# Patient Record
Sex: FEMALE | Race: WHITE | NOT HISPANIC OR LATINO | Employment: FULL TIME | URBAN - METROPOLITAN AREA
[De-identification: names, ages, dates, MRNs, and addresses within clinical notes are randomized per-mention and may not be internally consistent; named-entity substitution may affect disease eponyms.]

---

## 2017-01-10 ENCOUNTER — THERAPY VISIT (OUTPATIENT)
Dept: PHYSICAL THERAPY | Facility: CLINIC | Age: 37
End: 2017-01-10
Payer: COMMERCIAL

## 2017-01-10 DIAGNOSIS — M54.50 LUMBAGO: Primary | ICD-10-CM

## 2017-01-10 PROCEDURE — 97110 THERAPEUTIC EXERCISES: CPT | Mod: GP | Performed by: PHYSICAL THERAPIST

## 2017-01-10 PROCEDURE — 97530 THERAPEUTIC ACTIVITIES: CPT | Mod: GP | Performed by: PHYSICAL THERAPIST

## 2017-01-11 NOTE — PROGRESS NOTES
Subjective:    HPI                    Objective:    System    Physical Exam    General     ROS    Assessment/Plan:      DISCHARGE REPORT    Progress reporting period is from IE to 1/11/2017.     SUBJECTIVE  Subjective: Prolonged standing inc leg pain temporarily; tennis just LBP   Current Pain level: 0/10            ;   ,     The objective findings are from DOS 1/11/2017.    OBJECTIVE  Objective: Perf supervised pilates w focus on neutral alignement w spine during positions for skating.  Gradually increasing resilance to standing and activities that previously exacerbated leg pain.  Non- mechanical presentation, with goal going forward to remodal tissue and improve mechanics.  Pt may benefit from direct manual tech/dry needling per chiro discretion      ASSESSMENT/PLAN  Updated problem list and treatment plan: Diagnosis 1:  LBP    STG/LTGs have been met or progress has been made towards goals:  Yes (See Goal flow sheet completed today.)  Assessment of Progress: The patient has met all of their long term goals.  Self Management Plans:  Patient is independent in a home treatment program.  Patient is independent in self management of symptoms.  I have re-evaluated this patient and find that the nature, scope, duration and intensity of the therapy is appropriate for the medical condition of the patient.  Valencia continues to require the following intervention to meet STG and LTG's: PT  We will discharge this patient from PT.    Recommendations:  This patient is ready to be discharged from therapy and continue their home treatment program.  Patient would benefit from the following:  F/u w chiro            Please refer to the daily flowsheet for treatment today, total treatment time and time spent performing 1:1 timed codes.

## 2017-02-01 ENCOUNTER — THERAPY VISIT (OUTPATIENT)
Dept: CHIROPRACTIC MEDICINE | Facility: CLINIC | Age: 37
End: 2017-02-01
Payer: COMMERCIAL

## 2017-02-01 DIAGNOSIS — M99.05 SOMATIC DYSFUNCTION OF PELVIS REGION: Primary | ICD-10-CM

## 2017-02-01 DIAGNOSIS — M25.572 CHRONIC PAIN OF LEFT ANKLE: ICD-10-CM

## 2017-02-01 DIAGNOSIS — M25.552 HIP PAIN, LEFT: ICD-10-CM

## 2017-02-01 DIAGNOSIS — G89.29 CHRONIC PAIN OF LEFT ANKLE: ICD-10-CM

## 2017-02-01 DIAGNOSIS — M99.06 SOMATIC DYSFUNCTION OF LOWER EXTREMITIES: ICD-10-CM

## 2017-02-01 DIAGNOSIS — M79.10 MYALGIA: ICD-10-CM

## 2017-02-01 PROCEDURE — 97110 THERAPEUTIC EXERCISES: CPT | Performed by: CHIROPRACTOR

## 2017-02-01 PROCEDURE — 98940 CHIROPRACT MANJ 1-2 REGIONS: CPT | Mod: AT | Performed by: CHIROPRACTOR

## 2017-02-05 PROBLEM — M99.06 SOMATIC DYSFUNCTION OF LOWER EXTREMITIES: Status: ACTIVE | Noted: 2017-02-05

## 2017-02-05 PROBLEM — M99.05 SOMATIC DYSFUNCTION OF PELVIS REGION: Status: ACTIVE | Noted: 2017-02-05

## 2017-02-06 NOTE — PROGRESS NOTES
[facility]  Apr 8, 2016    Subjective:  Valencia Roy   35 year old   female  Denies any changes in medications   CC: left medial ankle, chronic posterior tibialis dysfunction, referral from Dr. Valdovinos  Visit: 6 (even though has not been treated in a while, this is similar symptoms and I have been in communication with her this whole time helping her progress in her running)  Goal: Be able to run beyond 2 miles, stand for 60 minutes without ache in foot  Location:  Left medial ankle   Comes in today doing great. She is back running and it is going well. She notes she made such great gains that she wants to focus on a couple more treatments to help her continue to increase in her speed and time of running. She notes she has been working with PT for lower back and notes her PT recommended that she gets treatment for tightness in lower back as well. Since working with PT we will focus on pelvic leveling and manual therapy. Notes back is 4/10 on average and ankle is 3/10. Denies any swelling. Notes some days she has no pain and feels great.       Objective:  Inspection:  No SDD  No Scars  Normal Gait    Palpation:  No specific pain  Palpable soreness over L medial ankle, left posterior hip, general lower back  Myofascitis 2/4 noted over left posterior tibialis, L hip ER, L QL, LPS    ROM:  Lumbar flexion   90/90, tightness lower back  Lumbar extension  30/30, mild lower back discomfort   Right Hip IR  45/45  Left Hip IR  34/45  Right Hip ER  53/60  Left  hip ER  58/60  Ankle DF limited on L in talar neutral     MMT:  Left glute med 4/5 with no pain  Right glute med 5/5 with no pain  Left TFL 5/5 with lateral hip discomfort   Right TFL 5/5 with no pain    MET:  Left short leg    Squat:  Shift to left  Foot flare to right  Arm drop on left  B foot collapse     Lunge:  Left knee genu valgum  Leftt knee cross over     NAL:  Restricted SI on left side  Restricted talus L      Assessment:  NAL with  associated myofascitis and weakness  Posterior tibialis dysfunction     Plan:  Patient tolerated treatment well today  Treatment Time: 45 minutes   79603 manipulation 1-2 segments: MET, Hip LAD  63710 manipulation: Manual extremity  68275 Manual therapy: (ART, Graston, Strain Counter Strain, Fascial Manipulation, Cupping) performed over area of L hip ER, L posterior tibialis, L calf  57273 therapeutic exercise (20 minutes):  Self ankle mulligan, short foot lateral band walks, toe yoga, single leg balance on bosu ball, single leg hops on bosu ball, single leg hops and land off bosu ball, walking lunges with weight, single leg ball toss on trampoline, short foot activation exercises, alter G running: to be scheduled in [place]  Today: review of current PT exercises she is doing for back and ankle, made corrections as needed  Strapping: ankle mulligan, hip IR  Taping:  Next visit: 3 week, today focused on fascial manipulation of medial lower leg  Other: shock wave 10/6 over medial ankle (did not do today)  Dry Needling: tibial nerve e-stim, 5 points along posterior tib (tolerated well)  Plan: 4 visits over next 2 months to help her progress with her training      Atif Holliday DC, MEd, ATC

## 2017-02-14 ENCOUNTER — THERAPY VISIT (OUTPATIENT)
Dept: CHIROPRACTIC MEDICINE | Facility: CLINIC | Age: 37
End: 2017-02-14
Payer: COMMERCIAL

## 2017-02-14 DIAGNOSIS — M99.06 SOMATIC DYSFUNCTION OF LOWER EXTREMITIES: ICD-10-CM

## 2017-02-14 DIAGNOSIS — M99.05 SOMATIC DYSFUNCTION OF PELVIS REGION: Primary | ICD-10-CM

## 2017-02-14 DIAGNOSIS — G89.29 CHRONIC PAIN OF LEFT ANKLE: ICD-10-CM

## 2017-02-14 DIAGNOSIS — M79.10 MYALGIA: ICD-10-CM

## 2017-02-14 DIAGNOSIS — M25.552 HIP PAIN, LEFT: ICD-10-CM

## 2017-02-14 DIAGNOSIS — M25.572 CHRONIC PAIN OF LEFT ANKLE: ICD-10-CM

## 2017-02-14 PROCEDURE — 98940 CHIROPRACT MANJ 1-2 REGIONS: CPT | Mod: AT | Performed by: CHIROPRACTOR

## 2017-02-14 PROCEDURE — 97110 THERAPEUTIC EXERCISES: CPT | Performed by: CHIROPRACTOR

## 2017-02-17 NOTE — PROGRESS NOTES
[facility] [facility]  Apr 8, 2016    Subjective:  Valencia Roy   35 year old   female  Denies any changes in medications   CC: left medial ankle, chronic posterior tibialis dysfunction, general lower back  Visit: 7  Goal: Be able to run beyond 2 miles, stand for 60 minutes without ache in foot  Location:  Left medial ankle, lower back pain  Comes in today doing well. She tolerated last sessio well. Was a little sore for one day, but notes that she responded well and thinks it helps. Notes tightness along left lower back. She notes she is still working on active care and denies any new issues. Was pleased with progress. She already ran today.       Objective:  Inspection:  No SDD  No Scars  Normal Gait    Palpation:  No specific pain  Palpable soreness over L medial ankle, left posterior hip, general lower back  Myofascitis 2/4 noted over left posterior tibialis, L hip ER, L QL, LPS    ROM:  Lumbar flexion   90/90, tightness lower back  Lumbar extension  30/30, mild lower back discomfort   Right Hip IR  45/45  Left Hip IR  34/45  Right Hip ER  53/60  Left  hip ER  58/60  Ankle DF limited on L in talar neutral     MMT:  Left glute med 4/5 with no pain  Right glute med 5/5 with no pain  Left TFL 5/5 with lateral hip discomfort   Right TFL 5/5 with no pain    MET:  Left short leg    Squat:  Shift to left  Foot flare to right  Arm drop on left  B foot collapse     Lunge:  Left knee genu valgum  Leftt knee cross over     NAL:  Restricted SI on left side  Restricted talus L      Assessment:  NAL with associated myofascitis and weakness  Posterior tibialis dysfunction     Plan:  Patient tolerated treatment well today  Treatment Time: 45 minutes   86578 manipulation 1-2 segments: MET, Hip LAD  31188 manipulation: Manual extremity  85496 Manual therapy: (ART, Graston, Strain Counter Strain, Fascial Manipulation, Cupping) performed over area of L hip ER, L posterior tibialis, L calf  66092 therapeutic exercise  (20 minutes):  Self ankle mulligan, short foot lateral band walks, toe yoga, single leg balance on bosu ball, single leg hops on bosu ball, single leg hops and land off bosu ball, walking lunges with weight, single leg ball toss on trampoline, short foot activation exercises, alter G running: to be scheduled in [place]  Today: review of current PT exercises she is doing for back and ankle, made corrections as needed  Strapping: ankle mulligan, hip IR  Taping:  Next visit: 3 week, today focused on fascial manipulation of medial lower leg  Dry Needling: tibial nerve e-stim, 5 points along posterior tib (tolerated well)  Plan: 4 visits over next 2 months to help her progress with her training      Atif Holliday DC, MEd, ATC

## 2017-02-28 ENCOUNTER — THERAPY VISIT (OUTPATIENT)
Dept: CHIROPRACTIC MEDICINE | Facility: CLINIC | Age: 37
End: 2017-02-28
Payer: COMMERCIAL

## 2017-02-28 DIAGNOSIS — M79.10 MYALGIA: ICD-10-CM

## 2017-02-28 DIAGNOSIS — G89.29 CHRONIC PAIN OF LEFT ANKLE: ICD-10-CM

## 2017-02-28 DIAGNOSIS — M99.06 SOMATIC DYSFUNCTION OF LOWER EXTREMITIES: ICD-10-CM

## 2017-02-28 DIAGNOSIS — M25.572 CHRONIC PAIN OF LEFT ANKLE: ICD-10-CM

## 2017-02-28 DIAGNOSIS — M25.552 HIP PAIN, LEFT: ICD-10-CM

## 2017-02-28 DIAGNOSIS — M99.05 SOMATIC DYSFUNCTION OF PELVIS REGION: ICD-10-CM

## 2017-02-28 PROCEDURE — 97110 THERAPEUTIC EXERCISES: CPT | Performed by: CHIROPRACTOR

## 2017-02-28 PROCEDURE — 98940 CHIROPRACT MANJ 1-2 REGIONS: CPT | Mod: AT | Performed by: CHIROPRACTOR

## 2017-02-28 NOTE — MR AVS SNAPSHOT
"              After Visit Summary   2/28/2017    Valencia Roy    MRN: [id number]           Patient Information     Date Of Birth          1980        Visit Information        Provider Department      2/28/2017 6:45 PM Atif oHlliday DC [facility] Chiropractor        Today's Diagnoses     Somatic dysfunction of pelvis region        Somatic dysfunction of lower extremities        Chronic pain of left ankle        Hip pain, left        Myalgia           Follow-ups after your visit        Who to contact     If you have questions or need follow up information about today's clinic visit or your schedule please contact [facility] ATHLETIC [facility] CHIROPRACTOR directly at [phone].  Normal or non-critical lab and imaging results will be communicated to you by flaregameshart, letter or phone within 4 business days after the clinic has received the results. If you do not hear from us within 7 days, please contact the clinic through flaregameshart or phone. If you have a critical or abnormal lab result, we will notify you by phone as soon as possible.  Submit refill requests through Morningstar or call your pharmacy and they will forward the refill request to us. Please allow 3 business days for your refill to be completed.          Additional Information About Your Visit        MyChart Information     Morningstar lets you send messages to your doctor, view your test results, renew your prescriptions, schedule appointments and more. To sign up, go to [email or web address] . Click on \"Log in\" on the left side of the screen, which will take you to the Welcome page. Then click on \"Sign up Now\" on the right side of the page.     You will be asked to enter the access code listed below, as well as some personal information. Please follow the directions to create your username and password.     Your access code is: [email or web address]  Expires: 5/18/2017  1:52 PM     Your access code will "  in 90 days. If you need help or a new code, please call your [facility] clinic or [phone].        Care EveryWhere ID     This is your Care EveryWhere ID. This could be used by other organizations to access your [facility] medical records  [id number]         Blood Pressure from Last 3 Encounters:   12/15/15 129/72   09/22/15 124/74    Weight from Last 3 Encounters:   12/15/15 62.6 kg (138 lb)   09/22/15 63 kg (139 lb)              We Performed the Following     CHIROPRAC MANIP,SPINAL,1-2 REGIONS     THERAPEUTIC EXERCISES        Primary Care Provider    Pcp Unknown Verified       No address on file        Thank you!     Thank you for choosing [place] [facility]  [facility]  for your care. Our goal is always to provide you with excellent care. Hearing back from our patients is one way we can continue to improve our services. Please take a few minutes to complete the written survey that you may receive in the mail after your visit with us. Thank you!             Your Updated Medication List - Protect others around you: Learn how to safely use, store and throw away your medicines at www.disposemymeds.org.          This list is accurate as of: 17  9:50 PM.  Always use your most recent med list.                   Brand Name Dispense Instructions for use    CALCIUM/MAGNESIUM/ZINC PO      Vitamin D Metabolic Maintenance       DAILY HERBS RELAX/EASE TENSION PO          DIGESTIVE ENZYME PO          EPA PLUS PO          MELATONIN PO          NUTRITIONAL SUPPLEMENT PO      Liver GI Detox       UNABLE TO FIND      EstroDim

## 2017-03-01 NOTE — PROGRESS NOTES
[facility] [facility]  Apr 8, 2016  I ran 6.5 today and felt my foot a little 4-5 and then a little more 5-6 but overall not bad.    The AlterG on Wed - went 80 minutes without pain, but then it started hurting and I took off more weight...didn't hurt much but didn't totally go away either. When I got off that area felt warm compared to the other side. But the previous two times on the AlterG, my foot started to hurt a little at about 60 and then eventually if I took enough weight off, the pain went away. So seems a little better.    Hope you're having a good weekend and see you Tuesday!  Subjective:  Valencia Roy   35 year old   female  Denies any changes in medications   CC: left medial ankle, chronic posterior tibialis dysfunction, general lower back L>R  Visit: 9  Goal: Be able to run beyond 2 miles, stand for 60 minutes without ache in foot  Location:  Left medial ankle, lower back pain L>R  Comes in today doing well. She is continuing to progress in her running and she is very pleased with that. She notes continued progress but still has some bad days. Notes that she is still running on alter G (see email above). She notes she is working on lower back active care but says has had increase in left lower back tightness. She denies any new issue.       Objective:  Inspection:  No SDD  No Scars  Normal Gait    Palpation:  No specific pain  Palpable soreness over L medial ankle, left posterior hip, general lower back, L QL  Myofascitis 2/4 noted over left posterior tibialis, L hip ER, L QL, LPS    ROM:  Lumbar flexion   90/90, tightness lower back  Lumbar extension  30/30, mild lower back discomfort   Right Hip IR  45/45  Left Hip IR  34/45  Right Hip ER  53/60  Left  hip ER  58/60  Ankle DF limited on L in talar neutral     MMT:  Left glute med 4/5 with no pain  Right glute med 5/5 with no pain  Left TFL 5/5 with lateral hip discomfort   Right TFL 5/5 with no pain    MET:  Left short  leg    Squat:  Shift to left  Foot flare to right  Arm drop on left  B foot collapse     Lunge:  Left knee genu valgum  Leftt knee cross over     NAL:  Restricted SI on left side  Restricted talus L      Assessment:  NAL with associated myofascitis and weakness  Posterior tibialis dysfunction     Plan:  Patient tolerated treatment well today  Treatment Time: 45 minutes   52694 manipulation 1-2 segments: MET, Hip LAD  97253 manipulation: Manual extremity  36400 Manual therapy: (ART, Graston, Strain Counter Strain, Fascial Manipulation, Cupping) performed over area of L hip ER, L posterior tibialis, L calf  91323 therapeutic exercise (20 minutes):  Self ankle mulligan, short foot lateral band walks, toe yoga, single leg balance on bosu ball, single leg hops on bosu ball, single leg hops and land off bosu ball, walking lunges with weight, single leg ball toss on trampoline, short foot activation exercises, alter G running: to be scheduled in [place], review of current PT exercises she is doing for back and ankle, made corrections as needed  Today: standing hip hikes, bird dogs on stability ball, Mgill sit ups  Strapping: ankle mulligan, hip IR  Taping:  Next visit: 3 week, today focused on fascial manipulation of medial lower leg  Dry Needling: tibial nerve e-stim, 5 points along posterior tib (tolerated well)  Shock wave: 10/5, medial ankle, tolerated well.       Atif Holliday DC, MEd, ATC

## 2017-03-14 ENCOUNTER — THERAPY VISIT (OUTPATIENT)
Dept: CHIROPRACTIC MEDICINE | Facility: CLINIC | Age: 37
End: 2017-03-14
Payer: COMMERCIAL

## 2017-03-14 DIAGNOSIS — G89.29 CHRONIC PAIN OF LEFT ANKLE: ICD-10-CM

## 2017-03-14 DIAGNOSIS — M99.06 SOMATIC DYSFUNCTION OF LOWER EXTREMITIES: ICD-10-CM

## 2017-03-14 DIAGNOSIS — M99.05 SOMATIC DYSFUNCTION OF PELVIS REGION: ICD-10-CM

## 2017-03-14 DIAGNOSIS — M25.552 HIP PAIN, LEFT: ICD-10-CM

## 2017-03-14 DIAGNOSIS — M79.10 MYALGIA: ICD-10-CM

## 2017-03-14 DIAGNOSIS — M25.572 CHRONIC PAIN OF LEFT ANKLE: ICD-10-CM

## 2017-03-14 PROCEDURE — 98940 CHIROPRACT MANJ 1-2 REGIONS: CPT | Mod: AT | Performed by: CHIROPRACTOR

## 2017-03-14 PROCEDURE — 97110 THERAPEUTIC EXERCISES: CPT | Performed by: CHIROPRACTOR

## 2017-03-14 NOTE — MR AVS SNAPSHOT
"              After Visit Summary   3/14/2017    Valencia Roy    MRN: [id number]           Patient Information     Date Of Birth          1980        Visit Information        Provider Department      3/14/2017 5:45 PM Atif Holliday DC [facility] Chiropractor        Today's Diagnoses     Somatic dysfunction of pelvis region        Somatic dysfunction of lower extremities        Chronic pain of left ankle        Hip pain, left        Myalgia           Follow-ups after your visit        Who to contact     If you have questions or need follow up information about today's clinic visit or your schedule please contact [facility] ATHLETIC [facility] CHIROPRACTOR directly at [phone].  Normal or non-critical lab and imaging results will be communicated to you by Wickrhart, letter or phone within 4 business days after the clinic has received the results. If you do not hear from us within 7 days, please contact the clinic through Wickrhart or phone. If you have a critical or abnormal lab result, we will notify you by phone as soon as possible.  Submit refill requests through MemBlaze or call your pharmacy and they will forward the refill request to us. Please allow 3 business days for your refill to be completed.          Additional Information About Your Visit        MyChart Information     MemBlaze lets you send messages to your doctor, view your test results, renew your prescriptions, schedule appointments and more. To sign up, go to [email or web address] . Click on \"Log in\" on the left side of the screen, which will take you to the Welcome page. Then click on \"Sign up Now\" on the right side of the page.     You will be asked to enter the access code listed below, as well as some personal information. Please follow the directions to create your username and password.     Your access code is: [email or web address]  Expires: 5/18/2017  2:52 PM     Your access code will "  in 90 days. If you need help or a new code, please call your [facility] clinic or [phone].        Care EveryWhere ID     This is your Care EveryWhere ID. This could be used by other organizations to access your [facility] medical records  [id number]         Blood Pressure from Last 3 Encounters:   12/15/15 129/72   09/22/15 124/74    Weight from Last 3 Encounters:   12/15/15 62.6 kg (138 lb)   09/22/15 63 kg (139 lb)              We Performed the Following     CHIROPRAC MANIP,SPINAL,1-2 REGIONS     THERAPEUTIC EXERCISES        Primary Care Provider    Pcp Unknown Verified       No address on file        Thank you!     Thank you for choosing [facility]  [facility]  for your care. Our goal is always to provide you with excellent care. Hearing back from our patients is one way we can continue to improve our services. Please take a few minutes to complete the written survey that you may receive in the mail after your visit with us. Thank you!             Your Updated Medication List - Protect others around you: Learn how to safely use, store and throw away your medicines at www.disposemymeds.org.          This list is accurate as of: 3/14/17  7:18 PM.  Always use your most recent med list.                   Brand Name Dispense Instructions for use    CALCIUM/MAGNESIUM/ZINC PO      Vitamin D Metabolic Maintenance       DAILY HERBS RELAX/EASE TENSION PO          DIGESTIVE ENZYME PO          EPA PLUS PO          MELATONIN PO          NUTRITIONAL SUPPLEMENT PO      Liver GI Detox       UNABLE TO FIND      EstroDim

## 2017-03-15 NOTE — PROGRESS NOTES
[place] [facility]  Apr 8, 2016    Subjective:  Valencia Roy   35 year old   female  Denies any changes in medications   CC: left medial ankle, chronic posterior tibialis dysfunction, general lower back L>R  Visit: 10  Goal: Be able to run beyond 2 miles, stand for 60 minutes without ache in foot  Location:  Left medial ankle, lower back pain L>R  Comes in today doing well. She is continuing to progress in her running and she is very pleased with that. She was able to do 110 minutes on Alter G pain free at 80% and was very pleased. Still running outside and progressing. Notes ankle felt very good after last session. Notes back was very tight after Alter G. Saw her PT ([facility]) and they did some stretching and it helped. Notes left lower back pain. Denies any new issues and very pleased with how she has felt since last visit.        Objective:  Inspection:  No SDD  No Scars  Normal Gait    Palpation:  No specific pain  Palpable soreness over L medial ankle, left posterior hip, general lower back, L QL  Myofascitis 2/4 noted over left posterior tibialis, L hip ER, L QL, LPS    ROM:  Lumbar flexion   90/90, tightness lower back  Lumbar extension  30/30, mild lower back discomfort   Right Hip IR  45/45  Left Hip IR  34/45  Right Hip ER  53/60  Left  hip ER  58/60  Ankle DF limited on L in talar neutral     MMT:  Left glute med 4/5 with no pain  Right glute med 5/5 with no pain  Left TFL 5/5 with lateral hip discomfort   Right TFL 5/5 with no pain    MET:  Left short leg    Squat:  Shift to left  Foot flare to right  Arm drop on left  B foot collapse     Lunge:  Left knee genu valgum  Leftt knee cross over     NAL:  Restricted SI on left side  Restricted talus L      Assessment:  NAL with associated myofascitis and weakness  Posterior tibialis dysfunction     Plan:  Patient tolerated treatment well today  Treatment Time: 45 minutes   90557 manipulation 1-2 segments: MET, Hip LAD  00315 manipulation:  Manual extremity  70083 Manual therapy: (ART, Graston, Strain Counter Strain, Fascial Manipulation, Cupping) performed over area of L hip ER, L posterior tibialis, L calf  17658 therapeutic exercise (20 minutes):  Self ankle mulligan, short foot lateral band walks, toe yoga, single leg balance on bosu ball, single leg hops on bosu ball, single leg hops and land off bosu ball, walking lunges with weight, single leg ball toss on trampoline, short foot activation exercises, alter G running: to be scheduled in [place], review of current PT exercises she is doing for back and ankle, made corrections as needed  Today: standing hip hikes, bird dogs on stability ball, Mgill sit ups  Strapping: ankle mulligan, hip IR  Taping:  Next visit: 3 week, today focused on fascial manipulation of medial lower leg, left posterior hip, L QL  Dry Needlin points along posterior tib (tolerated well)  Shock wave: 15/5, medial ankle, tolerated well.       Atif Holliday DC, MEd, ATC

## 2017-03-31 ENCOUNTER — THERAPY VISIT (OUTPATIENT)
Dept: CHIROPRACTIC MEDICINE | Facility: CLINIC | Age: 37
End: 2017-03-31
Payer: COMMERCIAL

## 2017-03-31 DIAGNOSIS — G89.29 CHRONIC PAIN OF LEFT ANKLE: ICD-10-CM

## 2017-03-31 DIAGNOSIS — M25.572 CHRONIC PAIN OF LEFT ANKLE: ICD-10-CM

## 2017-03-31 DIAGNOSIS — M99.06 SOMATIC DYSFUNCTION OF LOWER EXTREMITIES: ICD-10-CM

## 2017-03-31 DIAGNOSIS — M99.05 SOMATIC DYSFUNCTION OF PELVIS REGION: Primary | ICD-10-CM

## 2017-03-31 DIAGNOSIS — M25.552 HIP PAIN, LEFT: ICD-10-CM

## 2017-03-31 DIAGNOSIS — M79.10 MYALGIA: ICD-10-CM

## 2017-03-31 PROCEDURE — 97110 THERAPEUTIC EXERCISES: CPT | Performed by: CHIROPRACTOR

## 2017-03-31 PROCEDURE — 98940 CHIROPRACT MANJ 1-2 REGIONS: CPT | Mod: AT | Performed by: CHIROPRACTOR

## 2017-03-31 NOTE — MR AVS SNAPSHOT
"              After Visit Summary   3/31/2017    Valencia Roy    MRN: [id number]           Patient Information     Date Of Birth          1980        Visit Information        Provider Department      3/31/2017 5:45 PM Atif Holliday DC [facility] Chiropractor        Today's Diagnoses     Somatic dysfunction of pelvis region    -  1    Somatic dysfunction of lower extremities        Chronic pain of left ankle        Hip pain, left        Myalgia           Follow-ups after your visit        Who to contact     If you have questions or need follow up information about today's clinic visit or your schedule please contact [facility] ATHLETIC [facility] CHIROPRACTOR directly at [phone].  Normal or non-critical lab and imaging results will be communicated to you by Thoundshart, letter or phone within 4 business days after the clinic has received the results. If you do not hear from us within 7 days, please contact the clinic through Thoundshart or phone. If you have a critical or abnormal lab result, we will notify you by phone as soon as possible.  Submit refill requests through Made[employer] or call your pharmacy and they will forward the refill request to us. Please allow 3 business days for your refill to be completed.          Additional Information About Your Visit        MyChart Information     [employer] lets you send messages to your doctor, view your test results, renew your prescriptions, schedule appointments and more. To sign up, go to [email or web address] . Click on \"Log in\" on the left side of the screen, which will take you to the Welcome page. Then click on \"Sign up Now\" on the right side of the page.     You will be asked to enter the access code listed below, as well as some personal information. Please follow the directions to create your username and password.     Your access code is: [email or web address]  Expires: 5/18/2017  2:52 PM     Your access code " will  in 90 days. If you need help or a new code, please call your [place] clinic or [phone].        Care EveryWhere ID     This is your Care EveryWhere ID. This could be used by other organizations to access your [place] medical records  [id number]         Blood Pressure from Last 3 Encounters:   12/15/15 129/72   09/22/15 124/74    Weight from Last 3 Encounters:   12/15/15 62.6 kg (138 lb)   09/22/15 63 kg (139 lb)              We Performed the Following     CHIROPRAC MANIP,SPINAL,1-2 REGIONS     THERAPEUTIC EXERCISES        Primary Care Provider    Pcp Unknown Verified       No address on file        Thank you!     Thank you for choosing [place] [facility]  [facility]  [facility] your care. Our goal is always to provide you with excellent care. Hearing back from our patients is one way we can continue to improve our services. Please take a few minutes to complete the written survey that you may receive in the mail after your visit with us. Thank you!             Your Updated Medication List - Protect others around you: Learn how to safely use, store and throw away your medicines at www.disposemymeds.org.          This list is accurate as of: 3/31/17  8:26 PM.  Always use your most recent med list.                   Brand Name Dispense Instructions for use    CALCIUM/MAGNESIUM/ZINC PO      Vitamin D Metabolic Maintenance       DAILY HERBS RELAX/EASE TENSION PO          DIGESTIVE ENZYME PO          EPA PLUS PO          MELATONIN PO          NUTRITIONAL SUPPLEMENT PO      Liver GI Detox       UNABLE TO FIND      EstroDim

## 2017-04-01 NOTE — PROGRESS NOTES
[facility] [facility]  Apr 8, 2016    Subjective:  Valencia Roy   35 year old   female  Denies any changes in medications   CC: left medial ankle, chronic posterior tibialis dysfunction, general lower back L>R  Visit: 11  Goal: Be able to run beyond 2 miles, stand for 60 minutes without ache in foot  Location:  Left medial ankle, lower back pain L>R  Comes in today doing great. Progressing well with long run on [employer] and running outside. Notes both ankle and back is better. She is seeing DO for her neck and upper back. Notes it is going well. States that pain in ankle is 3/10 during running and not specific of when it comes on. Notes lower back is doing well and notes most tightness over lateral hip and lower back instead of mid lower back.       Objective:  Inspection:  No SDD  No Scars  Normal Gait    Palpation:  No specific pain  Palpable soreness over L medial ankle, left posterior hip, general lower back, L QL  Myofascitis 2/4 noted over left posterior tibialis, L hip ER, L QL, LPS    ROM:  Lumbar flexion   90/90, tightness lower back  Lumbar extension  30/30, mild lower back discomfort   Right Hip IR  45/45  Left Hip IR  34/45  Right Hip ER  53/60  Left  hip ER  58/60  Ankle DF limited on L in talar neutral     MMT:  Left glute med 4/5 with no pain  Right glute med 5/5 with no pain  Left TFL 5/5 with lateral hip discomfort   Right TFL 5/5 with no pain    MET:  Left short leg    Squat:  Shift to left  Foot flare to right  Arm drop on left  B foot collapse     Lunge:  Left knee genu valgum  Leftt knee cross over     NAL:  Restricted SI on left side  Restricted talus L      Assessment:  NAL with associated myofascitis and weakness  Posterior tibialis dysfunction     Plan:  Patient tolerated treatment well today  Treatment Time: 45 minutes   41298 manipulation 1-2 segments: MET, Hip LAD  42234 manipulation: Manual extremity  67925 Manual therapy: (ART, Graston, Strain Counter Strain, Fascial  Manipulation, Cupping) performed over area of L hip ER, L posterior tibialis, L calf  07865 therapeutic exercise (20 minutes):  Self ankle mulligan, short foot lateral band walks, toe yoga, single leg balance on bosu ball, single leg hops on bosu ball, single leg hops and land off bosu ball, walking lunges with weight, single leg ball toss on trampoline, short foot activation exercises, alter G running: to be scheduled in [place], review of current PT exercises she is doing for back and ankle, made corrections as needed  Today: standing hip hikes, bird dogs on stability ball, Mgill sit ups  Strapping: ankle mulligan, hip IR  Taping:  Next visit: 3 week, today focused on fascial manipulation of medial lower leg, left posterior hip, L QL  Dry Needlin points along posterior tib (tolerated well)  Shock wave: 15/6, medial ankle, tolerated well.       Atif Holliday DC, MEd, ATC

## 2017-04-20 ENCOUNTER — THERAPY VISIT (OUTPATIENT)
Dept: CHIROPRACTIC MEDICINE | Facility: CLINIC | Age: 37
End: 2017-04-20
Payer: COMMERCIAL

## 2017-04-20 DIAGNOSIS — G89.29 CHRONIC PAIN OF LEFT ANKLE: ICD-10-CM

## 2017-04-20 DIAGNOSIS — M99.06 SOMATIC DYSFUNCTION OF LOWER EXTREMITIES: ICD-10-CM

## 2017-04-20 DIAGNOSIS — M79.10 MYALGIA: ICD-10-CM

## 2017-04-20 DIAGNOSIS — M25.552 HIP PAIN, LEFT: ICD-10-CM

## 2017-04-20 DIAGNOSIS — M25.572 CHRONIC PAIN OF LEFT ANKLE: ICD-10-CM

## 2017-04-20 DIAGNOSIS — M99.05 SOMATIC DYSFUNCTION OF PELVIS REGION: ICD-10-CM

## 2017-04-20 PROCEDURE — 98940 CHIROPRACT MANJ 1-2 REGIONS: CPT | Mod: AT | Performed by: CHIROPRACTOR

## 2017-04-20 PROCEDURE — 97110 THERAPEUTIC EXERCISES: CPT | Performed by: CHIROPRACTOR

## 2017-04-20 NOTE — MR AVS SNAPSHOT
"              After Visit Summary   4/20/2017    Valencia Roy    MRN: [id number]           Patient Information     Date Of Birth          1980        Visit Information        Provider Department      4/20/2017 4:15 PM Atif Holliday DC [facility]        Today's Diagnoses     Somatic dysfunction of pelvis region        Somatic dysfunction of lower extremities        Chronic pain of left ankle        Hip pain, left        Myalgia           Follow-ups after your visit        Who to contact     If you have questions or need follow up information about today's clinic visit or your schedule please contact [facility] ATHLETIC [facility] CHIROPRACTOR directly at [phone].  Normal or non-critical lab and imaging results will be communicated to you by FP Completehart, letter or phone within 4 business days after the clinic has received the results. If you do not hear from us within 7 days, please contact the clinic through FP Completehart or phone. If you have a critical or abnormal lab result, we will notify you by phone as soon as possible.  Submit refill requests through [employer] or call your pharmacy and they will forward the refill request to us. Please allow 3 business days for your refill to be completed.          Additional Information About Your Visit        MyChart Information     [employer] lets you send messages to your doctor, view your test results, renew your prescriptions, schedule appointments and more. To sign up, go to [email or web address] . Click on \"Log in\" on the left side of the screen, which will take you to the Welcome page. Then click on \"Sign up Now\" on the right side of the page.     You will be asked to enter the access code listed below, as well as some personal information. Please follow the directions to create your username and password.     Your access code is: [email or web address]  Expires: 5/18/2017  2:52 PM     Your access code will "  in 90 days. If you need help or a new code, please call your [place] clinic or [phone].        Care EveryWhere ID     This is your Care EveryWhere ID. This could be used by other organizations to access your [place] medical records  [id number]         Blood Pressure from Last 3 Encounters:   12/15/15 129/72   09/22/15 124/74    Weight from Last 3 Encounters:   12/15/15 62.6 kg (138 lb)   09/22/15 63 kg (139 lb)              We Performed the Following     CHIROPRAC MANIP,SPINAL,1-2 REGIONS     THERAPEUTIC EXERCISES        Primary Care Provider    Pcp Unknown Verified       No address on file        Thank you!     Thank you for choosing [facility]  [facility]  for your care. Our goal is always to provide you with excellent care. Hearing back from our patients is one way we can continue to improve our services. Please take a few minutes to complete the written survey that you may receive in the mail after your visit with us. Thank you!             Your Updated Medication List - Protect others around you: Learn how to safely use, store and throw away your medicines at www.disposemymeds.org.          This list is accurate as of: 17  8:02 PM.  Always use your most recent med list.                   Brand Name Dispense Instructions for use    CALCIUM/MAGNESIUM/ZINC PO      Vitamin D Metabolic Maintenance       DAILY HERBS RELAX/EASE TENSION PO          DIGESTIVE ENZYME PO          EPA PLUS PO          MELATONIN PO          NUTRITIONAL SUPPLEMENT PO      Liver GI Detox       UNABLE TO FIND      EstroDim

## 2017-04-21 NOTE — PROGRESS NOTES
[place] [facility]  Apr 8, 2016    Subjective:  Valencia Roy   35 year old   female  Denies any changes in medications   CC: left medial ankle, chronic posterior tibialis dysfunction, general lower back L>R  Visit: 12  Goal: Be able to run beyond 2 miles, stand for 60 minutes without ache in foot  Location:  Left medial ankle, lower back pain L>R  Comes in today doing well. She continues to note improvement in ankle. Notes that occasionally has cramping sensation in arch of left foot. Notes that doesn't happen all time. Notes she rolls it and it usually feels good. Notes she started running with [employer] which is helping and she is working on speed. She denies any new issues with ankle. Back is still good but still tight. Notes feels very good after treatment.       Objective:  Inspection:  No SDD  No Scars  Normal Gait    Palpation:  No specific pain  Palpable soreness over L medial ankle, left posterior hip, general lower back, L QL  Myofascitis 2/4 noted over left posterior tibialis, L hip ER, L QL, LPS    ROM:  Lumbar flexion   90/90, tightness lower back  Lumbar extension  30/30, mild lower back discomfort   Right Hip IR  45/45  Left Hip IR  34/45  Right Hip ER  53/60  Left  hip ER  58/60  Ankle DF limited on L in talar neutral     MMT:  Left glute med 4/5 with no pain  Right glute med 5/5 with no pain  Left TFL 5/5 with lateral hip discomfort   Right TFL 5/5 with no pain    MET:  Left short leg    Squat:  Shift to left  Foot flare to right  Arm drop on left  B foot collapse     Lunge:  Left knee genu valgum  Leftt knee cross over     NAL:  Restricted SI on left side  Restricted talus L      Assessment:  NAL with associated myofascitis and weakness  Posterior tibialis dysfunction     Plan:  Patient tolerated treatment well today  Treatment Time: 45 minutes   26466 manipulation 1-2 segments: MET, Hip LAD  42480 manipulation: Manual extremity  29880 Manual therapy: (ART, Graston, Strain Counter  Strain, Fascial Manipulation, Cupping) performed over area of L hip ER, L posterior tibialis, L calf  71129 therapeutic exercise (20 minutes):  Self ankle mulligan, short foot lateral band walks, toe yoga, single leg balance on bosu ball, single leg hops on bosu ball, single leg hops and land off bosu ball, walking lunges with weight, single leg ball toss on trampoline, short foot activation exercises, alter G running: to be scheduled in [place], review of current PT exercises she is doing for back and ankle, made corrections as needed  Today: standing hip hikes, bird dogs on stability ball, Mgill sit ups  Strapping: ankle mulligan, hip IR  Taping:  Next visit: 4 week, today focused on fascial manipulation of medial lower leg, left posterior hip, L QL  Dry Needlin points along posterior tib (did not do today)  Shock wave: 15/6, medial ankle, tolerated well.       Atif Holliday DC, MEd, ATC

## 2017-05-19 ENCOUNTER — THERAPY VISIT (OUTPATIENT)
Dept: CHIROPRACTIC MEDICINE | Facility: CLINIC | Age: 37
End: 2017-05-19
Payer: COMMERCIAL

## 2017-05-19 DIAGNOSIS — M25.552 HIP PAIN, LEFT: ICD-10-CM

## 2017-05-19 DIAGNOSIS — M99.06 SOMATIC DYSFUNCTION OF LOWER EXTREMITIES: ICD-10-CM

## 2017-05-19 DIAGNOSIS — M99.05 SOMATIC DYSFUNCTION OF PELVIS REGION: Primary | ICD-10-CM

## 2017-05-19 DIAGNOSIS — M79.10 MYALGIA: ICD-10-CM

## 2017-05-19 DIAGNOSIS — G89.29 CHRONIC PAIN OF LEFT ANKLE: ICD-10-CM

## 2017-05-19 DIAGNOSIS — M25.572 CHRONIC PAIN OF LEFT ANKLE: ICD-10-CM

## 2017-05-19 PROCEDURE — 97110 THERAPEUTIC EXERCISES: CPT | Performed by: CHIROPRACTOR

## 2017-05-19 PROCEDURE — 98940 CHIROPRACT MANJ 1-2 REGIONS: CPT | Mod: AT | Performed by: CHIROPRACTOR

## 2017-05-19 NOTE — MR AVS SNAPSHOT
"              After Visit Summary   5/19/2017    Valencia Roy    MRN: [id number]           Patient Information     Date Of Birth          1980        Visit Information        Provider Department      5/19/2017 4:45 PM Atif Holliday DC [facility] Chiropractor        Today's Diagnoses     Somatic dysfunction of pelvis region    -  1    Somatic dysfunction of lower extremities        Chronic pain of left ankle        Hip pain, left        Myalgia           Follow-ups after your visit        Who to contact     If you have questions or need follow up information about today's clinic visit or your schedule please contact [facility] ATHLETIC [facility] CHIROPRACTOR directly at [phone].  Normal or non-critical lab and imaging results will be communicated to you by Cyber-Rainhart, letter or phone within 4 business days after the clinic has received the results. If you do not hear from us within 7 days, please contact the clinic through Cyber-Rainhart or phone. If you have a critical or abnormal lab result, we will notify you by phone as soon as possible.  Submit refill requests through EventMama or call your pharmacy and they will forward the refill request to us. Please allow 3 business days for your refill to be completed.          Additional Information About Your Visit        MyChart Information     EventM[employer] lets you send messages to your doctor, view your test results, renew your prescriptions, schedule appointments and more. To sign up, go to [email or web address] . Click on \"Log in\" on the left side of the screen, which will take you to the Welcome page. Then click on \"Sign up Now\" on the right side of the page.     You will be asked to enter the access code listed below, as well as some personal information. Please follow the directions to create your username and password.     Your access code is: [email or web address]  Expires: 8/18/2017  9:00 PM     Your access code " will  in 90 days. If you need help or a new code, please call your [facility] clinic or [phone].        Care EveryWhere ID     This is your Care EveryWhere ID. This could be used by other organizations to access your [facility] medical records  [id number]         Blood Pressure from Last 3 Encounters:   12/15/15 129/72   09/22/15 124/74    Weight from Last 3 Encounters:   12/15/15 62.6 kg (138 lb)   09/22/15 63 kg (139 lb)              We Performed the Following     CHIROPRAC MANIP,SPINAL,1-2 REGIONS     THERAPEUTIC EXERCISES        Primary Care Provider    Pcp Unknown Verified       No address on file        Thank you!     Thank you for choosing [facility]  [facility]  for your care. Our goal is always to provide you with excellent care. Hearing back from our patients is one way we can continue to improve our services. Please take a few minutes to complete the written survey that you may receive in the mail after your visit with us. Thank you!             Your Updated Medication List - Protect others around you: Learn how to safely use, store and throw away your medicines at www.disposemymeds.org.          This list is accurate as of: 17 11:59 PM.  Always use your most recent med list.                   Brand Name Dispense Instructions for use    CALCIUM/MAGNESIUM/ZINC PO      Vitamin D Metabolic Maintenance       DAILY HERBS RELAX/EASE TENSION PO          DIGESTIVE ENZYME PO          EPA PLUS PO          MELATONIN PO          NUTRITIONAL SUPPLEMENT PO      Liver GI Detox       UNABLE TO FIND      EstroDim

## 2017-05-21 NOTE — PROGRESS NOTES
[facility] [facility]  Apr 8, 2016    Subjective:  Valencia Roy   35 year old   female  Denies any changes in medications   CC: left medial ankle, chronic posterior tibialis dysfunction, general lower back L>R  Visit: 13  Goal: Be able to run beyond 2 miles, stand for 60 minutes without ache in foot  Location:  Left medial ankle, lower back pain L>R  Comes in today doing well. Had re-check with Dr. Valdovinos and it went well. Notes that he recommended that she goes to more neutral shoes and continue to wear super feet. Notes that her running is going well. Some days she only has mild tightness. Has some tightness over lateral ankle. Notes she continues to improve and is very pleased with progress. She denies any new issues. Notes her back is doing better as well. Notes tightness over left lower back. Denies any radiating pain.       Objective:  Inspection:  No SDD  No Scars  Normal Gait    Palpation:  No specific pain  Palpable soreness over L medial ankle, left posterior hip, general lower back, L QL  Myofascitis 2/4 noted over left posterior tibialis, L hip ER, L QL, LPS    ROM:  Lumbar flexion   90/90, tightness lower back  Lumbar extension  30/30, mild lower back discomfort   Right Hip IR  45/45  Left Hip IR  34/45  Right Hip ER  53/60  Left  hip ER  58/60  Ankle DF limited on L in talar neutral     MMT:  Left glute med 4/5 with no pain  Right glute med 5/5 with no pain  Left TFL 5/5 with lateral hip discomfort   Right TFL 5/5 with no pain    MET:  Left short leg    Squat:  Shift to left  Foot flare to right  Arm drop on left  B foot collapse     Lunge:  Left knee genu valgum  Leftt knee cross over     NAL:  Restricted SI on left side  Restricted talus L      Assessment:  NAL with associated myofascitis and weakness  Posterior tibialis dysfunction     Plan:  Patient tolerated treatment well today  Treatment Time: 45 minutes   41026 manipulation 1-2 segments: MET, Hip LAD  19641 manipulation: Manual  extremity  17102 Manual therapy: (ART, Graston, Strain Counter Strain, Fascial Manipulation, Cupping) performed over area of L hip ER, L posterior tibialis, L calf  37746 therapeutic exercise (20 minutes):  Self ankle mulligan, short foot lateral band walks, toe yoga, single leg balance on bosu ball, single leg hops on bosu ball, single leg hops and land off bosu ball, walking lunges with weight, single leg ball toss on trampoline, short foot activation exercises, alter G running: to be scheduled in [place], review of current PT exercises she is doing for back and ankle, made corrections as needed  Today: standing hip hikes, bird dogs on stability ball, Mgill sit ups  Strapping: ankle mulligan, hip IR  Taping:  Next visit: 4 week, today focused on fascial manipulation of medial lower leg, left posterior hip, L QL  Dry Needlin points along posterior tib (did not do today)  Shock wave: 15/7, medial ankle, tolerated well.       Atif Holliday DC, MEd, ATC

## 2017-06-08 ENCOUNTER — THERAPY VISIT (OUTPATIENT)
Dept: CHIROPRACTIC MEDICINE | Facility: CLINIC | Age: 37
End: 2017-06-08
Payer: COMMERCIAL

## 2017-06-08 DIAGNOSIS — M79.10 MYALGIA: ICD-10-CM

## 2017-06-08 DIAGNOSIS — M99.06 SOMATIC DYSFUNCTION OF LOWER EXTREMITIES: ICD-10-CM

## 2017-06-08 DIAGNOSIS — M99.05 SOMATIC DYSFUNCTION OF PELVIS REGION: ICD-10-CM

## 2017-06-08 DIAGNOSIS — M25.552 HIP PAIN, LEFT: ICD-10-CM

## 2017-06-08 DIAGNOSIS — G89.29 CHRONIC PAIN OF LEFT ANKLE: ICD-10-CM

## 2017-06-08 DIAGNOSIS — M25.572 CHRONIC PAIN OF LEFT ANKLE: ICD-10-CM

## 2017-06-08 PROCEDURE — 97110 THERAPEUTIC EXERCISES: CPT | Performed by: CHIROPRACTOR

## 2017-06-08 PROCEDURE — 98940 CHIROPRACT MANJ 1-2 REGIONS: CPT | Mod: AT | Performed by: CHIROPRACTOR

## 2017-06-08 NOTE — MR AVS SNAPSHOT
"              After Visit Summary   6/8/2017    Valencia Roy    MRN: [id number]           Patient Information     Date Of Birth          1980        Visit Information        Provider Department      6/8/2017 5:30 PM Atif Holliday DC [facility] Chiropractor        Today's Diagnoses     Somatic dysfunction of pelvis region        Somatic dysfunction of lower extremities        Chronic pain of left ankle        Hip pain, left        Myalgia           Follow-ups after your visit        Who to contact     If you have questions or need follow up information about today's clinic visit or your schedule please contact [facility] ATHLETIC [facility] CHIROPRACTOR directly at [phone].  Normal or non-critical lab and imaging results will be communicated to you by iAcademichart, letter or phone within 4 business days after the clinic has received the results. If you do not hear from us within 7 days, please contact the clinic through iAcademichart or phone. If you have a critical or abnormal lab result, we will notify you by phone as soon as possible.  Submit refill requests through [employer] or call your pharmacy and they will forward the refill request to us. Please allow 3 business days for your refill to be completed.          Additional Information About Your Visit        MyChart Information     [employer] lets you send messages to your doctor, view your test results, renew your prescriptions, schedule appointments and more. To sign up, go to [email or web address] . Click on \"Log in\" on the left side of the screen, which will take you to the Welcome page. Then click on \"Sign up Now\" on the right side of the page.     You will be asked to enter the access code listed below, as well as some personal information. Please follow the directions to create your username and password.     Your access code is: [email or web address]  Expires: 8/18/2017  9:00 PM     Your access code will "  in 90 days. If you need help or a new code, please call your [facility] clinic or [phone].        Care EveryWhere ID     This is your Care EveryWhere ID. This could be used by other organizations to access your [facility] medical records  [id number]         Blood Pressure from Last 3 Encounters:   12/15/15 129/72   09/22/15 124/74    Weight from Last 3 Encounters:   12/15/15 62.6 kg (138 lb)   09/22/15 63 kg (139 lb)              We Performed the Following     CHIROPRAC MANIP,SPINAL,1-2 REGIONS     THERAPEUTIC EXERCISES        Primary Care Provider    Pcp Unknown Verified       No address on file        Thank you!     Thank you for choosing [place] [facility]INE  [facility]  for your care. Our goal is always to provide you with excellent care. Hearing back from our patients is one way we can continue to improve our services. Please take a few minutes to complete the written survey that you may receive in the mail after your visit with us. Thank you!             Your Updated Medication List - Protect others around you: Learn how to safely use, store and throw away your medicines at www.disposemymeds.org.          This list is accurate as of: 17 11:59 PM.  Always use your most recent med list.                   Brand Name Dispense Instructions for use    CALCIUM/MAGNESIUM/ZINC PO      Vitamin D Metabolic Maintenance       DAILY HERBS RELAX/EASE TENSION PO          DIGESTIVE ENZYME PO          EPA PLUS PO          MELATONIN PO          NUTRITIONAL SUPPLEMENT PO      Liver GI Detox       UNABLE TO FIND      EstroDim

## 2017-06-10 NOTE — PROGRESS NOTES
[facility]  Apr 8, 2016    We did a 1 mile time trial tonight at [facility] and I got 6:50 :) I just was going to be mad if I didn't break 7, but now I want to get it down to 6:30...that will be closer to what I did in college anyway :)     Foot was great! (did 2 mile warm up, race warm up, 1 mile time trial, then 2 mile cool down) I am having more tingling in that nerve again today...not sure why...actually, maybe it's a little closer to my shin..I don't know, but it's ok it doesn't hurt anyway.    My right knee is also starting to get sore again...I think it's b/c I've been skating more, but was a little sore at running tonight too. Would you mind checking that again? I know I had you do that probably a year ago and you said everything was fine, but just want to make sure again. (and last year the pain just went away on its own) It's just under my knee cap...usual spot that seems to come and go, but been kind of persistent this past week or two.  Subjective:  Valencia Roy   35 year old   female  Denies any changes in medications   CC: left medial ankle, chronic posterior tibialis dysfunction, general lower back L>R  Visit: 14  Goal: Be able to run beyond 2 miles, stand for 60 minutes without ache in foot  Location:  Left medial ankle, lower back pain L>R  Comes in today doing well. She she sent email above prior to treatment. She is racing 5K this weekend. She notes some cramping sensation in left foot. Notes mild anterior knee pain on right.       Objective:  Inspection:  No SDD  No Scars  Normal Gait    Palpation:  No specific pain  Palpable soreness over L medial ankle, left posterior hip, general lower back, L QL, R knee  Myofascitis 2/4 noted over left posterior tibialis, L hip ER, L QL, LPS, R distal quad    ROM:  Lumbar flexion   90/90, tightness lower back  Lumbar extension  30/30, mild lower back discomfort   Right Hip IR  45/45  Left Hip IR  34/45  Right Hip ER  53/60  Left  hip  ER  58/60  Ankle DF limited on L in talar neutral     MMT:  Left glute med 4/5 with no pain  Right glute med 5/5 with no pain  Left TFL 5/5 with lateral hip discomfort   Right TFL 5/5 with no pain    MET:  Left short leg    Squat:  Shift to left  Foot flare to right  Arm drop on left  B foot collapse     Lunge:  Left knee genu valgum  Leftt knee cross over     NAL:  Restricted SI on left side  Restricted talus L      Assessment:  NAL with associated myofascitis and weakness  Posterior tibialis dysfunction     Plan:  Patient tolerated treatment well today  Treatment Time: 45 minutes   05729 manipulation 1-2 segments: MET, Hip LAD  80891 manipulation: Manual extremity  08831 Manual therapy: (ART, Graston, Strain Counter Strain, Fascial Manipulation, Cupping) performed over area of L hip ER, L posterior tibialis, L calf  04647 therapeutic exercise (20 minutes):  Self ankle mulligan, short foot lateral band walks, toe yoga, single leg balance on bosu ball, single leg hops on bosu ball, single leg hops and land off bosu ball, walking lunges with weight, single leg ball toss on trampoline, short foot activation exercises, alter G running: to be scheduled in [place], review of current PT exercises she is doing for back and ankle, made corrections as needed  Today: standing hip hikes, bird dogs on stability ball, Mgill sit ups, clams shells, side lying hip rotation   Strapping: ankle mulligan, hip IR  Taping:  Next visit: 4 week, today focused on fascial manipulation of medial lower leg, left posterior hip, L QL  Dry Needlin with stim over left medial arch  Shock wave: 15/7, medial ankle, tolerated well.       Atif Holliday DC, MEd, ATC

## 2017-06-29 ENCOUNTER — THERAPY VISIT (OUTPATIENT)
Dept: CHIROPRACTIC MEDICINE | Facility: CLINIC | Age: 37
End: 2017-06-29
Payer: COMMERCIAL

## 2017-06-29 DIAGNOSIS — M25.552 HIP PAIN, LEFT: ICD-10-CM

## 2017-06-29 DIAGNOSIS — M25.572 CHRONIC PAIN OF LEFT ANKLE: ICD-10-CM

## 2017-06-29 DIAGNOSIS — M79.10 MYALGIA: ICD-10-CM

## 2017-06-29 DIAGNOSIS — M99.05 SOMATIC DYSFUNCTION OF PELVIS REGION: Primary | ICD-10-CM

## 2017-06-29 DIAGNOSIS — G89.29 CHRONIC PAIN OF LEFT ANKLE: ICD-10-CM

## 2017-06-29 DIAGNOSIS — M99.06 SOMATIC DYSFUNCTION OF LOWER EXTREMITIES: ICD-10-CM

## 2017-06-29 PROCEDURE — 98940 CHIROPRACT MANJ 1-2 REGIONS: CPT | Mod: AT | Performed by: CHIROPRACTOR

## 2017-06-29 PROCEDURE — 97110 THERAPEUTIC EXERCISES: CPT | Performed by: CHIROPRACTOR

## 2017-06-29 NOTE — PROGRESS NOTES
[facility]  Apr 8, 2016      Subjective:  Valencia Roy   35 year old   female  Denies any changes in medications   CC: left medial ankle, chronic posterior tibialis dysfunction, general lower back L>R  Visit: 15  Goal: Be able to run beyond 2 miles, stand for 60 minutes without ache in foot  Location:  Left medial ankle, lower back pain L>R  Comes in today doing well. Notes training for 5K. Running long over 10 causes most foot discomfort. Notes that knee is doing well. Notes that knee is sore with skating. Notes that back is doing very well with Pilates. Notes isolated more toward left glute med. Would like to continue with shockwave over left medial heel.       Objective:  Inspection:  No SDD  No Scars  Normal Gait    Palpation:  No specific pain  Palpable soreness over L medial ankle, left posterior hip, general lower back, L QL, R knee  Myofascitis 2/4 noted over left posterior tibialis, L hip ER, L QL, LPS, R distal quad    ROM:  Lumbar flexion   90/90, tightness lower back  Lumbar extension  30/30, mild lower back discomfort   Right Hip IR  45/45  Left Hip IR  34/45  Right Hip ER  53/60  Left  hip ER  58/60  Ankle DF limited on L in talar neutral     MMT:  Left glute med 4/5 with no pain  Right glute med 5/5 with no pain  Left TFL 5/5 with lateral hip discomfort   Right TFL 5/5 with no pain    MET:  Left short leg    Squat:  Shift to left  Foot flare to right  Arm drop on left  B foot collapse     Lunge:  Left knee genu valgum  Leftt knee cross over     NAL:  Restricted SI on left side  Restricted talus L      Assessment:  NAL with associated myofascitis and weakness  Posterior tibialis dysfunction     Plan:  Patient tolerated treatment well today  Treatment Time: 45 minutes   52230 manipulation 1-2 segments: MET, Hip LAD  12194 manipulation: Manual extremity  02649 Manual therapy: (ART, Graston, Strain Counter Strain, Fascial Manipulation, Cupping) performed over area of L hip ER, L  posterior tibialis, L calf  13621 therapeutic exercise (20 minutes):  Self ankle mulligan, short foot lateral band walks, toe yoga, single leg balance on bosu ball, single leg hops on bosu ball, single leg hops and land off bosu ball, walking lunges with weight, single leg ball toss on trampoline, short foot activation exercises, alter G running: to be scheduled in [place], review of current PT exercises she is doing for back and ankle, made corrections as needed  Today: standing hip hikes, bird dogs on stability ball, Mgill sit ups, clams shells, side lying hip rotation   Strapping: ankle mulligan, hip IR  Taping:  Next visit: 4 week, today focused on fascial manipulation of medial lower leg, left posterior hip, L QL  Dry Needlin with stim over left medial arch  Shock wave: 15/7, medial ankle, tolerated well.       Atif Holliday DC, MEd, ATC

## 2017-06-29 NOTE — MR AVS SNAPSHOT
"              After Visit Summary   6/29/2017    Valencia Roy    MRN: [id number]           Patient Information     Date Of Birth          1980        Visit Information        Provider Department      6/29/2017 6:45 PM Atif Holliday DC [facility] Chiropractor        Today's Diagnoses     Somatic dysfunction of pelvis region    -  1    Somatic dysfunction of lower extremities        Chronic pain of left ankle        Hip pain, left        Myalgia           Follow-ups after your visit        Who to contact     If you have questions or need follow up information about today's clinic visit or your schedule please contact [facility] directly at [phone].  Normal or non-critical lab and imaging results will be communicated to you by ByAllAccountshart, letter or phone within 4 business days after the clinic has received the results. If you do not hear from us within 7 days, please contact the clinic through ByAllAccountshart or phone. If you have a critical or abnormal lab result, we will notify you by phone as soon as possible.  Submit refill requests through FamilyLink or call your pharmacy and they will forward the refill request to us. Please allow 3 business days for your refill to be completed.          Additional Information About Your Visit        MyChart Information     FamilyLink lets you send messages to your doctor, view your test results, renew your prescriptions, schedule appointments and more. To sign up, go to [email or web address] . Click on \"Log in\" on the left side of the screen, which will take you to the Welcome page. Then click on \"Sign up Now\" on the right side of the page.     You will be asked to enter the access code listed below, as well as some personal information. Please follow the directions to create your username and password.     Your access code is: [email or web address]  Expires: 8/18/2017  9:00 PM     Your access code " will  in 90 days. If you need help or a new code, please call your [place] clinic or [phone].        Care EveryWhere ID     This is your Care EveryWhere ID. This could be used by other organizations to access your [place] medical records  [id number]         Blood Pressure from Last 3 Encounters:   12/15/15 129/72   09/22/15 124/74    Weight from Last 3 Encounters:   12/15/15 62.6 kg (138 lb)   09/22/15 63 kg (139 lb)              We Performed the Following     CHIROPRAC MANIP,SPINAL,1-2 REGIONS     THERAPEUTIC EXERCISES        Primary Care Provider    Pcp Unknown Verified       No address on file        Equal Access to Services     BHAKTI [facility] Center[facility] : Hadii renee Krishnan, sade nicole, radha peterson, kym dixon . So [facility] [phone].    ATENCIÓN: Si habla español, tiene a martínez disposición servicios gratuitos de asistencia lingüística. Llame al [phone].    We comply with applicable federal civil rights laws and [place] laws. We do not discriminate on the basis of race, color, national origin, age, disability sex, sexual orientation or gender identity.            Thank you!     Thank you for choosing [facility]  for your care. Our goal is always to provide you with excellent care. Hearing back from our patients is one way we can continue to improve our services. Please take a few minutes to complete the written survey that you may receive in the mail after your visit with us. Thank you!             Your Updated Medication List - Protect others around you: Learn how to safely use, store and throw away your medicines at www.disposemymeds.org.          This list is accurate as of: 17 11:59 PM.  Always use your most recent med list.                   Brand Name Dispense Instructions for use Diagnosis    CALCIUM/MAGNESIUM/ZINC PO      Vitamin D Metabolic Maintenance        DAILY HERBS RELAX/EASE  TENSION PO           DIGESTIVE ENZYME PO           EPA PLUS PO           MELATONIN PO           NUTRITIONAL SUPPLEMENT PO      Liver GI Detox        UNABLE TO FIND      EstroDim

## 2017-07-20 ENCOUNTER — THERAPY VISIT (OUTPATIENT)
Dept: CHIROPRACTIC MEDICINE | Facility: CLINIC | Age: 37
End: 2017-07-20
Payer: COMMERCIAL

## 2017-07-20 DIAGNOSIS — M25.552 HIP PAIN, LEFT: ICD-10-CM

## 2017-07-20 DIAGNOSIS — M99.06 SOMATIC DYSFUNCTION OF LOWER EXTREMITIES: ICD-10-CM

## 2017-07-20 DIAGNOSIS — M25.572 CHRONIC PAIN OF LEFT ANKLE: ICD-10-CM

## 2017-07-20 DIAGNOSIS — M99.05 SOMATIC DYSFUNCTION OF PELVIS REGION: ICD-10-CM

## 2017-07-20 DIAGNOSIS — G89.29 CHRONIC PAIN OF LEFT ANKLE: ICD-10-CM

## 2017-07-20 DIAGNOSIS — M79.10 MYALGIA: ICD-10-CM

## 2017-07-20 PROCEDURE — 98940 CHIROPRACT MANJ 1-2 REGIONS: CPT | Mod: AT | Performed by: CHIROPRACTOR

## 2017-07-20 PROCEDURE — 97110 THERAPEUTIC EXERCISES: CPT | Performed by: CHIROPRACTOR

## 2017-07-20 NOTE — MR AVS SNAPSHOT
"              After Visit Summary   7/20/2017    Valencia Roy    MRN: [id number]           Patient Information     Date Of Birth          1980        Visit Information        Provider Department      7/20/2017 5:30 PM Atif Holliday DC [facility]        Today's Diagnoses     Somatic dysfunction of pelvis region        Somatic dysfunction of lower extremities        Chronic pain of left ankle        Hip pain, left        Myalgia           Follow-ups after your visit        Your next 10 appointments already scheduled     Aug 15, 2017 10:00 AM CDT   New Patient Visit with Kailyn Ballesteros MD   [facility] ([facility])    [place] [facility] [address]  [address]  [facility] [address] [phone]              Who to contact     If you have questions or need follow up information about today's clinic visit or your schedule please contact [facility] [facility] directly at [phone].  Normal or non-critical lab and imaging results will be communicated to you by MyChart, letter or phone within 4 business days after the clinic has received the results. If you do not hear from us within 7 days, please contact the clinic through ProteoSensehart or phone. If you have a critical or abnormal lab result, we will notify you by phone as soon as possible.  Submit refill requests through Clip Interactive or call your pharmacy and they will forward the refill request to us. Please allow 3 business days for your refill to be completed.          Additional Information About Your Visit        MyChart Information     Clip Interactive lets you send messages to your doctor, view your test results, renew your prescriptions, schedule appointments and more. To sign up, go to [email or web address] . Click on \"Log in\" on the left side of the screen, which will take you to the Welcome page. Then " "click on \"Sign up Now\" on the right side of the page.     You will be asked to enter the access code listed below, as well as some personal information. Please follow the directions to create your username and password.     Your access code is: [email or web address]  Expires: 2017  9:00 PM     Your access code will  in 90 days. If you need help or a new code, please call your [place] clinic or [phone].        Care EveryWhere ID     This is your Care EveryWhere ID. This could be used by other organizations to access your [place] medical records  [id number]         Blood Pressure from Last 3 Encounters:   12/15/15 129/72   09/22/15 124/74    Weight from Last 3 Encounters:   12/15/15 62.6 kg (138 lb)   09/22/15 63 kg (139 lb)              We Performed the Following     CHIROPRAC MANIP,SPINAL,1-2 REGIONS     THERAPEUTIC EXERCISES        Primary Care Provider    Pcp Unknown Verified       No address on file        Equal Access to Services     RENY LINDSEY : Hadii aad ku hadasho Soomaali, waaxda luqadaha, qaybta kaalmada adeegyada, waxay yefri dixon . So [facility] [phone].    ATENCIÓN: Si habla español, tiene a martínez disposición servicios gratuitos de asistencia lingüística. Llame al [phone].    We comply with applicable federal civil rights laws and [place] laws. We do not discriminate on the basis of race, color, national origin, age, disability sex, sexual orientation or gender identity.            Thank you!     Thank you for choosing [facility]  for your care. Our goal is always to provide you with excellent care. Hearing back from our patients is one way we can continue to improve our services. Please take a few minutes to complete the written survey that you may receive in the mail after your visit with us. Thank you!             Your Updated Medication List - Protect others around you: Learn how to safely use, store and throw " away your medicines at www.disposemymeds.org.          This list is accurate as of: 7/20/17  8:44 PM.  Always use your most recent med list.                   Brand Name Dispense Instructions for use Diagnosis    CALCIUM/MAGNESIUM/ZINC PO      Vitamin D Metabolic Maintenance        DAILY HERBS RELAX/EASE TENSION PO           DIGESTIVE ENZYME PO           EPA PLUS PO           MELATONIN PO           NUTRITIONAL SUPPLEMENT PO      Liver GI Detox        UNABLE TO FIND      EstroDim

## 2017-07-21 NOTE — PROGRESS NOTES
[facility]  Apr 8, 2016      Subjective:  Valencia Roy   35 year old   female  Denies any changes in medications   CC: left medial ankle, chronic posterior tibialis dysfunction, general lower back L>R  Visit: 16  Goal: Be able to run beyond 2 miles, stand for 60 minutes without ache in foot  Location:  Left medial ankle, lower back pain L>R  Comes in today doing well. Notes training for 5K. 4 days ago rolled her left ankle and had mild sprain. She has had multiple sprains in past so knows that this is mild. Notes mild swelling. Took 3 days off of running but doing better. Notes left lower back over area of hip is sore. Notes some left hamstring and thigh discomfort with activity. Denies any new issues. Notes medial ankle is doing very well and she is very pleased with progress. She continues to work on active care program.     Objective:  Inspection:  No SDD  No Scars  Normal Gait    Palpation:  No specific pain  Palpable soreness over L medial ankle, left posterior hip, general lower back, L QL, L lateral ankle  Myofascitis 2/4 noted over left posterior tibialis, L hip ER, L QL, LPS    ROM:  Lumbar flexion   90/90, tightness lower back  Lumbar extension  30/30, mild lower back discomfort   Right Hip IR  45/45  Left Hip IR  34/45  Right Hip ER  53/60  Left  hip ER  58/60  Ankle DF limited on L in talar neutral     MMT:  Left glute med 4/5 with no pain  Right glute med 5/5 with no pain  Left TFL 5/5 with lateral hip discomfort   Right TFL 5/5 with no pain    MET:  Left short leg    Squat:  Shift to left  Foot flare to right  Arm drop on left  B foot collapse     Lunge:  Left knee genu valgum  Leftt knee cross over     NAL:  Restricted SI on left side  Restricted talus L      Assessment:  NAL with associated myofascitis and weakness  Posterior tibialis dysfunction   Mild ankle sprain    Plan:  Patient tolerated treatment well today  Treatment Time: 45 minutes   96359 manipulation 1-2  segments: MET, Hip LAD  88091 manipulation: Manual extremity  14672 Manual therapy: (ART, Graston, Strain Counter Strain, Fascial Manipulation, Cupping) performed over area of L hip ER, L posterior tibialis, L calf  53321 therapeutic exercise (20 minutes):  Self ankle mulligan, short foot lateral band walks, toe yoga, single leg balance on bosu ball, single leg hops on bosu ball, single leg hops and land off bosu ball, walking lunges with weight, single leg ball toss on trampoline, short foot activation exercises, alter G running: to be scheduled in [place], review of current PT exercises she is doing for back and ankle, made corrections as needed  Today: standing hip hikes, bird dogs on stability ball, Mgill sit ups, clams shells, side lying hip rotation, standing QL stretch, single leg balance    Strapping: ankle mulligan, hip IR  Taping:  Next visit: 4 week, today focused on fascial manipulation of medial lower leg, left posterior hip, L QL  Dry Needling:    Shock wave: 20/9 over iliac crest on left      Atif Holliday DC, MEd, ATC

## 2017-08-17 ENCOUNTER — THERAPY VISIT (OUTPATIENT)
Dept: CHIROPRACTIC MEDICINE | Facility: CLINIC | Age: 37
End: 2017-08-17

## 2017-08-17 DIAGNOSIS — M79.10 MYALGIA: ICD-10-CM

## 2017-08-17 DIAGNOSIS — M99.05 SOMATIC DYSFUNCTION OF PELVIS REGION: ICD-10-CM

## 2017-08-17 DIAGNOSIS — M99.06 SOMATIC DYSFUNCTION OF LOWER EXTREMITIES: ICD-10-CM

## 2017-08-17 DIAGNOSIS — G89.29 CHRONIC PAIN OF LEFT ANKLE: ICD-10-CM

## 2017-08-17 DIAGNOSIS — M25.552 HIP PAIN, LEFT: ICD-10-CM

## 2017-08-17 DIAGNOSIS — M25.572 CHRONIC PAIN OF LEFT ANKLE: ICD-10-CM

## 2017-08-17 NOTE — MR AVS SNAPSHOT
"              After Visit Summary   8/17/2017    Valencia Roy    MRN: [id number]           Patient Information     Date Of Birth          1980        Visit Information        Provider Department      8/17/2017 6:00 PM Atif Holliday DC [facility] [place] Chiropractor        Today's Diagnoses     Somatic dysfunction of pelvis region        Somatic dysfunction of lower extremities        Chronic pain of left ankle        Hip pain, left        Myalgia           Follow-ups after your visit        Your next 10 appointments already scheduled     Sep 05, 2017  9:00 AM CDT   New Patient Visit with Kailyn Ballesteros MD   [facility] ([facility])    [place] [facility] [address]  [address]  [facility] [address] [phone]              Who to contact     If you have questions or need follow up information about today's clinic visit or your schedule please contact [place] [facility] directly at [phone].  Normal or non-critical lab and imaging results will be communicated to you by MyChart, letter or phone within 4 business days after the clinic has received the results. If you do not hear from us within 7 days, please contact the clinic through "[employer]"hart or phone. If you have a critical or abnormal lab result, we will notify you by phone as soon as possible.  Submit refill requests through Acunu or call your pharmacy and they will forward the refill request to us. Please allow 3 business days for your refill to be completed.          Additional Information About Your Visit        MyChart Information     Acunu lets you send messages to your doctor, view your test results, renew your prescriptions, schedule appointments and more. To sign up, go to [email or web address] . Click on \"Log in\" on the left side of the screen, which will take you to the Welcome page. Then " "click on \"Sign up Now\" on the right side of the page.     You will be asked to enter the access code listed below, as well as some personal information. Please follow the directions to create your username and password.     Your access code is: [email or web address]  Expires: 2017  9:00 PM     Your access code will  in 90 days. If you need help or a new code, please call your [place] clinic or [phone].        Care EveryWhere ID     This is your Care EveryWhere ID. This could be used by other organizations to access your [place] medical records  [id number]         Blood Pressure from Last 3 Encounters:   12/15/15 129/72   09/22/15 124/74    Weight from Last 3 Encounters:   12/15/15 62.6 kg (138 lb)   09/22/15 63 kg (139 lb)              We Performed the Following     NO CHARGE LOS        Primary Care Provider    Pcp Unknown Verified       No address on file        Equal Access to Services     RENY LINDSEY : Hadii renee Krishnan, washawn nicole, qaybta kaalmada kristen, kym dixon . So [facility] [phone].    ATENCIÓN: Si habla español, tiene a martínez disposición servicios gratuitos de asistencia lingüística. Llame al [phone].    We comply with applicable federal civil rights laws and [place] laws. We do not discriminate on the basis of race, color, national origin, age, disability sex, sexual orientation or gender identity.            Thank you!     Thank you for choosing [facility]  for your care. Our goal is always to provide you with excellent care. Hearing back from our patients is one way we can continue to improve our services. Please take a few minutes to complete the written survey that you may receive in the mail after your visit with us. Thank you!             Your Updated Medication List - Protect others around you: Learn how to safely use, store and throw away your medicines at www.disposemymeds.org.    "       This list is accurate as of: 8/17/17 11:59 PM.  Always use your most recent med list.                   Brand Name Dispense Instructions for use Diagnosis    CALCIUM/MAGNESIUM/ZINC PO      Vitamin D Metabolic Maintenance        DAILY HERBS RELAX/EASE TENSION PO           DIGESTIVE ENZYME PO           EPA PLUS PO           MELATONIN PO           NUTRITIONAL SUPPLEMENT PO      Liver GI Detox        UNABLE TO FIND      EstroDim
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
## 2017-08-26 ASSESSMENT — ENCOUNTER SYMPTOMS
TROUBLE SWALLOWING: 0
STIFFNESS: 0
JOINT SWELLING: 0
ARTHRALGIAS: 0
SKIN CHANGES: 0
SINUS CONGESTION: 0
TASTE DISTURBANCE: 0
NECK MASS: 0
SMELL DISTURBANCE: 0
SORE THROAT: 0
SINUS PAIN: 0
MUSCLE CRAMPS: 0
MYALGIAS: 0
BACK PAIN: 1
NECK PAIN: 1
HOARSE VOICE: 0
NAIL CHANGES: 0
POOR WOUND HEALING: 0
MUSCLE WEAKNESS: 0

## 2017-08-26 ASSESSMENT — ANXIETY QUESTIONNAIRES
5. BEING SO RESTLESS THAT IT IS HARD TO SIT STILL: NOT AT ALL
2. NOT BEING ABLE TO STOP OR CONTROL WORRYING: NOT AT ALL
7. FEELING AFRAID AS IF SOMETHING AWFUL MIGHT HAPPEN: NOT AT ALL
7. FEELING AFRAID AS IF SOMETHING AWFUL MIGHT HAPPEN: NOT AT ALL
GAD7 TOTAL SCORE: 0
6. BECOMING EASILY ANNOYED OR IRRITABLE: NOT AT ALL
1. FEELING NERVOUS, ANXIOUS, OR ON EDGE: NOT AT ALL
3. WORRYING TOO MUCH ABOUT DIFFERENT THINGS: NOT AT ALL
4. TROUBLE RELAXING: NOT AT ALL

## 2017-08-27 ASSESSMENT — ANXIETY QUESTIONNAIRES: GAD7 TOTAL SCORE: 0

## 2017-09-01 ENCOUNTER — THERAPY VISIT (OUTPATIENT)
Dept: CHIROPRACTIC MEDICINE | Facility: CLINIC | Age: 37
End: 2017-09-01
Payer: COMMERCIAL

## 2017-09-01 DIAGNOSIS — M79.10 MYALGIA: ICD-10-CM

## 2017-09-01 DIAGNOSIS — M54.50 CHRONIC LEFT-SIDED LOW BACK PAIN WITHOUT SCIATICA: ICD-10-CM

## 2017-09-01 DIAGNOSIS — G89.29 CHRONIC LEFT-SIDED LOW BACK PAIN WITHOUT SCIATICA: ICD-10-CM

## 2017-09-01 DIAGNOSIS — M99.05 SOMATIC DYSFUNCTION OF PELVIS REGION: ICD-10-CM

## 2017-09-01 DIAGNOSIS — M25.552 HIP PAIN, LEFT: Primary | ICD-10-CM

## 2017-09-01 PROCEDURE — 98940 CHIROPRACT MANJ 1-2 REGIONS: CPT | Mod: AT | Performed by: CHIROPRACTOR

## 2017-09-01 PROCEDURE — 97110 THERAPEUTIC EXERCISES: CPT | Performed by: CHIROPRACTOR

## 2017-09-01 NOTE — MR AVS SNAPSHOT
After Visit Summary   9/1/2017    Valencia Roy    MRN: [id number]           Patient Information     Date Of Birth          1980        Visit Information        Provider Department      9/1/2017 5:45 PM Atif Holliday DC [facility]        Today's Diagnoses     Hip pain, left    -  1    Somatic dysfunction of pelvis region        Myalgia        Chronic left-sided low back pain without sciatica           Follow-ups after your visit        Your next 10 appointments already scheduled     Sep 05, 2017  9:00 AM CDT   New Patient Visit with Kailyn Ballesteros MD   [facility] ([facility])    [facility] [address]  [address]  [facility] [address] [phone]              Who to contact     If you have questions or need follow up information about today's clinic visit or your schedule please contact [facility] directly at [phone].  Normal or non-critical lab and imaging results will be communicated to you by SurveySn[employer]art, letter or phone within 4 business days after the clinic has received the results. If you do not hear from us within 7 days, please contact the clinic through SurveySnaphart or phone. If you have a critical or abnormal lab result, we will notify you by phone as soon as possible.  Submit refill requests through [employer] or call your pharmacy and they will forward the refill request to us. Please allow 3 business days for your refill to be completed.          Additional Information About Your Visit        MyChart Information     [employer] gives you secure access to your electronic health record. If you see a primary care provider, you can also send messages to your care team and make appointments. If you have questions, please call your primary care clinic.  If you do not have a primary care provider, please call  [phone] and they will assist you.        Care EveryWhere ID     This is your Care EveryWhere ID. This could be used by other organizations to access your [place] medical records  [id number]         Blood Pressure from Last 3 Encounters:   12/15/15 129/72   09/22/15 124/74    Weight from Last 3 Encounters:   12/15/15 62.6 kg (138 lb)   09/22/15 63 kg (139 lb)              We Performed the Following     CHIROPRAC MANIP,SPINAL,1-2 REGIONS     THERAPEUTIC EXERCISES        Primary Care Provider    Pcp Unknown Verified       No address on file        Equal Access to Services     [facility]: Hadii renee perez Sokapil, waaxda luqadaha, qaybemanuel kaalmaalice peterson, kym dixon . So [facility] [phone].    ATENCIÓN: Si habla español, tiene a martínez disposición servicios gratuitos de asistencia lingüística. Llame al [phone].    We comply with applicable federal civil rights laws and [place] laws. We do not discriminate on the basis of race, color, national origin, age, disability sex, sexual orientation or gender identity.            Thank you!     Thank you for choosing [facility]  [facility] your care. Our goal is always to provide you with excellent care. Hearing back from our patients is one way we can continue to improve our services. Please take a few minutes to complete the written survey that you may receive in the mail after your visit with us. Thank you!             Your Updated Medication List - Protect others around you: Learn how to safely use, store and throw away your medicines at www.disposemymeds.org.          This list is accurate as of: 9/1/17 11:59 PM.  Always use your most recent med list.                   Brand Name Dispense Instructions for use Diagnosis    CALCIUM/MAGNESIUM/ZINC PO      Vitamin D Metabolic Maintenance        DAILY HERBS RELAX/EASE TENSION PO           DIGESTIVE ENZYME PO           EPA PLUS PO            MELATONIN PO           NUTRITIONAL SUPPLEMENT PO      Liver GI Detox        UNABLE TO FIND      EstroDim

## 2017-09-02 PROBLEM — M99.05 SOMATIC DYSFUNCTION OF PELVIS REGION: Status: RESOLVED | Noted: 2017-02-05 | Resolved: 2017-09-02

## 2017-09-02 PROBLEM — M99.06 SOMATIC DYSFUNCTION OF LOWER EXTREMITIES: Status: RESOLVED | Noted: 2017-02-05 | Resolved: 2017-09-02

## 2017-09-02 PROBLEM — M54.50 CHRONIC LEFT-SIDED LOW BACK PAIN WITHOUT SCIATICA: Status: ACTIVE | Noted: 2017-09-02

## 2017-09-02 PROBLEM — G89.29 CHRONIC LEFT-SIDED LOW BACK PAIN WITHOUT SCIATICA: Status: ACTIVE | Noted: 2017-09-02

## 2017-09-02 NOTE — PROGRESS NOTES
[facility]      Subjective:  Valencia Roy   37 year old   female  Denies any changes in medications   CC:  general lower back L>R, L posterior hip   Visit: 17  Goal: run over 60 minutes without hip and back pain  Location:   lower back pain L>R  Comes in today doing well. Her foot is doing very well and she is very pleased with that. She notes that her left hip and lower back now is worst issues. She has seen multiple providers for this and currently works with [employer] [facility] once per week. She currently denies any radiating pain, but has had left leg pain in past. She notes pain is 5/10 and worse with sitting for long periods or after long runs. She asked if I could focus on that since her foot is doing so well.     Objective:  Inspection:  No SDD  No Scars  Normal Gait    Palpation:  No specific pain  Palpable soreness over left posterior hip, general lower back, L QL,   Myofascitis 2/4 noted over  L hip ER, L QL, LPS    ROM:  Lumbar flexion   90/90, tightness lower back  Lumbar extension  30/30, mild lower back discomfort   Right Hip IR  45/45  Left Hip IR  34/45  Right Hip ER  53/60  Left  hip ER  58/60  Ankle DF limited on L in talar neutral     MMT:  Left glute med 4/5 with no pain  Right glute med 5/5 with no pain  Left TFL 5/5 with lateral hip discomfort   Right TFL 5/5 with no pain    MET:  Left short leg    Squat:  Shift to left  Foot flare to right  Arm drop on left  B foot collapse     Lunge:  Left knee genu valgum  Leftt knee cross over     NAL:  Restricted SI on left side  Restricted talus L    Kemps (left lower back discomfort), SLR (-)  Able to toe walk and heel walk      Assessment:  NAL with associated myofascitis and weakness  Posterior tibialis dysfunction   Mild ankle sprain    Plan:  Patient tolerated treatment well today  Treatment Time: 45 minutes   55427 manipulation 1-2 segments: MET, Hip LAD  54993 manipulation: Manual extremity  18607 Manual therapy: (ART, Sangeetha,  Strain Counter Strain, Fascial Manipulation, Cupping) performed over area of L hip ER, L QL  36178 therapeutic exercise (20 minutes):  Self ankle mulligan, short foot lateral band walks, toe yoga, single leg balance on bosu ball, single leg hops on bosu ball, single leg hops and land off bosu ball, walking lunges with weight, single leg ball toss on trampoline, short foot activation exercises, alter G running: to be scheduled in [place], review of current PT exercises she is doing for back and ankle, made corrections as needed, standing hip hikes, bird dogs on stability ball, Mgill sit ups, clams shells, side lying hip rotation, standing QL stretch, single leg balance    Today: 4 way hip IR (will do 2 of them every other day)  Strapping: 3 way hip mobility   Next visit: 3 weeks  Dry Needling:  L posterior hip, L sciatic nerve, L TFL      Atif Holliday DC, MEd, ATC

## 2017-09-05 ENCOUNTER — APPOINTMENT (OUTPATIENT)
Dept: LAB | Facility: CLINIC | Age: 37
End: 2017-09-05
Attending: OBSTETRICS & GYNECOLOGY
Payer: COMMERCIAL

## 2017-09-05 ENCOUNTER — OFFICE VISIT (OUTPATIENT)
Dept: OBGYN | Facility: CLINIC | Age: 37
End: 2017-09-05
Attending: OBSTETRICS & GYNECOLOGY
Payer: COMMERCIAL

## 2017-09-05 VITALS
BODY MASS INDEX: 24.34 KG/M2 | HEIGHT: 63 IN | WEIGHT: 137.4 LBS | SYSTOLIC BLOOD PRESSURE: 123 MMHG | DIASTOLIC BLOOD PRESSURE: 80 MMHG | HEART RATE: 58 BPM

## 2017-09-05 DIAGNOSIS — Z00.00 VISIT FOR PREVENTIVE HEALTH EXAMINATION: ICD-10-CM

## 2017-09-05 DIAGNOSIS — Z01.419 ENCOUNTER FOR GYNECOLOGICAL EXAMINATION WITHOUT ABNORMAL FINDING: ICD-10-CM

## 2017-09-05 DIAGNOSIS — Z00.00 ROUTINE GENERAL MEDICAL EXAMINATION AT A HEALTH CARE FACILITY: Primary | ICD-10-CM

## 2017-09-05 DIAGNOSIS — Z12.4 SCREENING FOR MALIGNANT NEOPLASM OF CERVIX: ICD-10-CM

## 2017-09-05 LAB
BASOPHILS # BLD AUTO: 0 10E9/L (ref 0–0.2)
BASOPHILS NFR BLD AUTO: 0.2 %
DEPRECATED CALCIDIOL+CALCIFEROL SERPL-MC: 21 UG/L (ref 20–75)
DIFFERENTIAL METHOD BLD: NORMAL
EOSINOPHIL # BLD AUTO: 0.2 10E9/L (ref 0–0.7)
EOSINOPHIL NFR BLD AUTO: 2.1 %
ERYTHROCYTE [DISTWIDTH] IN BLOOD BY AUTOMATED COUNT: 12.6 % (ref 10–15)
HCT VFR BLD AUTO: 42 % (ref 35–47)
HGB BLD-MCNC: 13.8 G/DL (ref 11.7–15.7)
HIV 1+2 AB+HIV1 P24 AG SERPL QL IA: NONREACTIVE
IMM GRANULOCYTES # BLD: 0 10E9/L (ref 0–0.4)
IMM GRANULOCYTES NFR BLD: 0.2 %
LYMPHOCYTES # BLD AUTO: 1.4 10E9/L (ref 0.8–5.3)
LYMPHOCYTES NFR BLD AUTO: 17.8 %
MCH RBC QN AUTO: 29.2 PG (ref 26.5–33)
MCHC RBC AUTO-ENTMCNC: 32.9 G/DL (ref 31.5–36.5)
MCV RBC AUTO: 89 FL (ref 78–100)
MONOCYTES # BLD AUTO: 0.5 10E9/L (ref 0–1.3)
MONOCYTES NFR BLD AUTO: 6.5 %
NEUTROPHILS # BLD AUTO: 5.9 10E9/L (ref 1.6–8.3)
NEUTROPHILS NFR BLD AUTO: 73.2 %
NRBC # BLD AUTO: 0 10*3/UL
NRBC BLD AUTO-RTO: 0 /100
PLATELET # BLD AUTO: 310 10E9/L (ref 150–450)
RBC # BLD AUTO: 4.72 10E12/L (ref 3.8–5.2)
TSH SERPL DL<=0.005 MIU/L-ACNC: 1.36 MU/L (ref 0.4–4)
WBC # BLD AUTO: 8 10E9/L (ref 4–11)

## 2017-09-05 PROCEDURE — 87389 HIV-1 AG W/HIV-1&-2 AB AG IA: CPT | Performed by: OBSTETRICS & GYNECOLOGY

## 2017-09-05 PROCEDURE — 85025 COMPLETE CBC W/AUTO DIFF WBC: CPT | Performed by: OBSTETRICS & GYNECOLOGY

## 2017-09-05 PROCEDURE — 36415 COLL VENOUS BLD VENIPUNCTURE: CPT | Performed by: OBSTETRICS & GYNECOLOGY

## 2017-09-05 PROCEDURE — 82306 VITAMIN D 25 HYDROXY: CPT | Performed by: OBSTETRICS & GYNECOLOGY

## 2017-09-05 PROCEDURE — 84443 ASSAY THYROID STIM HORMONE: CPT | Performed by: OBSTETRICS & GYNECOLOGY

## 2017-09-05 PROCEDURE — 86780 TREPONEMA PALLIDUM: CPT | Performed by: OBSTETRICS & GYNECOLOGY

## 2017-09-05 PROCEDURE — 87624 HPV HI-RISK TYP POOLED RSLT: CPT | Performed by: OBSTETRICS & GYNECOLOGY

## 2017-09-05 PROCEDURE — G0145 SCR C/V CYTO,THINLAYER,RESCR: HCPCS | Performed by: OBSTETRICS & GYNECOLOGY

## 2017-09-05 NOTE — PATIENT INSTRUCTIONS

## 2017-09-05 NOTE — MR AVS SNAPSHOT
After Visit Summary   9/5/2017    Valencia Roy    MRN: [id number]           Patient Information     Date Of Birth          1980        Visit Information        Provider Department      9/5/2017 9:00 AM Kailyn Herrera MD [facility]        Today's Diagnoses     Routine general medical examination at a health care facility    -  1    Visit for preventive health examination        Screening for malignant neoplasm of cervix        Encounter for gynecological examination without abnormal finding          Care Instructions      PREVENTIVE HEALTH RECOMMENDATIONS:   Most women need a yearly breast and pelvic exam.    A PAP screen, a test done DURING a pelvic exam, is NO longer recommended yearly.    March 2013, screening guidelines recommended by ACOG for PAP screen are:    1) First pap at age 21.    2) Pap every 3 years until age 30.    3) After age 30, pap every 3 years or Pap with HR HPV screen every 5 years until age 65.  4) Women do NOT need a vaginal Pap screen after a hysterectomy (surgical removal of the uterus) when they have not had cancer.    Exceptions:  1) Yearly pap if HIV+ or immunosuppressed secondary to organ transplant  2) CLINTON II-III continue routine screening for 20 years.    I encourage you continue looking for opportunities to choose a healthy lifestyle:       * Choose to eat a heart healthy diet. Check out the FOOD PLATE guidelines at: http://www.choosemyplate.gov/ for helpful hints on weight and cholesterol management.  Balance your caloric intake with exercise to maintain a BMI in the 22 to 26 range. For bone health: Eat calcium-rich foods like yogurt, broccoli or take chewable calcium pills (500 to 600 mg) twice a day with food.       * Exercise for at least an average of 30 minutes a day, 5 days of the week. This will help you control your weight, release stress, and help prevent disease.      * Take a Vitamin D3 supplement daily fall  through spring and during summer unless you [id number]' full body sun exposure to skin without sunscreen.      * DO wear sunscreen to prevent skin cancer after the first 15-30 minutes.      * Identify stressors in your life, find ways to release the stress, and, make time for yourself. PLEASE ask for help if mood changes last longer than two weeks.     * Limit alcohol to one drink per day.  No smoking.  Avoid second hand smoke. If you smoke, ask for help to stop.       *  If you are in a sexual relationship, talk with your partner about possible infection risks and take action to protect yourself from exposure to a sexual infection.    Please request an infection screen for STIs (sexually transmitted infections) if you are less than age 26 OR believe that you may be at risk.     Get a flu shot each year. Get a tetanus shot every 10 years. EVERYONE needs a pertussis (Whooping cough) booster.    See your dentist twice a year for an exam and preventive care cleaning.     Consider the following screen tests:    1) cholesterol test every 5 years.     2) yearly mammogram after age 40 unless you have identified risks.    3) colonoscopy every 10 years after age 50 unless you have identified risks.    4) diabetes blood test screening if you are at risk for diabetes.      Additional information that you may also find helpful:  The Internet now gives us access to LOTS of information -- some of it helpful, research documented and also plenty of harmful, anecdotal information that may not pertain to your situtaion. Consider visiting the following websites for accurate health information:    www.vitamindcouncil.org/ : Info and ongoing research re Vitamin D    www.fairview.org : Up to date and easily searchable information on multiple topics.    www.medlineplus.gov : medication info, interactive tutorials, watch real surgeries online    www.cdc.gov : public health info, travel advisories, epidemics (H1N1)    www.ang/std.org:  "current research re diagnosis, treatment and prevention of sexually contacted infections.    www.health.state.[place] : [place] dept of heat, public health issues in [place], N1N1    www.familydoctor.org : good info from the Academy of Family Physicians                Follow-ups after your visit        Follow-up notes from your care team     Return in 1 year (on 9/5/2018) for Preventative Health Visit.      Who to contact     Please call your clinic at [phone] to:    Ask questions about your health    Make or cancel appointments    Discuss your medicines    Learn about your test results    Speak to your doctor   If you have compliments or concerns about an experience at your clinic, or if you wish to file a complaint, please contact [facility] Patient Relations at [phone] or email us at [email or web address]         Additional Information About Your Visit        GoMileshart Information     [employer] gives you secure access to your electronic health record. If you see a primary care provider, you can also send messages to your care team and make appointments. If you have questions, please call your primary care clinic.  If you do not have a primary care provider, please call [phone] and they will assist you.      [employer] is an electronic gateway that provides easy, online access to your medical records. With [employer], you can request a clinic appointment, read your test results, renew a prescription or communicate with your care team.     To access your existing account, please contact your [facility] or call [phone] for assistance.        Care EveryWhere ID     This is your Care EveryWhere ID. This could be used by other organizations to access your [facility] medical records  [id number]        Your Vitals Were     Pulse Height Last Period BMI (Body Mass Index)          58 1.594 m (5' 2.75\") 08/14/2017 (Exact Date) 24.53 kg/m2         Blood " Pressure from Last 3 Encounters:   09/05/17 123/80   12/15/15 129/72   09/22/15 124/74    Weight from Last 3 Encounters:   09/05/17 62.3 kg (137 lb 6.4 oz)   12/15/15 62.6 kg (138 lb)   09/22/15 63 kg (139 lb)              We Performed the Following     Anti Treponema [[id number]]     CBC with Platelets Differential     HIV Antigen Antibody Combo [[id number]]     HPV High Risk Types DNA Cervical     Pap imaged thin layer screen with HPV - recommended age 30 - 65 years (select HPV order below)     Pap Smear Exam [] Do Not Remove     Pelvic and Breast Exam Procedure []     TSH with free T4 reflex     Vitamin D deficiency screening        Primary Care Provider    Pcp Unknown Verified       No address on file        Equal Access to Services     [facility]EN[facility] LINDSEY : Niles Krishnan, washawn luskylar, qaybta kaalmada kristen, kym mc. So [facility] [phone].    ATENCIÓN: Si habla español, tiene a martínez disposición servicios gratuitos de asistencia lingüística. Llame al [phone].    We comply with applicable federal civil rights laws and [place] laws. We do not discriminate on the basis of race, color, national origin, age, disability sex, sexual orientation or gender identity.            Thank you!     Thank you for choosing [facility]  for your care. Our goal is always to provide you with excellent care. Hearing back from our patients is one way we can continue to improve our services. Please take a few minutes to complete the written survey that you may receive in the mail after your visit with us. Thank you!             Your Updated Medication List - Protect others around you: Learn how to safely use, store and throw away your medicines at www.disposemymeds.org.          This list is accurate as of: 9/5/17 11:59 PM.  Always use your most recent med list.                   Brand Name Dispense Instructions for use Diagnosis    CALCIUM/MAGNESIUM/ZINC PO       Vitamin D Metabolic Maintenance        DAILY HERBS RELAX/EASE TENSION PO           DIGESTIVE ENZYME PO           EPA PLUS PO           MELATONIN PO           NUTRITIONAL SUPPLEMENT PO      Liver GI Detox        UNABLE TO FIND      EstroDim

## 2017-09-05 NOTE — PROGRESS NOTES
SUBJECTIVE   Valencia Roy is a 37 year old G0, Patient's last menstrual period was 08/14/2017 (exact date)., here for an annual preventive exam.    Specific concerns today: questions about screening for STIs, 1 episode of urinary incontinence    Ms. Roy is currently in a relationship with a male partner. She has not had sex before but feels that the relationship is heading that way. She had Gardasil vaccine 10 years ago and would like to know if there is anything else she can do to decrease her risk of HPV. She is also wondering about the timeline for getting tested for STIs after initiating sexual activity with her partner.    Ms. Roy is also concerned about an episode of urinary incontinence while running. She has also had several days within the past few months where she feels like she needs to urinate, only to go to the bathroom and not produce much urine.    Gynecologic History  Patient's last menstrual period was 08/14/2017 (exact date).   Menstrual History:  Menstrual History 9/22/2015 9/22/2015 9/5/2017   LAST MENSTRUAL PERIOD 8/30/2015 - 8/14/2017   Menarche age - 10 -   Period Cycle (Days) - 28-32 -   Period Duration (Days) - 3-5 -   Method of Contraception - (No Data) -   Period Pattern - Regular -   Menstrual Flow - Moderate -   Menstrual Control - Tampon;Thin pad;Maxi pad -   Dysmenorrhea - None -   Reviewed Today - Yes -     Current contraception: none, plans to use condoms when she becomes sexually active with her partner  Desires pregnancy within 12 months: N but open to it if it occurs  Number of partners in last year: No previous sexual partners    No results found for: PAP   History of abnormal Pap smear: No  HPV vaccine: Yes (3/3)    Health Maintenance  Immunization History   Administered Date(s) Administered     Tdap (Adacel,Boostrix) 06/15/2008     Health Maintenance   Topic Date Due     PAP SCREENING Q3 YR (SYSTEM ASSIGNED)  07/28/1999     PHQ-9 Q1YR  09/22/2016     INFLUENZA  VACCINE (SYSTEM ASSIGNED)  09/01/2017     TETANUS IMMUNIZATION (SYSTEM ASSIGNED)  06/15/2018     ASHLYN QUESTIONNAIRE 1 YEAR  09/05/2018     Colonoscopy: Not indicated  Mammogram: Not indicated    Past Medical History  Past Medical History:   Diagnosis Date     Concussion 2013     Multiple food allergies        Past Surgical History  Past Surgical History:   Procedure Laterality Date     HC TOOTH EXTRACTION W/FORCEP         Medications  Current Outpatient Prescriptions   Medication     Digestive Enzymes (DIGESTIVE ENZYME PO)     Nutritional Supplements (NUTRITIONAL SUPPLEMENT PO)     UNABLE TO FIND     Omega-3 Fatty Acids (EPA PLUS PO)     Multiple Minerals (CALCIUM/MAGNESIUM/ZINC PO)     Misc Natural Products (DAILY HERBS RELAX/EASE TENSION PO)     MELATONIN PO     No current facility-administered medications for this visit.      Allergies   No Known Allergies    Social History  Social History     Social History     Marital status: Single     Spouse name: N/A     Number of children: N/A     Years of education: N/A     Occupational History           Social History Main Topics     Smoking status: Never Smoker     Smokeless tobacco: Never Used     Alcohol use No     Drug use: No     Sexual activity: Not Currently     Birth control/ protection: Abstinence     Other Topics Concern     Not on file     Social History Narrative    How much exercise per week? Yoga 3-5 x's- [place] ice skating and     How much calcium per day? supplement       How much caffeine per day? 0    How much vitamin D per day? supplement    Do you/your family wear seatbelts?  Yes    Do you/your family use safety helmets? No    Do you/your family use sunscreen? Yes    Do you/your family keep firearms in the home? No    Do you/your family have a smoke detector(s)? Yes        Do you feel safe in your home? Yes    Has anyone ever touched you in an unwanted manner? 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              "   Works as an     Family History  Family History   Problem Relation Age of Onset     Kidney Cancer Maternal Grandmother      DIABETES Maternal Grandmother      Coronary Artery Disease Maternal Grandmother      CEREBROVASCULAR DISEASE Maternal Grandmother      Coronary Artery Disease Maternal Grandfather      DIABETES Maternal Grandfather      Obesity Maternal Grandfather      Breast Cancer Other      Prostate Cancer Other        Review of Systems  See below    OBJECTIVE   /80  Pulse 58  Ht 1.594 m (5' 2.75\")  Wt 62.3 kg (137 lb 6.4 oz)  LMP 08/14/2017 (Exact Date)  BMI 24.53 kg/m2  BMI: Body mass index is 24.53 kg/(m^2).  General:  Alert, no distress, pleasant   HEENT:  Normocephalic, without obvious abnormality  No thyromegaly   Breasts: Within normal limits bilaterally   Abdomen:  Soft, non-tender, non-distended   Pelvic: -nefg  -vagina normal without discharge  -cervix normal in appearance, no masses  -uterus AV, mobile, NT  -no adnexal masses, NT  -anus, perineum wnl   Extremities:  normal       ASSESSMENT   Valencia Roy is a 37 year old G0 here for annual preventive exam within normal limits.    PLAN   (Z00.00) Routine general medical examination at a health care facility  (primary encounter diagnosis)  Patient interested in getting baseline tests for STIs. Would also like screening for vitamin D deficiency as she has been deficient in the past. She also requests CBC and thyroid screening. No interventions necessary at this time for single episode of urinary incontinence or sporadic urinary urgency and frequency. Recommended annual flu vaccine - patient says she will consider this year.  UTD on immunizations.  Plan: Anti Treponema [[id number]], HIV Antigen Antibody         Combo [[id number]], Vitamin D deficiency         screening, CBC with Platelets Differential, TSH        with free T4 reflex         (Z00.00) Visit for preventive health examination    (Z12.4) Screening for malignant " neoplasm of cervix  Plan: Pap Smear Exam [] Do Not Remove, Pelvic         and Breast Exam Procedure [], Pap imaged         thin layer screen with HPV - recommended age 30        - 65 years (select HPV order below), HPV High         Risk Types DNA Cervical         (Z01.419) Encounter for gynecological examination without abnormal finding  Plan: Pap Smear Exam [] Do Not Remove, Pelvic         and Breast Exam Procedure []       RTC in one year for annual exam or with concerns.    Leonie Horne  MS3  P:[phone]    I appreciate the note above by medical student Leonie Horne, MS3. I evaluated and examined the patient with the medical student who acted as scribe for this visit. The documentation recorded by the scribe accurately reflects the services I personally performed and the decisions made by me.  I agree with all history, exam, findings, assessment and plan as documented. Any necessary changes have been made by me.  Kailyn Benavides MD MPH

## 2017-09-05 NOTE — LETTER
9/5/2017     RE: Valencia Roy  [address]   [facility] [address]     Dear Colleague,    Thank you for referring your patient, Valencia Roy, to [facility] Please see a copy of my visit note below.    SUBJECTIVE   Valencia Roy is a 37 year old G0, Patient's last menstrual period was 08/14/2017 (exact date)., here for an annual preventive exam.    Specific concerns today: questions about screening for STIs, 1 episode of urinary incontinence    Ms. Roy is currently in a relationship with a male partner. She has not had sex before but feels that the relationship is heading that way. She had Gardasil vaccine 10 years ago and would like to know if there is anything else she can do to decrease her risk of HPV. She is also wondering about the timeline for getting tested for STIs after initiating sexual activity with her partner.    Ms. Roy is also concerned about an episode of urinary incontinence while running. She has also had several days within the past few months where she feels like she needs to urinate, only to go to the bathroom and not produce much urine.    Gynecologic History  Patient's last menstrual period was 08/14/2017 (exact date).   Menstrual History:  Menstrual History 9/22/2015 9/22/2015 9/5/2017   LAST MENSTRUAL PERIOD 8/30/2015 - 8/14/2017   Menarche age - 10 -   Period Cycle (Days) - 28-32 -   Period Duration (Days) - 3-5 -   Method of Contraception - (No Data) -   Period Pattern - Regular -   Menstrual Flow - Moderate -   Menstrual Control - Tampon;Thin pad;Maxi pad -   Dysmenorrhea - None -   Reviewed Today - Yes -     Current contraception: none, plans to use condoms when she becomes sexually active with her partner  Desires pregnancy within 12 months: N but open to it if it occurs  Number of partners in last year: No previous sexual partners    No results found for: PAP   History of  abnormal Pap smear: No  HPV vaccine: Yes (3/3)    Health Maintenance  Immunization History   Administered Date(s) Administered     Tdap (Adacel,Boostrix) 06/15/2008     Health Maintenance   Topic Date Due     PAP SCREENING Q3 YR (SYSTEM ASSIGNED)  07/28/1999     PHQ-9 Q1YR  09/22/2016     INFLUENZA VACCINE (SYSTEM ASSIGNED)  09/01/2017     TETANUS IMMUNIZATION (SYSTEM ASSIGNED)  06/15/2018     ASHLYN QUESTIONNAIRE 1 YEAR  09/05/2018     Colonoscopy: Not indicated  Mammogram: Not indicated    Past Medical History  Past Medical History:   Diagnosis Date     Concussion 2013     Multiple food allergies        Past Surgical History  Past Surgical History:   Procedure Laterality Date     HC TOOTH EXTRACTION W/FORCEP         Medications  Current Outpatient Prescriptions   Medication     Digestive Enzymes (DIGESTIVE ENZYME PO)     Nutritional Supplements (NUTRITIONAL SUPPLEMENT PO)     UNABLE TO FIND     Omega-3 Fatty Acids (EPA PLUS PO)     Multiple Minerals (CALCIUM/MAGNESIUM/ZINC PO)     Misc Natural Products (DAILY HERBS RELAX/EASE TENSION PO)     MELATONIN PO     No current facility-administered medications for this visit.      Allergies   No Known Allergies    Social History  Social History     Social History     Marital status: Single     Spouse name: N/A     Number of children: N/A     Years of education: N/A     Occupational History           Social History Main Topics     Smoking status: Never Smoker     Smokeless tobacco: Never Used     Alcohol use No     Drug use: No     Sexual activity: Not Currently     Birth control/ protection: Abstinence     Other Topics Concern     Not on file     Social History Narrative    How much exercise per week? Yoga 3-5 x's- [employer] ice skating and     How much calcium per day? supplement       How much caffeine per day? 0    How much vitamin D per day? supplement    Do you/your family wear seatbelts?  Yes    Do you/your family use safety helmets? No    Do you/your  "family use sunscreen? Yes    Do you/your family keep firearms in the home? No    Do you/your family have a smoke detector(s)? Yes        Do you feel safe in your home? Yes    Has anyone ever touched you in an unwanted manner? 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                Works as an     Family History  Family History   Problem Relation Age of Onset     Kidney Cancer Maternal Grandmother      DIABETES Maternal Grandmother      Coronary Artery Disease Maternal Grandmother      CEREBROVASCULAR DISEASE Maternal Grandmother      Coronary Artery Disease Maternal Grandfather      DIABETES Maternal Grandfather      Obesity Maternal Grandfather      Breast Cancer Other      Prostate Cancer Other        Review of Systems  See below    OBJECTIVE   /80  Pulse 58  Ht 1.594 m (5' 2.75\")  Wt 62.3 kg (137 lb 6.4 oz)  LMP 08/14/2017 (Exact Date)  BMI 24.53 kg/m2  BMI: Body mass index is 24.53 kg/(m^2).  General:  Alert, no distress, pleasant   HEENT:  Normocephalic, without obvious abnormality  No thyromegaly   Breasts: Within normal limits bilaterally   Abdomen:  Soft, non-tender, non-distended   Pelvic: -nefg  -vagina normal without discharge  -cervix normal in appearance, no masses  -uterus AV, mobile, NT  -no adnexal masses, NT  -anus, perineum wnl   Extremities:  normal       ASSESSMENT   Valencia Roy is a 37 year old G0 here for annual preventive exam within normal limits.    PLAN   (Z00.00) Routine general medical examination at a health care facility  (primary encounter diagnosis)  Patient interested in getting baseline tests for STIs. Would also like screening for vitamin D deficiency as she has been deficient in the past. She also requests CBC and thyroid screening. No interventions necessary at this time for single episode of urinary incontinence or sporadic urinary urgency and frequency. Recommended annual flu vaccine - patient says she will consider this year.  UTD on " immunizations.  Plan: Anti Treponema [FFY7999], HIV Antigen Antibody         Combo [BYM6604], Vitamin D deficiency         screening, CBC with Platelets Differential, TSH        with free T4 reflex         (Z00.00) Visit for preventive health examination    (Z12.4) Screening for malignant neoplasm of cervix  Plan: Pap Smear Exam [] Do Not Remove, Pelvic         and Breast Exam Procedure [], Pap imaged         thin layer screen with HPV - recommended age 30        - 65 years (select HPV order below), HPV High         Risk Types DNA Cervical         (Z01.419) Encounter for gynecological examination without abnormal finding  Plan: Pap Smear Exam [] Do Not Remove, Pelvic         and Breast Exam Procedure []       RTC in one year for annual exam or with concerns.    Leonie Horne  MS3  P:[phone]    I appreciate the note above by medical student Leonie Horne, MS3. I evaluated and examined the patient with the medical student who acted as scribe for this visit. The documentation recorded by the scribe accurately reflects the services I personally performed and the decisions made by me.  I agree with all history, exam, findings, assessment and plan as documented. Any necessary changes have been made by me.  Kailyn Benavides MD MPH

## 2017-09-06 LAB — T PALLIDUM IGG+IGM SER QL: NEGATIVE

## 2017-09-07 LAB
COPATH REPORT: NORMAL
PAP: NORMAL

## 2017-09-11 LAB
FINAL DIAGNOSIS: NORMAL
HPV HR 12 DNA CVX QL NAA+PROBE: NEGATIVE
HPV16 DNA SPEC QL NAA+PROBE: NEGATIVE
HPV18 DNA SPEC QL NAA+PROBE: NEGATIVE
SPECIMEN DESCRIPTION: NORMAL

## 2017-09-26 ENCOUNTER — THERAPY VISIT (OUTPATIENT)
Dept: CHIROPRACTIC MEDICINE | Facility: CLINIC | Age: 37
End: 2017-09-26
Payer: COMMERCIAL

## 2017-09-26 DIAGNOSIS — M79.10 MYALGIA: ICD-10-CM

## 2017-09-26 DIAGNOSIS — G89.29 CHRONIC LEFT-SIDED LOW BACK PAIN WITHOUT SCIATICA: ICD-10-CM

## 2017-09-26 DIAGNOSIS — M54.50 CHRONIC LEFT-SIDED LOW BACK PAIN WITHOUT SCIATICA: ICD-10-CM

## 2017-09-26 DIAGNOSIS — M99.05 SOMATIC DYSFUNCTION OF PELVIS REGION: Primary | ICD-10-CM

## 2017-09-26 PROCEDURE — 97110 THERAPEUTIC EXERCISES: CPT | Performed by: CHIROPRACTOR

## 2017-09-26 PROCEDURE — 98940 CHIROPRACT MANJ 1-2 REGIONS: CPT | Mod: AT | Performed by: CHIROPRACTOR

## 2017-09-26 NOTE — MR AVS SNAPSHOT
After Visit Summary   9/26/2017    Valencia Roy    MRN: [id number]           Patient Information     Date Of Birth          1980        Visit Information        Provider Department      9/26/2017 6:45 PM Atif Holliday DC [facility] [place] [facility]        Today's Diagnoses     Somatic dysfunction of pelvis region    -  1    Chronic left-sided low back pain without sciatica        Myalgia           Follow-ups after your visit        Who to contact     If you have questions or need follow up information about today's clinic visit or your schedule please contact [place] [facility] directly at [phone].  Normal or non-critical lab and imaging results will be communicated to you by Media Armorhart, letter or phone within 4 business days after the clinic has received the results. If you do not hear from us within 7 days, please contact the clinic through Media Armorhart or phone. If you have a critical or abnormal lab result, we will notify you by phone as soon as possible.  Submit refill requests through [email or web address] or call your pharmacy and they will forward the refill request to us. Please allow 3 business days for your refill to be completed.          Additional Information About Your Visit        MyChart Information     b5media gives you secure access to your electronic health record. If you see a primary care provider, you can also send messages to your care team and make appointments. If you have questions, please call your primary care clinic.  If you do not have a primary care provider, please call [phone] and they will assist you.        Care EveryWhere ID     This is your Care EveryWhere ID. This could be used by other organizations to access your [facility] medical records  [id number]        Your Vitals Were     Last Period                   08/14/2017 (Exact Date)            Blood Pressure from Last 3 Encounters:    09/05/17 123/80   12/15/15 129/72   09/22/15 124/74    Weight from Last 3 Encounters:   09/05/17 62.3 kg (137 lb 6.4 oz)   12/15/15 62.6 kg (138 lb)   09/22/15 63 kg (139 lb)              We Performed the Following     CHIROPRAC MANIP,SPINAL,1-2 REGIONS     THERAPEUTIC EXERCISES        Primary Care Provider    None Specified       No primary provider on file.        Equal Access to Services     RENY LINDSEY : Hadii aad ku hadasho Soomaali, waaxda luqadaha, qaybta kaalmada adeegyada, kym dixon . So [facility] [phone].    ATENCIÓN: Si raven mitchell, tiene a martínez disposición servicios gratuitos de asistencia lingüística. Llame al [phone].    We comply with applicable federal civil rights laws and [place] laws. We do not discriminate on the basis of race, color, national origin, age, disability sex, sexual orientation or gender identity.            Thank you!     Thank you for choosing [place] [facility]  for your care. Our goal is always to provide you with excellent care. Hearing back from our patients is one way we can continue to improve our services. Please take a few minutes to complete the written survey that you may receive in the mail after your visit with us. Thank you!             Your Updated Medication List - Protect others around you: Learn how to safely use, store and throw away your medicines at www.disposemymeds.org.          This list is accurate as of: 9/26/17  6:51 PM.  Always use your most recent med list.                   Brand Name Dispense Instructions for use Diagnosis    CALCIUM/MAGNESIUM/ZINC PO      Vitamin D Metabolic Maintenance        DAILY HERBS RELAX/EASE TENSION PO           DIGESTIVE ENZYME PO           EPA PLUS PO           MELATONIN PO           NUTRITIONAL SUPPLEMENT PO      Liver GI Detox        UNABLE TO FIND      EstroDim

## 2017-09-26 NOTE — PROGRESS NOTES
[facility]      Subjective:  Valencia Roy   37 year old   female  Denies any changes in medications   CC:  general lower back L>R, L posterior hip   Visit: 18  Goal: run over 60 minutes without hip and back pain  Location:   lower back pain L>R  Comes in today doing well. Notes tolerated last session well. Notes mild improvement. States working on active care program. Still working with PT as well.   Email prior to visit:  Hi!  Just wanted to let you know I can't believe how much better my back/butt/hip feels. Still some tightness but there is such a huge improvement!!! Can't wait for tomorrow!!!    And my foot has been good. I ran trails out at [place] a week or so ago and my arch was a little crampy but otherwise foot was good. Yay!!      Objective:  Inspection:  No SDD  No Scars  Normal Gait    Palpation:  No specific pain  Palpable soreness over left posterior hip, general lower back, L QL,   Myofascitis 2/4 noted over  L hip ER, L QL, LPS    ROM:  Lumbar flexion   90/90, tightness lower back  Lumbar extension  30/30, mild lower back discomfort   Right Hip IR  45/45  Left Hip IR  34/45  Right Hip ER  53/60  Left  hip ER  58/60  Ankle DF limited on L in talar neutral     MMT:  Left glute med 4/5 with no pain  Right glute med 5/5 with no pain  Left TFL 5/5 with lateral hip discomfort   Right TFL 5/5 with no pain    MET:  Left short leg    Squat:  Shift to left  Foot flare to right  Arm drop on left  B foot collapse     Lunge:  Left knee genu valgum  Leftt knee cross over     NAL:  Restricted SI on left side  Restricted talus L    Kemps (left lower back discomfort), SLR (-)  Able to toe walk and heel walk      Assessment:  NAL with associated myofascitis and weakness  Posterior tibialis dysfunction   Mild ankle sprain    Plan:  Patient tolerated treatment well today  Treatment Time: 45 minutes   11866 manipulation 1-2 segments: MET, Hip LAD  71017 manipulation: Manual extremity  40799  Manual therapy: (ART, Graston, Strain Counter Strain, Fascial Manipulation, Cupping) performed over area of L hip ER, L QL  68883 therapeutic exercise (20 minutes):  Self ankle mulligan, short foot lateral band walks, toe yoga, single leg balance on bosu ball, single leg hops on bosu ball, single leg hops and land off bosu ball, walking lunges with weight, single leg ball toss on trampoline, short foot activation exercises, alter G running: to be scheduled in [place], review of current PT exercises she is doing for back and ankle, made corrections as needed, standing hip hikes, bird dogs on stability ball, Mgill sit ups, clams shells, side lying hip rotation, standing QL stretch, single leg balance    Today: 4 way hip IR (will do 2 of them every other day)  Strapping: 3 way hip mobility   Next visit: 3 weeks  Dry Needling:  Lower back L2-L5, left posterior hip      Atif Holliday DC, MEd, ATC

## 2017-10-19 ENCOUNTER — THERAPY VISIT (OUTPATIENT)
Dept: CHIROPRACTIC MEDICINE | Facility: CLINIC | Age: 37
End: 2017-10-19
Payer: COMMERCIAL

## 2017-10-19 DIAGNOSIS — G89.29 CHRONIC LEFT-SIDED LOW BACK PAIN WITHOUT SCIATICA: ICD-10-CM

## 2017-10-19 DIAGNOSIS — M79.10 MYALGIA: ICD-10-CM

## 2017-10-19 DIAGNOSIS — M54.50 CHRONIC LEFT-SIDED LOW BACK PAIN WITHOUT SCIATICA: ICD-10-CM

## 2017-10-19 DIAGNOSIS — M99.05 SOMATIC DYSFUNCTION OF PELVIS REGION: Primary | ICD-10-CM

## 2017-10-19 PROCEDURE — 97110 THERAPEUTIC EXERCISES: CPT | Performed by: CHIROPRACTOR

## 2017-10-19 PROCEDURE — 98940 CHIROPRACT MANJ 1-2 REGIONS: CPT | Mod: AT | Performed by: CHIROPRACTOR

## 2017-10-19 NOTE — PROGRESS NOTES
[facility]      Subjective:  Valencia Roy   37 year old   female  Denies any changes in medications   CC:  general lower back L>R, L posterior hip   Visit: 19  Goal: run over 60 minutes without hip and back pain  Location:   lower back pain L>R  Comes in today doing well. Notes tolerated last session well. Notes mild improvement. States working on active care program. Still working with PT as well.   Comes in today doing OK. Notes not as good last time. Notes 2 weeks ago went to [employer]. Leg pain is doing well. Notes working on left posterior hip capsule was most helpful and the needling. Denies any new issues. Notes running still and that is going well. Ankle is doing well. Denies any new issues.       Objective:  Inspection:  No SDD  No Scars  Normal Gait    Palpation:  No specific pain  Palpable soreness over left posterior hip, general lower back, L QL,   Myofascitis 2/4 noted over  L hip ER, L QL, LPS    ROM:  Lumbar flexion   90/90, tightness lower back  Lumbar extension  30/30, mild lower back discomfort   Right Hip IR  45/45  Left Hip IR  34/45  Right Hip ER  53/60  Left  hip ER  58/60  Ankle DF limited on L in talar neutral     MMT:  Left glute med 4/5 with no pain  Right glute med 5/5 with no pain  Left TFL 5/5 with lateral hip discomfort   Right TFL 5/5 with no pain    MET:  Left short leg    Squat:  Shift to left  Foot flare to right  Arm drop on left  B foot collapse     Lunge:  Left knee genu valgum  Leftt knee cross over     NAL:  Restricted SI on left side  Restricted talus L    Kemps (left lower back discomfort), SLR (-)  Able to toe walk and heel walk      Assessment:  NAL with associated myofascitis and weakness  Posterior tibialis dysfunction   Mild ankle sprain    Plan:  Patient tolerated treatment well today  Treatment Time: 45 minutes   36964 manipulation 1-2 segments: MET, Hip LAD  51231 manipulation: Manual extremity  58244 Manual therapy: (ART, Sangeetha, Strain  Counter Strain, Fascial Manipulation, Cupping) performed over area of L hip ER, L QL  29685 therapeutic exercise (20 minutes):  Self ankle mulligan, short foot lateral band walks, toe yoga, single leg balance on bosu ball, single leg hops on bosu ball, single leg hops and land off bosu ball, walking lunges with weight, single leg ball toss on trampoline, short foot activation exercises, alter G running: to be scheduled in [place], review of current PT exercises she is doing for back and ankle, made corrections as needed, standing hip hikes, bird dogs on stability ball, Mgill sit ups, clams shells, side lying hip rotation, standing QL stretch, single leg balance    Today: 4 way hip IR (will do 2 of them every other day)  Strapping: 3 way hip mobility   Next visit: 3 weeks  Dry Needling:  Lower back L2-L5, left posterior hip      Atif Holliday DC, MEd, ATC

## 2017-10-19 NOTE — MR AVS SNAPSHOT
After Visit Summary   10/19/2017    Valencia Roy    MRN: [id number]           Patient Information     Date Of Birth          1980        Visit Information        Provider Department      10/19/2017 5:45 PM Atif Holliday DC [facility] [place] Chiropractor        Today's Diagnoses     Somatic dysfunction of pelvis region    -  1    Myalgia        Chronic left-sided low back pain without sciatica           Follow-ups after your visit        Your next 10 appointments already scheduled     Oct 19, 2017  5:45 PM CDT   [facility] Chiropractor with Atif Holliday DC   [facility] [place] Chiropractor ([facility] [place])    [address] [place] [address]  [address]  [place] [address] [phone]              Who to contact     If you have questions or need follow up information about today's clinic visit or your schedule please contact [facility] directly at [phone].  Normal or non-critical lab and imaging results will be communicated to you by PublikDemandhart, letter or phone within 4 business days after the clinic has received the results. If you do not hear from us within 7 days, please contact the clinic through Cloud Floort or phone. If you have a critical or abnormal lab result, we will notify you by phone as soon as possible.  Submit refill requests through Servoyant or call your pharmacy and they will forward the refill request to us. Please allow 3 business days for your refill to be completed.          Additional Information About Your Visit        PublikDemandhart Information     Servoyant gives you secure access to your electronic health record. If you see a primary care provider, you can also send messages to your care team and make appointments. If you have questions, please call your primary care clinic.  If you do not have a primary care provider, please call [phone] and they will assist you.         Care EveryWhere ID     This is your Care EveryWhere ID. This could be used by other organizations to access your [facility] medical records  [id number]         Blood Pressure from Last 3 Encounters:   09/05/17 123/80   12/15/15 129/72   09/22/15 124/74    Weight from Last 3 Encounters:   09/05/17 62.3 kg (137 lb 6.4 oz)   12/15/15 62.6 kg (138 lb)   09/22/15 63 kg (139 lb)              We Performed the Following     CHIROPRAC MANIP,SPINAL,1-2 REGIONS     THERAPEUTIC EXERCISES        Primary Care Provider Office Phone # Fax #    [facility] [phone]       [address] [facility] [address]        Equal Access to Services     RENY LINDSEY : Hadii aad ku hadasho Soomaali, waaxda luqadaha, qaybta kaalmada adeegyada, kym mc. So [facility] [phone].    ATENCIÓN: Si habla español, tiene a martínez disposición servicios gratuitos de asistencia lingüística. Llame al [phone].    We comply with applicable federal civil rights laws and [place] laws. We do not discriminate on the basis of race, color, national origin, age, disability, sex, sexual orientation, or gender identity.            Thank you!     Thank you for choosing [facility]  for your care. Our goal is always to provide you with excellent care. Hearing back from our patients is one way we can continue to improve our services. Please take a few minutes to complete the written survey that you may receive in the mail after your visit with us. Thank you!             Your Updated Medication List - Protect others around you: Learn how to safely use, store and throw away your medicines at www.disposemymeds.org.          This list is accurate as of: 10/19/17  5:41 PM.  Always use your most recent med list.                   Brand Name Dispense Instructions for use Diagnosis    CALCIUM/MAGNESIUM/ZINC PO      Vitamin D Metabolic Maintenance         DAILY HERBS RELAX/EASE TENSION PO           DIGESTIVE ENZYME PO           EPA PLUS PO           MELATONIN PO           NUTRITIONAL SUPPLEMENT PO      Liver GI Detox        UNABLE TO FIND      EstroDim

## 2017-11-07 ENCOUNTER — THERAPY VISIT (OUTPATIENT)
Dept: CHIROPRACTIC MEDICINE | Facility: CLINIC | Age: 37
End: 2017-11-07
Payer: COMMERCIAL

## 2017-11-07 DIAGNOSIS — M79.10 MYALGIA: ICD-10-CM

## 2017-11-07 DIAGNOSIS — G89.29 CHRONIC LEFT-SIDED LOW BACK PAIN WITHOUT SCIATICA: ICD-10-CM

## 2017-11-07 DIAGNOSIS — M54.50 CHRONIC LEFT-SIDED LOW BACK PAIN WITHOUT SCIATICA: ICD-10-CM

## 2017-11-07 DIAGNOSIS — M99.05 SOMATIC DYSFUNCTION OF PELVIS REGION: Primary | ICD-10-CM

## 2017-11-07 PROCEDURE — 97110 THERAPEUTIC EXERCISES: CPT | Performed by: CHIROPRACTOR

## 2017-11-07 PROCEDURE — 98940 CHIROPRACT MANJ 1-2 REGIONS: CPT | Mod: AT | Performed by: CHIROPRACTOR

## 2017-11-07 NOTE — MR AVS SNAPSHOT
After Visit Summary   11/7/2017    Valencia Roy    MRN: [id number]           Patient Information     Date Of Birth          1980        Visit Information        Provider Department      11/7/2017 5:45 PM Atif Holliday DC [place] [facility] - [facility]rop[facility]        Today's Diagnoses     Somatic dysfunction of pelvis region    -  1    Myalgia        Chronic left-sided low back pain without sciatica           Follow-ups after your visit        Who to contact     If you have questions or need follow up information about today's clinic visit or your schedule please contact [place] [facility] directly at [phone].  Normal or non-critical lab and imaging results will be communicated to you by Brandcasthart, letter or phone within 4 business days after the clinic has received the results. If you do not hear from us within 7 days, please contact the clinic through Brandcasthart or phone. If you have a critical or abnormal lab result, we will notify you by phone as soon as possible.  Submit refill requests through Ziipa or call your pharmacy and they will forward the refill request to us. Please allow 3 business days for your refill to be completed.          Additional Information About Your Visit        MyChart Information     Ziipa gives you secure access to your electronic health record. If you see a primary care provider, you can also send messages to your care team and make appointments. If you have questions, please call your primary care clinic.  If you do not have a primary care provider, please call [phone] and they will assist you.        Care EveryWhere ID     This is your Care EveryWhere ID. This could be used by other organizations to access your [place] medical records  [id number]         Blood Pressure from Last 3 Encounters:   09/05/17 123/80   12/15/15 129/72   09/22/15 124/74    Weight from Last 3 Encounters:    09/05/17 62.3 kg (137 lb 6.4 oz)   12/15/15 62.6 kg (138 lb)   09/22/15 63 kg (139 lb)              We Performed the Following     CHIROPRAC MANIP,SPINAL,1-2 REGIONS     THERAPEUTIC EXERCISES        Primary Care Provider Office Phone # Fax #    [facility] [phone]       [address] [facility] [address]        Equal Access to Services     RENY LINDSEY : Hadii aad ku hadasho Soomaali, waaxda luqadaha, qaybta kaalmada adeegyada, waxay idiin hayaan adeeg kharash la'aan ah. So [facility] [place] [phone].    ATENCIÓN: Si habla español, tiene a martínez disposición servicios gratuitos de asistencia lingüística. [facility] [phone].    We comply with applicable federal civil rights laws and [place] laws. We do not discriminate on the basis of race, color, national origin, age, disability, sex, sexual orientation, or gender identity.            Thank you!     Thank you for choosing [facility]  for your care. Our goal is always to provide you with excellent care. Hearing back from our patients is one way we can continue to improve our services. Please take a few minutes to complete the written survey that you may receive in the mail after your visit with us. Thank you!             Your Updated Medication List - Protect others around you: Learn how to safely use, store and throw away your medicines at www.disposemymeds.org.          This list is accurate as of: 11/7/17 11:59 PM.  Always use your most recent med list.                   Brand Name Dispense Instructions for use Diagnosis    CALCIUM/MAGNESIUM/ZINC PO      Vitamin D Metabolic Maintenance        DAILY HERBS RELAX/EASE TENSION PO           DIGESTIVE ENZYME PO           EPA PLUS PO           MELATONIN PO           NUTRITIONAL SUPPLEMENT PO      Liver GI Detox        UNABLE TO FIND      EstroDim

## 2017-11-11 NOTE — PROGRESS NOTES
[place] [facility]      Subjective:  Valencia Roy   37 year old   female  Denies any changes in medications   CC:  general lower back L>R, L posterior hip   Visit: 20  Goal: run over 60 minutes without hip and back pain  Location:   lower back pain L>R  Comes in today doing well. No no leg pain. Foot is doing well. Left hip still sore. Her PT came in today to observe so we can work together. She notes her activity is very high and she is pleased with that. She denies any new issues. She continues to see PT once per week and work on active care program from me. Most pain is sitting for long periods of time.     Objective:  Inspection:  No SDD  No Scars  Normal Gait    Palpation:  No specific pain  Palpable soreness over left posterior hip, general lower back, L QL,   Myofascitis 2/4 noted over  L hip ER, L QL, LPS    ROM:  Lumbar flexion   90/90, tightness lower back  Lumbar extension  30/30, mild lower back discomfort   Right Hip IR  45/45  Left Hip IR  34/45  Right Hip ER  53/60  Left  hip ER  58/60  Ankle DF limited on L in talar neutral     MMT:  Left glute med 4/5 with no pain  Right glute med 5/5 with no pain  Left TFL 5/5 with lateral hip discomfort   Right TFL 5/5 with no pain    MET:  Left short leg    Squat:  Shift to left  Foot flare to right  Arm drop on left  B foot collapse     Lunge:  Left knee genu valgum  Leftt knee cross over     NAL:  Restricted SI on left side  Restricted talus L    Kemps (left lower back discomfort), SLR (-)  Able to toe walk and heel walk      Assessment:  NAL with associated myofascitis and weakness  Posterior tibialis dysfunction   Mild ankle sprain    Plan:  Patient tolerated treatment well today  Treatment Time: 45 minutes   41356 manipulation 1-2 segments: MET, Hip LAD  26652 manipulation: Manual extremity  84102 Manual therapy: (ART, Graston, Strain Counter Strain, Fascial Manipulation, Cupping) performed over area of L hip ER, L QL  44442 therapeutic  exercise (20 minutes):  Self ankle mulligan, short foot lateral band walks, toe yoga, single leg balance on bosu ball, single leg hops on bosu ball, single leg hops and land off bosu ball, walking lunges with weight, single leg ball toss on trampoline, short foot activation exercises, alter G running: to be scheduled in [place], review of current PT exercises she is doing for back and ankle, made corrections as needed, standing hip hikes, bird dogs on stability ball, Mgill sit ups, clams shells, side lying hip rotation, standing QL stretch, single leg balance    Today: 4 way hip IR (will do 2 of them every other day)  Strapping: 3 way hip mobility   Next visit: 3 weeks  Dry Needling:  Lower back L2-L5, left posterior hip, L QL  Today worked left lateral line.       Atif Holliday DC, MEd, ATC

## 2017-12-07 ENCOUNTER — THERAPY VISIT (OUTPATIENT)
Dept: CHIROPRACTIC MEDICINE | Facility: CLINIC | Age: 37
End: 2017-12-07
Payer: COMMERCIAL

## 2017-12-07 DIAGNOSIS — M79.10 MYALGIA: ICD-10-CM

## 2017-12-07 DIAGNOSIS — G89.29 CHRONIC LEFT-SIDED LOW BACK PAIN WITHOUT SCIATICA: ICD-10-CM

## 2017-12-07 DIAGNOSIS — M54.50 CHRONIC LEFT-SIDED LOW BACK PAIN WITHOUT SCIATICA: ICD-10-CM

## 2017-12-07 DIAGNOSIS — M99.05 SOMATIC DYSFUNCTION OF PELVIS REGION: Primary | ICD-10-CM

## 2017-12-07 PROCEDURE — 97110 THERAPEUTIC EXERCISES: CPT | Performed by: CHIROPRACTOR

## 2017-12-07 PROCEDURE — 98940 CHIROPRACT MANJ 1-2 REGIONS: CPT | Mod: AT | Performed by: CHIROPRACTOR

## 2017-12-07 NOTE — MR AVS SNAPSHOT
After Visit Summary   12/7/2017    Valencia Roy    MRN: [id number]           Patient Information     Date Of Birth          1980        Visit Information        Provider Department      12/7/2017 6:00 PM Atif Holliday DC [place] [facility] - [facility] [place] Chiropractor        Today's Diagnoses     Somatic dysfunction of pelvis region    -  1    Myalgia        Chronic left-sided low back pain without sciatica           Follow-ups after your visit        Who to contact     If you have questions or need follow up information about today's clinic visit or your schedule please contact [place] [facility] directly at [phone].  Normal or non-critical lab and imaging results will be communicated to you by Money Toolkithart, letter or phone within 4 business days after the clinic has received the results. If you do not hear from us within 7 days, please contact the clinic through Money Toolkithart or phone. If you have a critical or abnormal lab result, we will notify you by phone as soon as possible.  Submit refill requests through Lot[employer] Cars or call your pharmacy and they will forward the refill request to us. Please allow 3 business days for your refill to be completed.          Additional Information About Your Visit        MyChart Information     [employer] gives you secure access to your electronic health record. If you see a primary care provider, you can also send messages to your care team and make appointments. If you have questions, please call your primary care clinic.  If you do not have a primary care provider, please call [phone] and they will assist you.        Care EveryWhere ID     This is your Care EveryWhere ID. This could be used by other organizations to access your [place] medical records  [id number]         Blood Pressure from Last 3 Encounters:   09/05/17 123/80   12/15/15 129/72   09/22/15 124/74    Weight from Last 3 Encounters:    09/05/17 62.3 kg (137 lb 6.4 oz)   12/15/15 62.6 kg (138 lb)   09/22/15 63 kg (139 lb)              We Performed the Following     CHIROPRAC MANIP,SPINAL,1-2 REGIONS     THERAPEUTIC EXERCISES        Primary Care Provider Office Phone # Fax #    Chris [facility] [phone]       [address] [facility] [address]        Equal Access to Services     RENY LINDSEY : Hadii aad ku hadasho Soomaali, waaxda luqadaha, qaybta kaalmada adeegyada, waxay idiin hayaan adeeg kharash la'aan ah. So [facility] [phone].    ATENCIÓN: Si habla español, tiene a martínez disposición servicios gratuitos de asistencia lingüística. [facility] [phone].    We comply with applicable federal civil rights laws and [place] laws. We do not discriminate on the basis of race, color, national origin, age, disability, sex, sexual orientation, or gender identity.            Thank you!     Thank you for choosing [facility]  for your care. Our goal is always to provide you with excellent care. Hearing back from our patients is one way we can continue to improve our services. Please take a few minutes to complete the written survey that you may receive in the mail after your visit with us. Thank you!             Your Updated Medication List - Protect others around you: Learn how to safely use, store and throw away your medicines at www.disposemymeds.org.          This list is accurate as of: 12/7/17  8:54 PM.  Always use your most recent med list.                   Brand Name Dispense Instructions for use Diagnosis    CALCIUM/MAGNESIUM/ZINC PO      Vitamin D Metabolic Maintenance        DAILY HERBS RELAX/EASE TENSION PO           DIGESTIVE ENZYME PO           EPA PLUS PO           MELATONIN PO           NUTRITIONAL SUPPLEMENT PO      Liver GI Detox        UNABLE TO FIND      EstroDim

## 2017-12-08 NOTE — PROGRESS NOTES
[place] [facility]      Subjective:  Valencia Roy   37 year old   female  Denies any changes in medications   CC:  general lower back L>R, L posterior hip   Visit: 21  Goal: run over 60 minutes without hip and back pain  Location:   lower back pain L>R  Comes in today doing well. Stated had 2 lays of leg pain last week for no reason. Notes she was standing for long period day before and work up with left anterior and lateral thigh pain. Nothing below knee. Notes that after 2 days felt better. Still working with PT. Notes most pain is over left lower back on side by hip. Notes left arch is tight. Denies any new issues. She is working on active care program. Notes that distraction helps leg.     Objective:  Inspection:  No SDD  No Scars  Normal Gait    Palpation:  No specific pain  Palpable soreness over left posterior hip, general lower back, L QL,   Myofascitis 2/4 noted over  L hip ER, L QL, LPS    ROM:  Lumbar flexion   90/90, tightness lower back  Lumbar extension  30/30, mild lower back discomfort   Right Hip IR  45/45  Left Hip IR  34/45  Right Hip ER  53/60  Left  hip ER  58/60  Ankle DF limited on L in talar neutral     MMT:  Left glute med 4/5 with no pain  Right glute med 5/5 with no pain  Left TFL 5/5 with lateral hip discomfort   Right TFL 5/5 with no pain    MET:  Left short leg    Squat:  Shift to left  Foot flare to right  Arm drop on left  B foot collapse     Lunge:  Left knee genu valgum  Leftt knee cross over     NAL:  Restricted SI on left side  Restricted talus L    Kemps (left lower back discomfort), SLR (-)  Able to toe walk and heel walk      Assessment:  NAL with associated myofascitis and weakness  Posterior tibialis dysfunction   Mild ankle sprain    Plan:  Patient tolerated treatment well today  Treatment Time: 45 minutes   52882 manipulation 1-2 segments: MET, Hip LAD  59223 manipulation: Manual extremity  16082 Manual therapy: (ART, Graston, Strain Counter Strain,  Fascial Manipulation, Cupping) performed over area of L hip ER, L QL  91217 therapeutic exercise (20 minutes):  Self ankle mulligan, short foot lateral band walks, toe yoga, single leg balance on bosu ball, single leg hops on bosu ball, single leg hops and land off bosu ball, walking lunges with weight, single leg ball toss on trampoline, short foot activation exercises, alter G running: to be scheduled in [place], review of current PT exercises she is doing for back and ankle, made corrections as needed, standing hip hikes, bird dogs on stability ball, Mgill sit ups, clams shells, side lying hip rotation, standing QL stretch, single leg balance    Today: 4 way hip IR (will do 2 of them every other day), self hip mobility and traction with green rouge band  Strapping: 3 way hip mobility   Next visit: 3 weeks  Dry Needling:  Side lying, left hip and QL  Today worked left lateral line.       Atif Holliday DC, MEd, ATC

## 2018-01-04 ENCOUNTER — THERAPY VISIT (OUTPATIENT)
Dept: CHIROPRACTIC MEDICINE | Facility: CLINIC | Age: 38
End: 2018-01-04
Payer: COMMERCIAL

## 2018-01-04 DIAGNOSIS — M79.10 MYALGIA: ICD-10-CM

## 2018-01-04 DIAGNOSIS — G89.29 CHRONIC LEFT-SIDED LOW BACK PAIN WITHOUT SCIATICA: ICD-10-CM

## 2018-01-04 DIAGNOSIS — M76.829 POSTERIOR TIBIALIS MUSCLE DYSFUNCTION: Primary | ICD-10-CM

## 2018-01-04 DIAGNOSIS — M99.05 SOMATIC DYSFUNCTION OF PELVIS REGION: ICD-10-CM

## 2018-01-04 DIAGNOSIS — M25.572 PAIN IN JOINT, ANKLE AND FOOT, LEFT: ICD-10-CM

## 2018-01-04 DIAGNOSIS — M54.50 CHRONIC LEFT-SIDED LOW BACK PAIN WITHOUT SCIATICA: ICD-10-CM

## 2018-01-04 PROCEDURE — 97110 THERAPEUTIC EXERCISES: CPT | Performed by: CHIROPRACTOR

## 2018-01-04 PROCEDURE — 98940 CHIROPRACT MANJ 1-2 REGIONS: CPT | Mod: AT | Performed by: CHIROPRACTOR

## 2018-01-04 NOTE — MR AVS SNAPSHOT
After Visit Summary   1/4/2018    Valencia Roy    MRN: [id number]           Patient Information     Date Of Birth          1980        Visit Information        Provider Department      1/4/2018 6:00 PM Atif Holliday DC [place] [facility] - [facility] [place] Chiropractor        Today's Diagnoses     Posterior tibialis muscle dysfunction    -  1    Somatic dysfunction of pelvis region        Myalgia        Chronic left-sided low back pain without sciatica        Pain in joint, ankle and foot, left           Follow-ups after your visit        Who to contact     If you have questions or need follow up information about today's clinic visit or your schedule please contact [facility] directly at [phone].  Normal or non-critical lab and imaging results will be communicated to you by Archipelagohart, letter or phone within 4 business days after the clinic has received the results. If you do not hear from us within 7 days, please contact the clinic through Archipelagohart or phone. If you have a critical or abnormal lab result, we will notify you by phone as soon as possible.  Submit refill requests through [employer] or call your pharmacy and they will forward the refill request to us. Please allow 3 business days for your refill to be completed.          Additional Information About Your Visit        MyChart Information     [employer] gives you secure access to your electronic health record. If you see a primary care provider, you can also send messages to your care team and make appointments. If you have questions, please call your primary care clinic.  If you do not have a primary care provider, please call [phone] and they will assist you.        Care EveryWhere ID     This is your Care EveryWhere ID. This could be used by other organizations to access your [place] medical records  [id number]         Blood Pressure from Last 3 Encounters:    09/05/17 123/80   12/15/15 129/72   09/22/15 124/74    Weight from Last 3 Encounters:   09/05/17 62.3 kg (137 lb 6.4 oz)   12/15/15 62.6 kg (138 lb)   09/22/15 63 kg (139 lb)              We Performed the Following     CHIROPRAC MANIP,SPINAL,1-2 REGIONS     THERAPEUTIC EXERCISES        Primary Care Provider Office Phone # Fax #    Chris [facility] [phone]       [address] [facility] [address]        Equal Access to Services     RENY LINDSEY : Hadii aad ku hadasho Soomaali, waaxda luqadaha, qaybta kaalmada adeegyada, kym dixon . So [facility] [phone].    ATENCIÓN: Si habla español, tiene a martínez disposición servicios gratuitos de asistencia lingüística. Llame al [phone].    We comply with applicable federal civil rights laws and [place] laws. We do not discriminate on the basis of race, color, national origin, age, disability, sex, sexual orientation, or gender identity.            Thank you!     Thank you for choosing [facility]  for your care. Our goal is always to provide you with excellent care. Hearing back from our patients is one way we can continue to improve our services. Please take a few minutes to complete the written survey that you may receive in the mail after your visit with us. Thank you!             Your Updated Medication List - Protect others around you: Learn how to safely use, store and throw away your medicines at www.disposemymeds.org.          This list is accurate as of: 1/4/18  8:31 PM.  Always use your most recent med list.                   Brand Name Dispense Instructions for use Diagnosis    CALCIUM/MAGNESIUM/ZINC PO      Vitamin D Metabolic Maintenance        DAILY HERBS RELAX/EASE TENSION PO           DIGESTIVE ENZYME PO           EPA PLUS PO           MELATONIN PO           NUTRITIONAL SUPPLEMENT PO      Liver GI Detox        UNABLE TO FIND      EstroDim

## 2018-01-05 NOTE — PROGRESS NOTES
[place] [facility]      Subjective:  Valencia Roy   37 year old   female  Denies any changes in medications   CC:  general lower back L>R, L posterior hip, L posterior tibialis   Visit: 22  Goal: run over 60 minutes without hip and back pain  Location:   lower back pain L>R, L medial lower shin  Comes in today doing well. Has had appoint with DO and PT today for lower back. Asked if I could focus on posterior tibialis. Notes ran without lift and notes some soreness distal medial shin in same location for previous treatments. Denies any swelling. Still running full go. Denies any new issues. Wants to be safe and not get any episodes again. We spent time discussing plan of making sure she is continuing her active care and continues wearing insert Dr. Valdovinos made. She agreed. Does note that back is doing better from our last session. Denies any new changes in health history.      Objective:  Inspection:  No SDD  No Scars  Normal Gait    Palpation:  No specific pain  Palpable soreness over left posterior hip, general lower back, L QL, L lower shin  Myofascitis 2/4 noted over  L hip ER, L QL, LPS, L posterior tibialis     ROM:  Lumbar flexion   90/90, tightness lower back  Lumbar extension  30/30, mild lower back discomfort   Right Hip IR  45/45  Left Hip IR  34/45  Right Hip ER  53/60  Left  hip ER  58/60  Ankle DF limited on L in talar neutral     MMT:  Left glute med 4/5 with no pain  Right glute med 5/5 with no pain  Left TFL 5/5 with lateral hip discomfort   Right TFL 5/5 with no pain    MET:  Left short leg    Squat:  Shift to left  Foot flare to right  Arm drop on left  B foot collapse     Lunge:  Left knee genu valgum  Leftt knee cross over     NAL:  Restricted SI on left side  Restricted talus L    Kemps (left lower back discomfort), SLR (-)  Able to toe walk and heel walk      Assessment:  NAL with associated myofascitis and weakness  Posterior tibialis dysfunction     Plan:  Patient tolerated  treatment well today  Treatment Time: 45 minutes   70363 manipulation 1-2 segments: MET, Hip LAD  90299 manipulation: Manual extremity  39253 Manual therapy: (ART, Graston, Strain Counter Strain, Fascial Manipulation, Cupping) performed over area of L hip ER, L QL, L gastroc, L posterior tibialis, L plantar   42523 therapeutic exercise (20 minutes):  Self ankle mulligan, short foot lateral band walks, toe yoga, single leg balance on bosu ball, single leg hops on bosu ball, single leg hops and land off bosu ball, walking lunges with weight, single leg ball toss on trampoline, short foot activation exercises, alter G running: to be scheduled in [place], review of current PT exercises she is doing for back and ankle, made corrections as needed, standing hip hikes, bird dogs on stability ball, Mgill sit ups, clams shells, side lying hip rotation, standing QL stretch, single leg balance    Today: 4 way hip IR (will do 2 of them every other day), self hip mobility and traction with green rouge band, short foot activation   Strapping: ankle mulligan   Next visit: 3 weeks  Dry Needling:  L calf  Today focused on calf and ankle   Shock wave: 15/3 over posterior tibialis       Atif Holliday DC, MEd, ATC

## 2018-02-01 ENCOUNTER — THERAPY VISIT (OUTPATIENT)
Dept: CHIROPRACTIC MEDICINE | Facility: CLINIC | Age: 38
End: 2018-02-01
Payer: COMMERCIAL

## 2018-02-01 DIAGNOSIS — M76.829 POSTERIOR TIBIALIS MUSCLE DYSFUNCTION: ICD-10-CM

## 2018-02-01 DIAGNOSIS — M99.05 SOMATIC DYSFUNCTION OF PELVIS REGION: ICD-10-CM

## 2018-02-01 DIAGNOSIS — M25.572 PAIN IN JOINT, ANKLE AND FOOT, LEFT: ICD-10-CM

## 2018-02-01 DIAGNOSIS — M54.50 CHRONIC LEFT-SIDED LOW BACK PAIN WITHOUT SCIATICA: ICD-10-CM

## 2018-02-01 DIAGNOSIS — G89.29 CHRONIC LEFT-SIDED LOW BACK PAIN WITHOUT SCIATICA: ICD-10-CM

## 2018-02-01 DIAGNOSIS — M79.10 MYALGIA: ICD-10-CM

## 2018-02-01 PROCEDURE — 98940 CHIROPRACT MANJ 1-2 REGIONS: CPT | Performed by: CHIROPRACTOR

## 2018-02-01 PROCEDURE — 97110 THERAPEUTIC EXERCISES: CPT | Performed by: CHIROPRACTOR

## 2018-02-02 NOTE — PROGRESS NOTES
[place] [facility]      Subjective:  Valencia Roy   37 year old   female  Denies any changes in medications   CC:  general lower back L>R, L posterior hip, L posterior tibialis, R lateral foot  Visit: 23  Goal: run over 60 minutes without hip and back pain  Location:   lower back pain L>R, L medial lower shin  Comes in today doing well. Has had appoint with DO and PT today for lower back. Asked if I could focus on posterior tibialis. She notes she was much better with treatment and running is going well. Much less pain. Working on active care and denies any new issues. Notes back is a little better as well. Has taken out insole and that is going well. Notes some mild right lateral foot pain on occasion. Does not hurt with walking and only is sore after run for a little bit. Denies any other changes in health history.     Objective:  Inspection:  No SDD  No Scars  Normal Gait    Palpation:  No specific pain  Palpable soreness over left posterior hip, general lower back, L QL, L lower shin  Myofascitis 2/4 noted over  L hip ER, L QL, LPS, L posterior tibialis     ROM:  Lumbar flexion   90/90, tightness lower back  Lumbar extension  30/30, mild lower back discomfort   Right Hip IR  45/45  Left Hip IR  34/45  Right Hip ER  53/60  Left  hip ER  58/60  Ankle DF limited on L in talar neutral     MMT:  Left glute med 4/5 with no pain  Right glute med 5/5 with no pain  Left TFL 5/5 with lateral hip discomfort   Right TFL 5/5 with no pain    MET:  Left short leg    Squat:  Shift to left  Foot flare to right  Arm drop on left  B foot collapse     Lunge:  Left knee genu valgum  Leftt knee cross over     NAL:  Restricted SI on left side  Restricted talus L    Kemps (left lower back discomfort), SLR (-)  Able to toe walk and heel walk      Assessment:  NAL with associated myofascitis and weakness  Posterior tibialis dysfunction     Plan:  Patient tolerated treatment well today  Treatment Time: 45 minutes   47835  manipulation 1-2 segments: MET, Hip LAD  57218 manipulation: Manual extremity  70701 Manual therapy: (ART, Graston, Strain Counter Strain, Fascial Manipulation, Cupping) performed over area of L hip ER, L QL, L gastroc, L posterior tibialis, L plantar   15351 therapeutic exercise (20 minutes):  Self ankle mulligan, short foot lateral band walks, toe yoga, single leg balance on bosu ball, single leg hops on bosu ball, single leg hops and land off bosu ball, walking lunges with weight, single leg ball toss on trampoline, short foot activation exercises, alter G running: to be scheduled in [place], review of current PT exercises she is doing for back and ankle, made corrections as needed, standing hip hikes, bird dogs on stability ball, Mgill sit ups, clams shells, side lying hip rotation, standing QL stretch, single leg balance    Today: 4 way hip IR (will do 2 of them every other day), self hip mobility and traction with green rouge band, short foot activation   Strapping: ankle mulligan   Next visit: 3 weeks  Dry Needling:  L calf (did not do)  Today focused on calf and ankle   Shock wave: 15/4 over posterior tibialis       Atif Holliday DC, MEd, ATC

## 2018-02-19 ENCOUNTER — THERAPY VISIT (OUTPATIENT)
Dept: CHIROPRACTIC MEDICINE | Facility: CLINIC | Age: 38
End: 2018-02-19
Payer: COMMERCIAL

## 2018-02-19 DIAGNOSIS — M25.572 PAIN IN JOINT, ANKLE AND FOOT, LEFT: ICD-10-CM

## 2018-02-19 DIAGNOSIS — M76.829 POSTERIOR TIBIALIS MUSCLE DYSFUNCTION: ICD-10-CM

## 2018-02-19 DIAGNOSIS — M54.50 CHRONIC LEFT-SIDED LOW BACK PAIN WITHOUT SCIATICA: ICD-10-CM

## 2018-02-19 DIAGNOSIS — G89.29 CHRONIC LEFT-SIDED LOW BACK PAIN WITHOUT SCIATICA: ICD-10-CM

## 2018-02-19 DIAGNOSIS — M99.05 SOMATIC DYSFUNCTION OF PELVIS REGION: ICD-10-CM

## 2018-02-19 DIAGNOSIS — M79.10 MYALGIA: ICD-10-CM

## 2018-02-19 PROCEDURE — 97110 THERAPEUTIC EXERCISES: CPT | Performed by: CHIROPRACTOR

## 2018-02-19 PROCEDURE — 98940 CHIROPRACT MANJ 1-2 REGIONS: CPT | Performed by: CHIROPRACTOR

## 2018-02-20 NOTE — PROGRESS NOTES
[facility]      Subjective:  Valencia Roy   37 year old   female  Denies any changes in medications   CC:  general lower back L>R, L posterior hip, L posterior tibialis, R lateral foot  Visit: 23  Goal: run over 60 minutes without hip and back pain  Location:   lower back pain L>R, L medial lower shin  Comes in today doing well. Has had appoint with DO and PT today for lower back. Asked if I could focus on posterior tibialis. She notes she was much better with treatment and running is going well. Much less pain. Working on active care and denies any new issues. Notes back is a little better as well. Notes the insole does help and will run with hit. Lateral foot on right is a little sore. Denies any other issues.     Objective:  Inspection:  No SDD  No Scars  Normal Gait    Palpation:  No specific pain  Palpable soreness over left posterior hip, general lower back, L QL, L lower shin, R peroneals   Myofascitis 2/4 noted over  L hip ER, L QL, LPS, L posterior tibialis     ROM:  Lumbar flexion   90/90, tightness lower back  Lumbar extension  30/30, mild lower back discomfort   Right Hip IR  45/45  Left Hip IR  34/45  Right Hip ER  53/60  Left  hip ER  58/60  Ankle DF limited on L in talar neutral     MMT:  Left glute med 4/5 with no pain  Right glute med 5/5 with no pain  Left TFL 5/5 with lateral hip discomfort   Right TFL 5/5 with no pain  Ankle eversion 5/5 with  Mild discomfort on right lateral foot    MET:  Left short leg    Squat:  Shift to left  Foot flare to right  Arm drop on left  B foot collapse     Lunge:  Left knee genu valgum  Leftt knee cross over     NAL:  Restricted SI on left side  Restricted talus L    Kemps (left lower back discomfort), SLR (-)  Able to toe walk and heel walk      Assessment:  NAL with associated myofascitis and weakness  Posterior tibialis dysfunction     Plan:  Patient tolerated treatment well today  Treatment Time: 45 minutes   87501 manipulation 1-2  segments: MET, Hip LAD  97663 manipulation: Manual extremity  12940 Manual therapy: (ART, Graston, Strain Counter Strain, Fascial Manipulation, Cupping) performed over area of L hip ER, L QL, L gastroc, L posterior tibialis, L plantar, R peroneals   76715 therapeutic exercise (20 minutes):  Self ankle mulligan, short foot lateral band walks, toe yoga, single leg balance on bosu ball, single leg hops on bosu ball, single leg hops and land off bosu ball, walking lunges with weight, single leg ball toss on trampoline, short foot activation exercises, alter G running: to be scheduled in [place], review of current PT exercises she is doing for back and ankle, made corrections as needed, standing hip hikes, bird dogs on stability ball, Mgill sit ups, clams shells, side lying hip rotation, standing QL stretch, single leg balance    Today: 4 way hip IR (will do 2 of them every other day), self hip mobility and traction with green rouge band, short foot activation, eccentric eversion with band  Strapping: ankle mulligan   Next visit: 3 weeks  Dry Needling:  L calf (did not do)  Today focused on calf and ankle   Shock wave: 15/5 over posterior tibialis       Atif Holliday DC, MEd, ATC

## 2018-03-26 ENCOUNTER — THERAPY VISIT (OUTPATIENT)
Dept: CHIROPRACTIC MEDICINE | Facility: CLINIC | Age: 38
End: 2018-03-26
Payer: COMMERCIAL

## 2018-03-26 DIAGNOSIS — M76.829 POSTERIOR TIBIALIS MUSCLE DYSFUNCTION: ICD-10-CM

## 2018-03-26 DIAGNOSIS — M25.572 PAIN IN JOINT, ANKLE AND FOOT, LEFT: ICD-10-CM

## 2018-03-26 DIAGNOSIS — M54.50 CHRONIC LEFT-SIDED LOW BACK PAIN WITHOUT SCIATICA: ICD-10-CM

## 2018-03-26 DIAGNOSIS — M79.10 MYALGIA: ICD-10-CM

## 2018-03-26 DIAGNOSIS — M99.05 SOMATIC DYSFUNCTION OF PELVIS REGION: Primary | ICD-10-CM

## 2018-03-26 DIAGNOSIS — G89.29 CHRONIC LEFT-SIDED LOW BACK PAIN WITHOUT SCIATICA: ICD-10-CM

## 2018-03-26 PROCEDURE — 98940 CHIROPRACT MANJ 1-2 REGIONS: CPT | Mod: AT | Performed by: CHIROPRACTOR

## 2018-03-26 PROCEDURE — 97110 THERAPEUTIC EXERCISES: CPT | Performed by: CHIROPRACTOR

## 2018-03-27 NOTE — PROGRESS NOTES
[facility]      Subjective:  Valencia Roy   37 year old   female  Denies any changes in medications   CC:  general lower back L>R, L posterior hip, L posterior tibialis, R lateral foot  Visit: 24  Goal: run over 60 minutes without hip and back pain  Location:   lower back pain L>R, L medial lower shin  Comes in today doing well. Continues to progress with running. Notes right lateral foot no change but progressing with activity. Notes that left medial foot is doing much better. Occasional soreness over medial ankle but active care, PT, and occasional shock wave and therapy helps maintain her activity level with less pain. Denies any new changes in health history.     Objective:  Inspection:  No SDD  No Scars  Normal Gait    Palpation:  No specific pain  Palpable soreness over left posterior hip, general lower back, L QL, L lower shin, R peroneals   Myofascitis 2/4 noted over  L hip ER, L QL, LPS, L posterior tibialis     ROM:  Lumbar flexion   90/90, tightness lower back  Lumbar extension  30/30, mild lower back discomfort   Right Hip IR  45/45  Left Hip IR  34/45  Right Hip ER  53/60  Left  hip ER  58/60  Ankle DF limited on L in talar neutral     MMT:  Left glute med 4/5 with no pain  Right glute med 5/5 with no pain  Left TFL 5/5 with lateral hip discomfort   Right TFL 5/5 with no pain  Ankle eversion 5/5 with  Mild discomfort on right lateral foot    MET:  Left short leg    Squat:  Shift to left  Foot flare to right  Arm drop on left  B foot collapse     Lunge:  Left knee genu valgum  Leftt knee cross over     NAL:  Restricted SI on left side  Restricted talus L    Kemps (left lower back discomfort), SLR (-)  Able to toe walk and heel walk      Assessment:  NAL with associated myofascitis and weakness  Posterior tibialis dysfunction     Plan:  Patient tolerated treatment well today  Treatment Time: 45 minutes   41315 manipulation 1-2 segments: MET, Hip LAD  28840 manipulation: Manual  extremity  28220 Manual therapy: (ART, Graston, Strain Counter Strain, Fascial Manipulation, Cupping) performed over area of L hip ER, L QL, L gastroc, L posterior tibialis, L plantar, R peroneals   43644 therapeutic exercise (20 minutes):  Self ankle mulligan, short foot lateral band walks, toe yoga, single leg balance on bosu ball, single leg hops on bosu ball, single leg hops and land off bosu ball, walking lunges with weight, single leg ball toss on trampoline, short foot activation exercises, alter G running: to be scheduled in [place], review of current PT exercises she is doing for back and ankle, made corrections as needed, standing hip hikes, bird dogs on stability ball, Mgill sit ups, clams shells, side lying hip rotation, standing QL stretch, single leg balance    Today: 4 way hip IR (will do 2 of them every other day), self hip mobility and traction with green rouge band, short foot activation, eccentric eversion with band  Strapping: ankle alex   Next visit: 3 weeks  Dry Needling:  L calf (did not do)  Today focused on calf and ankle   Shock wave: 15/5 over posterior tibialis L, R lateral foot 5/3      Atif Holliday DC, MEd, ATC

## 2018-05-15 ASSESSMENT — ENCOUNTER SYMPTOMS
MUSCLE WEAKNESS: 0
MUSCLE CRAMPS: 0
ARTHRALGIAS: 0
MYALGIAS: 0
BACK PAIN: 1
JOINT SWELLING: 0
NECK PAIN: 0
STIFFNESS: 0

## 2018-05-15 ASSESSMENT — ANXIETY QUESTIONNAIRES
4. TROUBLE RELAXING: NOT AT ALL
3. WORRYING TOO MUCH ABOUT DIFFERENT THINGS: NOT AT ALL
7. FEELING AFRAID AS IF SOMETHING AWFUL MIGHT HAPPEN: NOT AT ALL
5. BEING SO RESTLESS THAT IT IS HARD TO SIT STILL: NOT AT ALL
1. FEELING NERVOUS, ANXIOUS, OR ON EDGE: NOT AT ALL
7. FEELING AFRAID AS IF SOMETHING AWFUL MIGHT HAPPEN: NOT AT ALL
2. NOT BEING ABLE TO STOP OR CONTROL WORRYING: NOT AT ALL
GAD7 TOTAL SCORE: 0
GAD7 TOTAL SCORE: 0
6. BECOMING EASILY ANNOYED OR IRRITABLE: NOT AT ALL

## 2018-05-16 ENCOUNTER — OFFICE VISIT (OUTPATIENT)
Dept: FAMILY MEDICINE | Facility: CLINIC | Age: 38
End: 2018-05-16
Attending: FAMILY MEDICINE
Payer: COMMERCIAL

## 2018-05-16 VITALS
SYSTOLIC BLOOD PRESSURE: 113 MMHG | BODY MASS INDEX: 24.4 KG/M2 | WEIGHT: 137.7 LBS | HEART RATE: 56 BPM | HEIGHT: 63 IN | DIASTOLIC BLOOD PRESSURE: 73 MMHG

## 2018-05-16 DIAGNOSIS — Z13.1 SCREENING FOR DIABETES MELLITUS: ICD-10-CM

## 2018-05-16 DIAGNOSIS — Z00.00 ANNUAL PHYSICAL EXAM: Primary | ICD-10-CM

## 2018-05-16 DIAGNOSIS — Z13.220 SCREENING FOR CHOLESTEROL LEVEL: ICD-10-CM

## 2018-05-16 DIAGNOSIS — Z13.29 SCREENING FOR THYROID DISORDER: ICD-10-CM

## 2018-05-16 DIAGNOSIS — F07.81 POSTCONCUSSION SYNDROME: ICD-10-CM

## 2018-05-16 DIAGNOSIS — E55.9 VITAMIN D DEFICIENCY: ICD-10-CM

## 2018-05-16 DIAGNOSIS — Z23 IMMUNIZATION DUE: ICD-10-CM

## 2018-05-16 PROBLEM — R73.01 IMPAIRED FASTING GLUCOSE: Status: ACTIVE | Noted: 2018-05-16

## 2018-05-16 PROBLEM — G89.29 CHRONIC LEFT-SIDED LOW BACK PAIN WITHOUT SCIATICA: Status: RESOLVED | Noted: 2017-09-02 | Resolved: 2018-05-16

## 2018-05-16 PROBLEM — T78.40XA ALLERGIC STATE: Status: ACTIVE | Noted: 2018-05-16

## 2018-05-16 PROBLEM — S06.0XAA CONCUSSION: Status: ACTIVE | Noted: 2018-05-16

## 2018-05-16 PROBLEM — T78.40XA ALLERGIC STATE: Status: RESOLVED | Noted: 2018-05-16 | Resolved: 2018-05-16

## 2018-05-16 PROBLEM — S06.0XAA CONCUSSION: Status: RESOLVED | Noted: 2018-05-16 | Resolved: 2018-05-16

## 2018-05-16 PROBLEM — M99.05 SOMATIC DYSFUNCTION OF PELVIS REGION: Status: RESOLVED | Noted: 2017-09-26 | Resolved: 2018-05-16

## 2018-05-16 PROBLEM — M25.572 PAIN IN JOINT, ANKLE AND FOOT, LEFT: Status: RESOLVED | Noted: 2018-01-04 | Resolved: 2018-05-16

## 2018-05-16 PROBLEM — K58.9 IRRITABLE BOWEL SYNDROME: Status: ACTIVE | Noted: 2018-05-16

## 2018-05-16 PROBLEM — M76.829 POSTERIOR TIBIALIS MUSCLE DYSFUNCTION: Status: RESOLVED | Noted: 2018-01-04 | Resolved: 2018-05-16

## 2018-05-16 PROBLEM — E78.5 HYPERLIPIDEMIA: Status: ACTIVE | Noted: 2018-05-16

## 2018-05-16 PROBLEM — M54.50 CHRONIC LEFT-SIDED LOW BACK PAIN WITHOUT SCIATICA: Status: RESOLVED | Noted: 2017-09-02 | Resolved: 2018-05-16

## 2018-05-16 PROBLEM — M79.10 MYALGIA: Status: RESOLVED | Noted: 2017-09-26 | Resolved: 2018-05-16

## 2018-05-16 PROBLEM — R73.01 IMPAIRED FASTING GLUCOSE: Status: RESOLVED | Noted: 2018-05-16 | Resolved: 2018-05-16

## 2018-05-16 LAB
ANION GAP SERPL CALCULATED.3IONS-SCNC: 8 MMOL/L (ref 3–14)
BUN SERPL-MCNC: 13 MG/DL (ref 7–30)
CALCIUM SERPL-MCNC: 9.8 MG/DL (ref 8.5–10.1)
CHLORIDE SERPL-SCNC: 106 MMOL/L (ref 94–109)
CHOLEST SERPL-MCNC: 192 MG/DL
CO2 SERPL-SCNC: 26 MMOL/L (ref 20–32)
CREAT SERPL-MCNC: 0.82 MG/DL (ref 0.52–1.04)
DEPRECATED CALCIDIOL+CALCIFEROL SERPL-MC: 32 UG/L (ref 20–75)
GFR SERPL CREATININE-BSD FRML MDRD: 78 ML/MIN/1.7M2
GLUCOSE SERPL-MCNC: 94 MG/DL (ref 70–99)
HDLC SERPL-MCNC: 66 MG/DL
LDLC SERPL CALC-MCNC: 110 MG/DL
NONHDLC SERPL-MCNC: 126 MG/DL
POTASSIUM SERPL-SCNC: 3.6 MMOL/L (ref 3.4–5.3)
SODIUM SERPL-SCNC: 140 MMOL/L (ref 133–144)
T3FREE SERPL-MCNC: 2.6 PG/ML (ref 2.3–4.2)
T4 FREE SERPL-MCNC: 1.1 NG/DL (ref 0.76–1.46)
TRIGL SERPL-MCNC: 81 MG/DL
TSH SERPL DL<=0.005 MIU/L-ACNC: 0.94 MU/L (ref 0.4–4)

## 2018-05-16 PROCEDURE — 80048 BASIC METABOLIC PNL TOTAL CA: CPT | Performed by: FAMILY MEDICINE

## 2018-05-16 PROCEDURE — 80061 LIPID PANEL: CPT | Performed by: FAMILY MEDICINE

## 2018-05-16 PROCEDURE — 25000128 H RX IP 250 OP 636: Mod: ZF

## 2018-05-16 PROCEDURE — 90715 TDAP VACCINE 7 YRS/> IM: CPT | Mod: ZF

## 2018-05-16 PROCEDURE — 36415 COLL VENOUS BLD VENIPUNCTURE: CPT | Performed by: FAMILY MEDICINE

## 2018-05-16 PROCEDURE — 84481 FREE ASSAY (FT-3): CPT | Performed by: FAMILY MEDICINE

## 2018-05-16 PROCEDURE — G0463 HOSPITAL OUTPT CLINIC VISIT: HCPCS

## 2018-05-16 PROCEDURE — 84443 ASSAY THYROID STIM HORMONE: CPT | Performed by: FAMILY MEDICINE

## 2018-05-16 PROCEDURE — 84439 ASSAY OF FREE THYROXINE: CPT | Performed by: FAMILY MEDICINE

## 2018-05-16 PROCEDURE — 82306 VITAMIN D 25 HYDROXY: CPT | Performed by: FAMILY MEDICINE

## 2018-05-16 PROCEDURE — 90471 IMMUNIZATION ADMIN: CPT

## 2018-05-16 RX ORDER — MELATONIN 10 MG
1 TABLET, SUBLINGUAL SUBLINGUAL WEEKLY
COMMUNITY

## 2018-05-16 ASSESSMENT — ANXIETY QUESTIONNAIRES: GAD7 TOTAL SCORE: 0

## 2018-05-16 ASSESSMENT — PAIN SCALES - GENERAL: PAINLEVEL: NO PAIN (0)

## 2018-05-16 NOTE — PROGRESS NOTES
Valencia is a 35 year old female presents for Annual exam:  HPI:  - Getting  in October 2018.  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  Planning on  Pregnancy any time.  Will start a prenantal [pola Najera], Zinc 15 mg, requesting labs for infertility.     HX: Post Concussion syndrome [July 2013] : sleep difficulty is the residual effect.  Able to run and works without difficulty.   -  Saw Nusrat Motta and Dr. Anders both very helpful  -  Seeing acupuncturist at [facility]  ROS:  General:doing well.   Head/Eyes: none  Ears/Nose/Throat: none  Cardiovascular: none  Respiratory: none  Gastrointestinal: none  Breast: none  Genitourinary: none  Sexual Function: none  Musculoskeletal: none  Skin: none  Neurological: cognitive status is stable.  Mental Health: none  Endocrine: none  OB/GYN HISTORY:   G0.  No contraception. Regular cycles [wants to get pregnant]  PAST MEDICAL HISTORY:  Past Medical History:   Diagnosis Date     Concussion 2013     Multiple food allergies    Life Style Modifiers:   Tobacco:  reports that she has never smoked. She has never used smokeless tobacco.   Alcohol:  reports that she does not drink alcohol.   Drug use:  reports that she does not use illicit drugs.  Exercise:  limited                  Diet: eliminates wheat and dairy   HCM: PAP/HPV negative 9/2017 [Dr. Benavides]  CAM Providers:      Nutritionist: Yes    Naturopath: Yes    Chiropractor: Yes; Functional neurologist    Energy Healer: Yes    Acupuncture: Yes  PAST SURGICAL HISTORY:  Past Surgical History:   Procedure Laterality Date     HC TOOTH EXTRACTION W/FORCEP     FAMILY HISTORY:  Mother with breast cancer 70   Family History   Problem Relation Age of Onset     Kidney Cancer Maternal Grandmother      DIABETES Maternal Grandmother      Coronary Artery Disease Maternal Grandmother      CEREBROVASCULAR DISEASE Maternal Grandmother      Coronary Artery Disease Maternal Grandfather      DIABETES  "Maternal Grandfather      Obesity Maternal Grandfather      Breast Cancer Other      Prostate Cancer Other    SOCIAL HISTORY:  Single. Father is [place] Islander.  [not too stressful]. No children. Not partnered. Moved to [facility] 9.2010. [place] lives in [place].  MEDICATIONS:  Current Outpatient Prescriptions   Medication Sig Dispense Refill     Digestive Enzymes (DIGESTIVE ENZYME PO)        MELATONIN PO        Misc Natural Products (DAILY HERBS RELAX/EASE TENSION PO)        Multiple Minerals (CALCIUM/MAGNESIUM/ZINC PO) Vitamin D  Metabolic Maintenance       Nutritional Supplements (NUTRITIONAL SUPPLEMENT PO) Liver GI Detox       Omega-3 Fatty Acids (EPA PLUS PO)        UNABLE TO FIND EstroDim     ALLERGIES:  Review of patient's allergies indicates no known allergies.  VITALS:  Blood pressure 113/73, pulse 56, height 1.594 m (5' 2.75\"), weight 62.5 kg (137 lb 11.2 oz), last menstrual period 05/05/2018.   PHYSICAL EXAM:  PHYSICAL EXAM:  Constitutional: Well appearing woman in no acute distress.   Psychological: appropriate mood.  Eyes: anicteric, normal extra-ocular movements.  Cardiovascular: regular rate and rhythm, normal S1 and S2, no murmurs, rubs or gallops, peripheral pulses full and symmetric   Respiratory: clear to auscultation, no wheezes or crackles, normal breath sounds.  Gastrointestinal: positive bowel sounds, nontender, no hepatosplenomegaly, no masses. No guarding or rebound.  Musculoskeletal: full range of motion    Skin: no concerning lesions, no jaundice.  Neurological: normal gait, no tremor.   Diagnoses and associated orders for this visit:  Annual Exam:   - PAP/HPV negative 9/2017   - mammogram at 43   - Prenatal vitamins, discussed ovulation testing, If no pregnant by 1/2019 will start infertility work up  Post concussion syndrome: Insomnia improved   -  Almost baseline.  Continues with acupuncturist  Vitamin D deficiency  -     25- [place]-Vitamin D; Future  Screening for cholesterol level  -  "    Lipid Panel; Future  Screening for diabetes mellitus  -     Basic Metabolic Panel; Future  Screening for thyroid disorder  -     TSH; Future  -     T3 Free; Future  -     T4 free; Future  Immunization due  -     TDAP VACCINE (BOOSTRIX)

## 2018-05-16 NOTE — LETTER
5/16/2018       RE: Valencia Roy  [address]   [facility] [address]     Dear Colleague,    Thank you for referring your patient, Valencia Roy, to the [facility] at [facility]. Please see a copy of my visit note below.    Valencia is a 35 year old female presents for Annual exam:  HPI:  - Getting  in October 2018.  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  Planning on  Pregnancy any time.  Will start a prenantal [pola Najera], Zinc 15 mg, requesting labs for infertility.     HX: Post Concussion syndrome [July 2013] : sleep difficulty is the residual effect.  Able to run and works without difficulty.   -  Saw Nusrat Motta and Dr. Anders both very helpful  -  Seeing acupuncturist at [facility]  ROS:  General:doing well.   Head/Eyes: none  Ears/Nose/Throat: none  Cardiovascular: none  Respiratory: none  Gastrointestinal: none  Breast: none  Genitourinary: none  Sexual Function: none  Musculoskeletal: none  Skin: none  Neurological: cognitive status is stable.  Mental Health: none  Endocrine: none  OB/GYN HISTORY:   G0.  No contraception. Regular cycles [wants to get pregnant]  PAST MEDICAL HISTORY:  Past Medical History:   Diagnosis Date     Concussion 2013     Multiple food allergies    Life Style Modifiers:   Tobacco:  reports that she has never smoked. She has never used smokeless tobacco.   Alcohol:  reports that she does not drink alcohol.   Drug use:  reports that she does not use illicit drugs.  Exercise:  limited                  Diet: eliminates wheat and dairy   HCM: PAP/HPV negative 9/2017 [Dr. Benavides]  CAM Providers:      Nutritionist: Yes    Naturopath: Yes    Chiropractor: Yes; Functional neurologist    Energy Healer: Yes    Acupuncture: Yes  PAST SURGICAL HISTORY:  Past Surgical History:   Procedure Laterality Date     HC TOOTH EXTRACTION W/FORCEP     FAMILY HISTORY:  Mother with  "breast cancer 70   Family History   Problem Relation Age of Onset     Kidney Cancer Maternal Grandmother      DIABETES Maternal Grandmother      Coronary Artery Disease Maternal Grandmother      CEREBROVASCULAR DISEASE Maternal Grandmother      Coronary Artery Disease Maternal Grandfather      DIABETES Maternal Grandfather      Obesity Maternal Grandfather      Breast Cancer Other      Prostate Cancer Other    SOCIAL HISTORY:  Single. Father is Azeri.  [not too stressful]. No children. Not partnered. Moved to [facility] 9.2010. Family lives in [place]. [place].  MEDICATIONS:  Current Outpatient Prescriptions   Medication Sig Dispense Refill     Digestive Enzymes (DIGESTIVE ENZYME PO)        MELATONIN PO        Misc Natural Products (DAILY HERBS RELAX/EASE TENSION PO)        Multiple Minerals (CALCIUM/MAGNESIUM/ZINC PO) Vitamin D  Metabolic Maintenance       Nutritional Supplements (NUTRITIONAL SUPPLEMENT PO) Liver GI Detox       Omega-3 Fatty Acids (EPA PLUS PO)        UNABLE TO FIND EstroDim     ALLERGIES:  Review of patient's allergies indicates no known allergies.  VITALS:  Blood pressure 113/73, pulse 56, height 1.594 m (5' 2.75\"), weight 62.5 kg (137 lb 11.2 oz), last menstrual period 05/05/2018.   PHYSICAL EXAM:  PHYSICAL EXAM:  Constitutional: Well appearing woman in no acute distress.   Psychological: appropriate mood.  Eyes: anicteric, normal extra-ocular movements.  Cardiovascular: regular rate and rhythm, normal S1 and S2, no murmurs, rubs or gallops, peripheral pulses full and symmetric   Respiratory: clear to auscultation, no wheezes or crackles, normal breath sounds.  Gastrointestinal: positive bowel sounds, nontender, no hepatosplenomegaly, no masses. No guarding or rebound.  Musculoskeletal: full range of motion    Skin: no concerning lesions, no jaundice.  Neurological: normal gait, no tremor.   Diagnoses and associated orders for this visit:  Annual Exam:   - PAP/HPV negative 9/2017   - " mammogram at 43   - Prenatal vitamins, discussed ovulation testing, If no pregnant by 1/2019 will start infertility work up  Post concussion syndrome: Insomnia improved   -  Almost baseline.  Continues with acupuncturist  Vitamin D deficiency  -     25- [place]-Vitamin D; Future  Screening for cholesterol level  -     Lipid Panel; Future  Screening for diabetes mellitus  -     Basic Metabolic Panel; Future  Screening for thyroid disorder  -     TSH; Future  -     T3 Free; Future  -     T4 free; Future  Immunization due  -     TDAP VACCINE (BOOSTRIX)    Again, thank you for allowing me to participate in the care of your patient.      Sincerely,    Diana Leigh MD

## 2018-05-16 NOTE — NURSING NOTE
Chief Complaint   Patient presents with     RECHECK     Pt here to discuss preconception planning, vitamin D levels and lipids.

## 2018-05-16 NOTE — MR AVS SNAPSHOT
"              After Visit Summary   5/16/2018    Valencia Roy    MRN: [id number]           Patient Information     Date Of Birth          1980        Visit Information        Provider Department      5/16/2018 8:20 AM Diana Leigh MD [facility]        Today's Diagnoses     Annual physical exam    -  1    Vitamin D deficiency        Screening for cholesterol level        Screening for diabetes mellitus        Screening for thyroid disorder        Immunization due        Postconcussion syndrome           Follow-ups after your visit        Who to contact     Please call your clinic at [phone] to:    Ask questions about your health    Make or cancel appointments    Discuss your medicines    Learn about your test results    Speak to your doctor            Additional Information About Your Visit        e[employer] Information     [employer] gives you secure access to your electronic health record. If you see a primary care provider, you can also send messages to your care team and make appointments. If you have questions, please call your primary care clinic.  If you do not have a primary care provider, please call [phone] and they will assist you.      [employer] is an electronic gateway that provides easy, online access to your medical records. With [employer], you can request a clinic appointment, read your test results, renew a prescription or communicate with your care team.     To access your existing account, please contact your [facility] or call [phone] for assistance.        Care EveryWhere ID     This is your Care EveryWhere ID. This could be used by other organizations to access your [place] medical records  [id number]        Your Vitals Were     Pulse Height Last Period BMI (Body Mass Index)          56 1.594 m (5' 2.75\") 05/05/2018 24.59 kg/m2         Blood Pressure from Last 3 Encounters:   05/16/18 113/73   09/05/17 123/80 "   12/15/15 129/72    Weight from Last 3 Encounters:   05/16/18 62.5 kg (137 lb 11.2 oz)   09/05/17 62.3 kg (137 lb 6.4 oz)   12/15/15 62.6 kg (138 lb)              We Performed the Following     TDAP VACCINE (BOOSTRIX)        Primary Care Provider Office Phone # Fax #    [facility] [phone]       [address] [facility] [address]        Equal Access to Services     RENY LINDSEY : Hadii aad ku hadasho Soomaali, waaxda luqadaha, qaybta kaalmada adeegyada, waxdeuce asif haysuzanne dixon . So [facility] [phone].    ATENCIÓN: Si habla español, tiene a martínez disposición servicios gratuitos de asistencia lingüística. Llame al [phone].    We comply with applicable federal civil rights laws and [place] laws. We do not discriminate on the basis of race, color, national origin, age, disability, sex, sexual orientation, or gender identity.            Thank you!     Thank you for choosing [facility]  for your care. Our goal is always to provide you with excellent care. Hearing back from our patients is one way we can continue to improve our services. Please take a few minutes to complete the written survey that you may receive in the mail after your visit with us. Thank you!             Your Updated Medication List - Protect others around you: Learn how to safely use, store and throw away your medicines at www.disposemymeds.org.          This list is accurate as of 5/16/18  1:13 PM.  Always use your most recent med list.                   Brand Name Dispense Instructions for use Diagnosis    CALCIUM/MAGNESIUM/ZINC PO      Vitamin D Metabolic Maintenance        DAILY HERBS RELAX/EASE TENSION PO           DIGESTIVE ENZYME PO           EPA PLUS PO           MELATONIN PO           NUTRITIONAL SUPPLEMENT PO      Liver GI Detox        UNABLE TO FIND      EstroDim        Vitamin D3 28490 units Tabs      Take 1 capsule by mouth once a week

## 2018-06-20 ENCOUNTER — THERAPY VISIT (OUTPATIENT)
Dept: CHIROPRACTIC MEDICINE | Facility: CLINIC | Age: 38
End: 2018-06-20
Payer: COMMERCIAL

## 2018-06-20 DIAGNOSIS — M79.10 MYALGIA: ICD-10-CM

## 2018-06-20 DIAGNOSIS — M25.572 PAIN IN JOINT, ANKLE AND FOOT, LEFT: ICD-10-CM

## 2018-06-20 DIAGNOSIS — M99.05 SOMATIC DYSFUNCTION OF PELVIS REGION: Primary | ICD-10-CM

## 2018-06-20 DIAGNOSIS — M25.552 HIP PAIN, LEFT: ICD-10-CM

## 2018-06-20 PROCEDURE — 97110 THERAPEUTIC EXERCISES: CPT | Performed by: CHIROPRACTOR

## 2018-06-20 PROCEDURE — 98940 CHIROPRACT MANJ 1-2 REGIONS: CPT | Mod: AT | Performed by: CHIROPRACTOR

## 2018-06-20 NOTE — PROGRESS NOTES
[facility]      Subjective:  Valencia Roy   37 year old   female  Denies any changes in medications   CC:  general lower back L>R, L posterior hip, L posterior tibialis, R lateral foot  Visit: 25  Goal: run over 60 minutes without hip and back pain  Location:   lower back pain L>R, L medial lower shin  Comes in today doing very well since last session. Her training is going very well. Started new Pilate's and fascial work person and that is going well. Comes in today for some continued left ankle and left hip discomfort. Pain is worse after activity and able to maintain how she is doing with active care program. Denies any new issues or changes in health history. Has been focusing on more speed work.     Objective:  Inspection:  No SDD  No Scars  Normal Gait    Palpation:  No specific pain  Palpable soreness over left posterior hip, general lower back, L QL, L lower shin,  Myofascitis 2/4 noted over  L hip ER, L QL, LPS, L posterior tibialis     ROM:  Lumbar flexion   90/90, tightness lower back  Lumbar extension  30/30, mild lower back discomfort   Right Hip IR  45/45  Left Hip IR  34/45  Right Hip ER  53/60  Left  hip ER  58/60  Ankle DF limited on L in talar neutral     MMT:  Left glute med 4/5 with no pain  Right glute med 5/5 with no pain  Left TFL 5/5 with lateral hip discomfort   Right TFL 5/5 with no pain  Ankle eversion 5/5 with  Mild discomfort on right lateral foot    MET:  Left short leg    Squat:  Shift to left  Foot flare to right  Arm drop on left  B foot collapse     Lunge:  Left knee genu valgum  Leftt knee cross over     NAL:  Restricted SI on left side  Restricted talus L    Kemps (left lower back discomfort), SLR (-)  Able to toe walk and heel walk      Assessment:  NAL with associated myofascitis and weakness  Posterior tibialis dysfunction     Plan:  Patient tolerated treatment well today  Treatment Time: 45 minutes   30083 manipulation 1-2 segments: MET, Hip  LAD  25130 manipulation: Manual extremity  85352 Manual therapy: (ART, Graston, Strain Counter Strain, Fascial Manipulation, Cupping) performed over area of L hip ER, L QL, L gastroc, L posterior tibialis, L plantar, R peroneals   60729 therapeutic exercise (20 minutes):  Self ankle mulligan, short foot lateral band walks, toe yoga, single leg balance on bosu ball, single leg hops on bosu ball, single leg hops and land off bosu ball, walking lunges with weight, single leg ball toss on trampoline, short foot activation exercises, alter G running: to be scheduled in [place], review of current PT exercises she is doing for back and ankle, made corrections as needed, standing hip hikes, bird dogs on stability ball, Mgill sit ups, clams shells, side lying hip rotation, standing QL stretch, single leg balance    Today: 4 way hip IR (will do 2 of them every other day), self hip mobility and traction with green rouge band, short foot activation, eccentric eversion with band, bottom leg hip ER  Strapping: ankle mulligan   Next visit: continue to push out  Dry Needling:  L calf (did not do)  Today focused on calf and ankle   Shock wave: 15/5 over posterior tibialis L, R lateral foot 5/3 (did not do)      Atif Holliday DC, MEd, ATC

## 2018-06-24 PROBLEM — M25.572 PAIN IN JOINT, ANKLE AND FOOT, LEFT: Status: ACTIVE | Noted: 2018-06-24

## 2018-07-12 ENCOUNTER — THERAPY VISIT (OUTPATIENT)
Dept: CHIROPRACTIC MEDICINE | Facility: CLINIC | Age: 38
End: 2018-07-12
Payer: COMMERCIAL

## 2018-07-12 DIAGNOSIS — M25.552 HIP PAIN, LEFT: ICD-10-CM

## 2018-07-12 DIAGNOSIS — M99.05 SOMATIC DYSFUNCTION OF PELVIS REGION: Primary | ICD-10-CM

## 2018-07-12 DIAGNOSIS — M79.10 MYALGIA: ICD-10-CM

## 2018-07-12 DIAGNOSIS — M25.572 PAIN IN JOINT, ANKLE AND FOOT, LEFT: ICD-10-CM

## 2018-07-12 PROCEDURE — 98940 CHIROPRACT MANJ 1-2 REGIONS: CPT | Mod: AT | Performed by: CHIROPRACTOR

## 2018-07-12 PROCEDURE — 97110 THERAPEUTIC EXERCISES: CPT | Performed by: CHIROPRACTOR

## 2018-07-15 NOTE — PROGRESS NOTES
[facility]      Subjective:  Valencia Roy   37 year old   female  Denies any changes in medications   CC:  general lower back L>R, L posterior hip, L posterior tibialis, R lateral foot  Visit: 26  Goal: run over 60 minutes without hip and back pain  Location:   lower back pain L>R, L medial lower shin  Comes in today doing very well since last session. Her training is going very well. Notes her Pilates is doing much better and has less pain. Notes most discomfort today over left medial ankle from running. She is pleased with progress she has made. Notes tightness left posterior hip. Working on active care program.     Objective:  Inspection:  No SDD  No Scars  Normal Gait    Palpation:  No specific pain  Palpable soreness over left posterior hip, general lower back, L QL, L lower shin,  Myofascitis 2/4 noted over  L hip ER, L QL, LPS, L posterior tibialis     ROM:  Lumbar flexion   90/90, tightness lower back  Lumbar extension  30/30, mild lower back discomfort   Right Hip IR  45/45  Left Hip IR  34/45  Right Hip ER  53/60  Left  hip ER  58/60  Ankle DF limited on L in talar neutral     MMT:  Left glute med 4/5 with no pain  Right glute med 5/5 with no pain  Left TFL 5/5 with lateral hip discomfort   Right TFL 5/5 with no pain  Ankle eversion 5/5 with  Mild discomfort on right lateral foot    MET:  Left short leg    Squat:  Shift to left  Foot flare to right  Arm drop on left  B foot collapse     Lunge:  Left knee genu valgum  Leftt knee cross over     NAL:  Restricted SI on left side  Restricted talus L    Kemps (left lower back discomfort), SLR (-)  Able to toe walk and heel walk      Assessment:  NAL with associated myofascitis and weakness  Posterior tibialis dysfunction     Plan:  Patient tolerated treatment well today  Treatment Time: 45 minutes   15797 manipulation 1-2 segments: MET, Hip LAD  18213 manipulation: Manual extremity  16531 Manual therapy: (ART, Graston, Strain Counter  Strain, Fascial Manipulation, Cupping) performed over area of L hip ER, L QL, L gastroc, L posterior tibialis, L plantar, R peroneals   07999 therapeutic exercise (20 minutes):  Self ankle mulligan, short foot lateral band walks, toe yoga, single leg balance on bosu ball, single leg hops on bosu ball, single leg hops and land off bosu ball, walking lunges with weight, single leg ball toss on trampoline, short foot activation exercises, alter G running: to be scheduled in [place], review of current PT exercises she is doing for back and ankle, made corrections as needed, standing hip hikes, bird dogs on stability ball, Mgill sit ups, clams shells, side lying hip rotation, standing QL stretch, single leg balance    Today: 4 way hip IR (will do 2 of them every other day), self hip mobility and traction with green rouge band, short foot activation, eccentric eversion with band, bottom leg hip ER  Strapping: ankle mulligan   Next visit: continue to push out  Dry Needling:  L calf (did not do)  Today focused on calf and ankle   Shock wave: 15/5 over posterior tibialis L, R lateral foot 5/3 (did not do)      Atif Holliday DC, MEd, ATC

## 2018-08-23 ENCOUNTER — THERAPY VISIT (OUTPATIENT)
Dept: CHIROPRACTIC MEDICINE | Facility: CLINIC | Age: 38
End: 2018-08-23
Payer: COMMERCIAL

## 2018-08-23 DIAGNOSIS — M25.552 HIP PAIN, LEFT: ICD-10-CM

## 2018-08-23 DIAGNOSIS — M99.05 SOMATIC DYSFUNCTION OF PELVIS REGION: ICD-10-CM

## 2018-08-23 DIAGNOSIS — M25.572 PAIN IN JOINT, ANKLE AND FOOT, LEFT: ICD-10-CM

## 2018-08-23 DIAGNOSIS — M79.10 MYALGIA: ICD-10-CM

## 2018-08-23 PROCEDURE — 98940 CHIROPRACT MANJ 1-2 REGIONS: CPT | Mod: AT | Performed by: CHIROPRACTOR

## 2018-08-23 PROCEDURE — 97110 THERAPEUTIC EXERCISES: CPT | Performed by: CHIROPRACTOR

## 2018-08-26 NOTE — PROGRESS NOTES
[facility]      Subjective:  Valencia Roy   female  Denies any changes in medications   CC:  general lower back L>R, L posterior hip, L posterior tibialis, R lateral foot  Visit: 27  Goal: run over 60 minutes without hip and back pain  Location:   lower back pain L>R, L medial lower shin  Comes in today doing very well since last session. Her training is going very well. Notes her Pilates is doing much better and has less pain. Notes ankle is feeling good. Notes left lateral and posterior hip is most tight. Working on active care program does help. No radiating pain. Full go with activity. Worse with standing for over 30 minutes. Pain is 4/10 on average.     Objective:  Inspection:  No SDD  No Scars  Normal Gait    Palpation:  No specific pain  Palpable soreness over left posterior hip, general lower back, L QL, L lower shin,  Myofascitis 2/4 noted over  L hip ER, L QL, LPS, L posterior tibialis     ROM:  Lumbar flexion   90/90, tightness lower back  Lumbar extension  30/30, mild lower back discomfort   Right Hip IR  45/45  Left Hip IR  34/45  Right Hip ER  53/60  Left  hip ER  58/60  Ankle DF limited on L in talar neutral     MMT:  Left glute med 4/5 with no pain  Right glute med 5/5 with no pain  Left TFL 5/5 with lateral hip discomfort   Right TFL 5/5 with no pain  Ankle eversion 5/5 with  Mild discomfort on right lateral foot    MET:  Left short leg    Squat:  Shift to left  Foot flare to right  Arm drop on left  B foot collapse     Lunge:  Left knee genu valgum  Leftt knee cross over     NAL:  Restricted SI on left side  Restricted talus L    Kemps (left lower back discomfort), SLR (-)  Able to toe walk and heel walk      Assessment:  NAL with associated myofascitis and weakness  Posterior tibialis dysfunction     Plan:  Patient tolerated treatment well today  Treatment Time: 45 minutes   60598 manipulation 1-2 segments: MET, Hip LAD  57996 manipulation: Manual extremity  67037 Manual  therapy: (ART, Graston, Strain Counter Strain, Fascial Manipulation, Cupping) performed over area of L hip ER, L QL, L gastroc, L posterior tibialis, L plantar, R peroneals   43511 therapeutic exercise (20 minutes):  Self ankle mulligan, short foot lateral band walks, toe yoga, single leg balance on bosu ball, single leg hops on bosu ball, single leg hops and land off bosu ball, walking lunges with weight, single leg ball toss on trampoline, short foot activation exercises, alter G running: to be scheduled in [place], review of current PT exercises she is doing for back and ankle, made corrections as needed, standing hip hikes, bird dogs on stability ball, Mgill sit ups, clams shells, side lying hip rotation, standing QL stretch, single leg balance    Today: 4 way hip IR (will do 2 of them every other day), self hip mobility and traction with green rouge band, short foot activation, eccentric eversion with band, bottom leg hip ER, standing C stretch   Strapping: ankle alex   Next visit: continue to push out  Dry Needling:  L calf (did not do)  Today focused on calf and ankle   Shock wave: 15/8 over left lateral hip area       Atif Holliday DC, MEd, ATC

## 2018-09-07 ENCOUNTER — THERAPY VISIT (OUTPATIENT)
Dept: CHIROPRACTIC MEDICINE | Facility: CLINIC | Age: 38
End: 2018-09-07
Payer: COMMERCIAL

## 2018-09-07 DIAGNOSIS — M99.05 SOMATIC DYSFUNCTION OF PELVIS REGION: ICD-10-CM

## 2018-09-07 DIAGNOSIS — M25.572 PAIN IN JOINT, ANKLE AND FOOT, LEFT: ICD-10-CM

## 2018-09-07 DIAGNOSIS — M25.552 HIP PAIN, LEFT: ICD-10-CM

## 2018-09-07 DIAGNOSIS — M79.10 MYALGIA: ICD-10-CM

## 2018-09-10 NOTE — PROGRESS NOTES
Patient actually did not have appointment today and rescheduled as I had patient at this time. She is doing OK and fine with waiting until next available.

## 2018-09-24 ENCOUNTER — THERAPY VISIT (OUTPATIENT)
Dept: CHIROPRACTIC MEDICINE | Facility: CLINIC | Age: 38
End: 2018-09-24
Payer: COMMERCIAL

## 2018-09-24 DIAGNOSIS — M25.572 PAIN IN JOINT, ANKLE AND FOOT, LEFT: ICD-10-CM

## 2018-09-24 DIAGNOSIS — M79.10 MYALGIA: ICD-10-CM

## 2018-09-24 DIAGNOSIS — M99.05 SOMATIC DYSFUNCTION OF PELVIS REGION: ICD-10-CM

## 2018-09-24 DIAGNOSIS — M25.552 HIP PAIN, LEFT: ICD-10-CM

## 2018-09-24 PROCEDURE — 98940 CHIROPRACT MANJ 1-2 REGIONS: CPT | Mod: AT | Performed by: CHIROPRACTOR

## 2018-09-24 PROCEDURE — 97110 THERAPEUTIC EXERCISES: CPT | Performed by: CHIROPRACTOR

## 2018-10-02 NOTE — PROGRESS NOTES
[facility]      Subjective:  Valencia Roy   female  Denies any changes in medications   CC:  general lower back L>R, L posterior hip, L ankle sprain, R arch  Visit: 28  Goal: run over 60 minutes without hip and back pain  Comes in today doing well. Notes hip and back did very well after last session and notes that active care is keeping pain down. Notes had mild left ankle sprain yesterday (notes has chronic ankle sprains) and some right arch tightness. She is still running. Notes pain is 3/10 with sitting for long periods. Notes ankles are doing well and said the ankle sprain is very mild to what she has had in past.     Objective:  Inspection:  Mild swelling over left lateral foot  Normal Gait    Palpation:  No specific pain  Palpable soreness over left posterior hip, general lower back, L QL, L lateral ankle, R arch  Myofascitis 2/4 noted over  L hip ER, L QL, LPS, L posterior tibialis, R arch     ROM:  Lumbar flexion   90/90, tightness lower back  Lumbar extension  30/30, mild lower back discomfort   Right Hip IR  45/45  Left Hip IR  34/45  Right Hip ER  53/60  Left  hip ER  58/60  Ankle DF limited on L in talar neutral     MMT:  Left glute med 4/5 with no pain  Right glute med 5/5 with no pain  Left TFL 5/5 with lateral hip discomfort   Right TFL 5/5 with no pain  Ankle eversion 5/5 with  Mild discomfort on right lateral foot    MET:  Left short leg    Squat:  Shift to left  Foot flare to right  Arm drop on left  B foot collapse     Lunge:  Left knee genu valgum  Leftt knee cross over     NAL:  Restricted SI on left side  Restricted talus L    Kemps (left lower back discomfort), SLR (-)  Able to toe walk and heel walk      Assessment:  NAL with associated myofascitis and weakness  Posterior tibialis dysfunction, hip pain     Plan:  Patient tolerated treatment well today  Treatment Time: 45 minutes   77262 manipulation 1-2 segments: MET, Hip LAD  63651 manipulation: Manual  extremity  01786 Manual therapy: (ART, Graston, Strain Counter Strain, Fascial Manipulation, Cupping) performed over area of L hip ER, L QL, L gastroc, L posterior tibialis, L plantar, R peroneals, B arch   04360 therapeutic exercise (20 minutes):  Self ankle mulligan, short foot lateral band walks, toe yoga, single leg balance on bosu ball, single leg hops on bosu ball, single leg hops and land off bosu ball, walking lunges with weight, single leg ball toss on trampoline, short foot activation exercises, alter G running: to be scheduled in [place], review of current PT exercises she is doing for back and ankle, made corrections as needed, standing hip hikes, bird dogs on stability ball, Mgill sit ups, clams shells, side lying hip rotation, standing QL stretch, single leg balance    Today: 4 way hip IR (will do 2 of them every other day), self hip mobility and traction with green rouge band, short foot activation, eccentric eversion with band, bottom leg hip ER, standing C stretch, single leg balance for for foot   Strapping: ankle mulligan   Next visit: continue to push out  Dry Needling:  L calf (did not do)  Shock wave: 15/8 over left lateral hip area       Atif Holliday DC, MEd, ATC

## 2018-10-11 ENCOUNTER — THERAPY VISIT (OUTPATIENT)
Dept: CHIROPRACTIC MEDICINE | Facility: CLINIC | Age: 38
End: 2018-10-11
Payer: COMMERCIAL

## 2018-10-11 DIAGNOSIS — M25.552 HIP PAIN, LEFT: ICD-10-CM

## 2018-10-11 DIAGNOSIS — M79.10 MYALGIA: ICD-10-CM

## 2018-10-11 DIAGNOSIS — M25.572 PAIN IN JOINT, ANKLE AND FOOT, LEFT: ICD-10-CM

## 2018-10-11 DIAGNOSIS — M99.05 SOMATIC DYSFUNCTION OF PELVIS REGION: ICD-10-CM

## 2018-10-11 PROCEDURE — 98940 CHIROPRACT MANJ 1-2 REGIONS: CPT | Mod: AT | Performed by: CHIROPRACTOR

## 2018-10-11 PROCEDURE — 97110 THERAPEUTIC EXERCISES: CPT | Performed by: CHIROPRACTOR

## 2018-10-12 NOTE — PROGRESS NOTES
[facility]      Subjective:  Valencia Roy   female  Denies any changes in medications   CC:  general lower back L>R, L posterior hip, L ankle sprain, R arch  Visit: 29  Goal: run over 60 minutes without hip and back pain  Comes in today doing well. Notes right foot is feeling very good. Notes left ankle is doing much better as well. Left lateral hip is tight but responded well with last session. Denies any new issues. Very pleased with progress making. Has still been working in [employer] as well as had visit with her DO as well. Most pain sitting for long periods.     Objective:  Inspection:  Mild swelling over left lateral foot  Normal Gait    Palpation:  No specific pain  Palpable soreness over left posterior hip, general lower back, L QL, L lateral ankle, R arch  Myofascitis 2/4 noted over  L hip ER, L QL, LPS, L posterior tibialis, R arch     ROM:  Lumbar flexion   90/90, tightness lower back  Lumbar extension  30/30, mild lower back discomfort   Right Hip IR  45/45  Left Hip IR  34/45  Right Hip ER  53/60  Left  hip ER  58/60  Ankle DF limited on L in talar neutral     MMT:  Left glute med 4/5 with no pain  Right glute med 5/5 with no pain  Left TFL 5/5 with lateral hip discomfort   Right TFL 5/5 with no pain  Ankle eversion 5/5 with  Mild discomfort on right lateral foot    MET:  Left short leg    Squat:  Shift to left  Foot flare to right  Arm drop on left  B foot collapse     Lunge:  Left knee genu valgum  Leftt knee cross over     NAL:  Restricted SI on left side  Restricted talus L    Kemps (left lower back discomfort), SLR (-)  Able to toe walk and heel walk      Assessment:  NAL with associated myofascitis and weakness  Posterior tibialis dysfunction, hip pain     Plan:  Patient tolerated treatment well today  Treatment Time: 45 minutes   29676 manipulation 1-2 segments: MET, Hip LAD  25310 manipulation: Manual extremity  57579 Manual therapy: (ART, Graston, Strain Counter  Strain, Fascial Manipulation, Cupping) performed over area of L hip ER, L QL, L gastroc, L posterior tibialis, L plantar, R peroneals, B arch   66577 therapeutic exercise (20 minutes):  Self ankle mulligan, short foot lateral band walks, toe yoga, single leg balance on bosu ball, single leg hops on bosu ball, single leg hops and land off bosu ball, walking lunges with weight, single leg ball toss on trampoline, short foot activation exercises, alter G running: to be scheduled in [place], review of current PT exercises she is doing for back and ankle, made corrections as needed, standing hip hikes, bird dogs on stability ball, Mgill sit ups, clams shells, side lying hip rotation, standing QL stretch, single leg balance    Today: 4 way hip IR (will do 2 of them every other day), self hip mobility and traction with green rouge band, short foot activation, eccentric eversion with band, bottom leg hip ER, standing C stretch, single leg balance for for foot   Strapping: ankle mulligan   Next visit: continue to push out  Dry Needling:  L calf (did not do)  Shock wave: 15/9 over left lateral hip area       Atif Holliday DC, MEd, ATC

## 2018-11-05 ENCOUNTER — THERAPY VISIT (OUTPATIENT)
Dept: CHIROPRACTIC MEDICINE | Facility: CLINIC | Age: 38
End: 2018-11-05
Payer: COMMERCIAL

## 2018-11-05 DIAGNOSIS — M25.552 HIP PAIN, LEFT: ICD-10-CM

## 2018-11-05 DIAGNOSIS — M99.05 SOMATIC DYSFUNCTION OF PELVIS REGION: ICD-10-CM

## 2018-11-05 DIAGNOSIS — M25.572 PAIN IN JOINT, ANKLE AND FOOT, LEFT: ICD-10-CM

## 2018-11-05 DIAGNOSIS — M79.10 MYALGIA: ICD-10-CM

## 2018-11-05 PROCEDURE — 98940 CHIROPRACT MANJ 1-2 REGIONS: CPT | Mod: AT | Performed by: CHIROPRACTOR

## 2018-11-05 PROCEDURE — 97110 THERAPEUTIC EXERCISES: CPT | Performed by: CHIROPRACTOR

## 2018-11-06 NOTE — PROGRESS NOTES
[facility]      Subjective:  Valencia Roy   female  Denies any changes in medications   CC:  general lower back L>R, L posterior hip  Visit: 29  Goal: run over 60 minutes without hip and back pain  Comes in today doing very well. Pleased with progress she is making. Right arch is 100% fine. Left ankle is doing great. Left hip is best it has felt in very long time. Continues to work on active care program and stay active. She can sit for 30 minutes without any pain.     Objective:  Inspection:  No swelling  Normal Gait    Palpation:  No specific pain  Palpable soreness over left posterior hip, general lower back, L QL  Myofascitis 2/4 noted over  L hip ER, L QL, LPS, L posterior tibialis    ROM:  Lumbar flexion   90/90, tightness lower back  Lumbar extension  30/30, mild lower back discomfort   Right Hip IR  45/45  Left Hip IR  34/45  Right Hip ER  53/60  Left  hip ER  58/60  Ankle DF limited on L in talar neutral     MMT:  Left glute med 4/5 with no pain  Right glute med 5/5 with no pain  Left TFL 5/5 with lateral hip discomfort   Right TFL 5/5 with no pain  Ankle eversion 5/5 with no pain     MET:  Left short leg    Squat:  Shift to left  Foot flare to right  Arm drop on left  B foot collapse     Lunge:  Left knee genu valgum  Leftt knee cross over     NAL:  Restricted SI on left side  Restricted talus L    Kemps (left lower back discomfort), SLR (-)    Assessment:  NAL with associated myofascitis and weakness  Posterior tibialis dysfunction, hip pain     Plan:  Patient tolerated treatment well today  Treatment Time: 45 minutes   41480 manipulation 1-2 segments: MET, Hip LAD  67178 manipulation: Manual extremity  71195 Manual therapy: (ART, Graston, Strain Counter Strain, Fascial Manipulation, Cupping) performed over area of L hip ER, L QL, L gastroc, L posterior tibialis  90423 therapeutic exercise (20 minutes):  Self ankle mulligan, short foot lateral band walks, toe yoga, single leg  balance on bosu ball, single leg hops on bosu ball, single leg hops and land off bosu ball, walking lunges with weight, single leg ball toss on trampoline, short foot activation exercises, alter G running: to be scheduled in [place], review of current PT exercises she is doing for back and ankle, made corrections as needed, standing hip hikes, bird dogs on stability ball, Mgill sit ups, clams shells, side lying hip rotation, standing QL stretch, single leg balance    Today: 4 way hip IR (will do 2 of them every other day), self hip mobility and traction with green rouge band, short foot activation, eccentric eversion with band, bottom leg hip ER, standing C stretch, single leg balance for for foot   Strapping: ankle mulligan   Next visit: continue to push out  Shock wave: 15/9 over left lateral hip area       Atif Holliday DC, MEd, ATC

## 2018-11-29 ENCOUNTER — THERAPY VISIT (OUTPATIENT)
Dept: CHIROPRACTIC MEDICINE | Facility: CLINIC | Age: 38
End: 2018-11-29
Payer: COMMERCIAL

## 2018-11-29 DIAGNOSIS — M99.05 SOMATIC DYSFUNCTION OF PELVIS REGION: ICD-10-CM

## 2018-11-29 DIAGNOSIS — M25.572 PAIN IN JOINT, ANKLE AND FOOT, LEFT: ICD-10-CM

## 2018-11-29 DIAGNOSIS — M79.10 MYALGIA: ICD-10-CM

## 2018-11-29 DIAGNOSIS — M25.552 HIP PAIN, LEFT: ICD-10-CM

## 2018-11-29 PROCEDURE — 98940 CHIROPRACT MANJ 1-2 REGIONS: CPT | Mod: AT | Performed by: CHIROPRACTOR

## 2018-11-29 PROCEDURE — 97110 THERAPEUTIC EXERCISES: CPT | Performed by: CHIROPRACTOR

## 2018-12-01 NOTE — PROGRESS NOTES
[facility]      Subjective:  Valencia Roy   female  Denies any changes in medications   CC:  general lower back L>R, L posterior hip, L foot  Visit: 30  Goal: run over 60 minutes without hip and back pain  Comes in today doing very well. Pleased with progress she is making. Left medial posterior tibialis is doing well with mild soreness post running. Notes that left lateral and posterior hip a little more sore this week. Notes that still much better and active care is going well. Denies any new issues. Still working with [employer] as well. Running is going very well. Denies any new changes in health history.     Objective:  Inspection:  No swelling  Normal Gait    Palpation:  No specific pain  Palpable soreness over left posterior hip, general lower back, L QL  Myofascitis 2/4 noted over  L hip ER, L QL, LPS, L posterior tibialis    ROM:  Lumbar flexion   90/90, tightness lower back  Lumbar extension  30/30, mild lower back discomfort   Right Hip IR  45/45  Left Hip IR  34/45  Right Hip ER  53/60  Left  hip ER  58/60  Ankle DF limited on L in talar neutral     MMT:  Left glute med 4/5 with no pain  Right glute med 5/5 with no pain  Left TFL 5/5 with lateral hip discomfort   Right TFL 5/5 with no pain  Ankle eversion 5/5 with no pain     MET:  Left short leg    Squat:  Shift to left  Foot flare to right  Arm drop on left  B foot collapse     Lunge:  Left knee genu valgum  Leftt knee cross over     NAL:  Restricted SI on left side  Restricted talus L    Kemps (left lower back discomfort), SLR (-)    Assessment:  NAL with associated myofascitis and weakness  Posterior tibialis dysfunction, hip pain     Plan:  Patient tolerated treatment well today  Treatment Time: 45 minutes   26276 manipulation 1-2 segments: MET, Hip LAD  35340 manipulation: Manual extremity  86336 Manual therapy: (ART, Graston, Strain Counter Strain, Fascial Manipulation, Cupping) performed over area of L hip ER, L QL, L  gastroc, L posterior tibialis  67462 therapeutic exercise (20 minutes):  Self ankle mulligan, short foot lateral band walks, toe yoga, single leg balance on bosu ball, single leg hops on bosu ball, single leg hops and land off bosu ball, walking lunges with weight, single leg ball toss on trampoline, short foot activation exercises, alter G running: to be scheduled in [place], review of current PT exercises she is doing for back and ankle, made corrections as needed, standing hip hikes, bird dogs on stability ball, Mgill sit ups, clams shells, side lying hip rotation, standing QL stretch, single leg balance    Today: 4 way hip IR (will do 2 of them every other day), self hip mobility and traction with green rouge band, short foot activation, eccentric eversion with band, bottom leg hip ER, standing C stretch, single leg balance for for foot   Strapping: ankle mulligan   Next visit: continue to push out  Shock wave: 15/9 over left lateral hip area       Atif Holliday DC, MEd, ATC

## 2019-01-07 ENCOUNTER — THERAPY VISIT (OUTPATIENT)
Dept: CHIROPRACTIC MEDICINE | Facility: CLINIC | Age: 39
End: 2019-01-07
Payer: COMMERCIAL

## 2019-01-07 DIAGNOSIS — M25.572 PAIN IN JOINT, ANKLE AND FOOT, LEFT: ICD-10-CM

## 2019-01-07 DIAGNOSIS — M25.552 HIP PAIN, LEFT: ICD-10-CM

## 2019-01-07 DIAGNOSIS — M99.05 SOMATIC DYSFUNCTION OF PELVIS REGION: ICD-10-CM

## 2019-01-07 DIAGNOSIS — M79.10 MYALGIA: ICD-10-CM

## 2019-01-07 PROCEDURE — 98940 CHIROPRACT MANJ 1-2 REGIONS: CPT | Mod: AT | Performed by: CHIROPRACTOR

## 2019-01-07 PROCEDURE — 97110 THERAPEUTIC EXERCISES: CPT | Performed by: CHIROPRACTOR

## 2019-01-10 NOTE — PROGRESS NOTES
[facility]      Subjective:  Valencia Roy   female  Denies any changes in medications   CC:  general lower back L>R, L posterior hip, L foot  Visit: 31  Goal: run over 60 minutes without hip and back pain  Comes in today doing very well. Pleased with progress she is making. Left medial posterior tibialis is doing well with mild soreness post running.Notes slight increase because of the cold weather. Notes she is able to keep pain minimal with active care program. Left lateral hip as been OK. Notes felt good after last session. Notes does get sore with sitting for long periods and also has some stiffness in morning. Notes active care going well and notes Pilates is really helping as well. Denies any new issues.     Objective:  Inspection:  No swelling  Normal Gait    Palpation:  No specific pain  Palpable soreness over left posterior hip, general lower back, L QL  Myofascitis 2/4 noted over  L hip ER, L QL, LPS, L posterior tibialis    ROM:  Lumbar flexion   90/90, tightness lower back  Lumbar extension  30/30, mild lower back discomfort   Right Hip IR  45/45  Left Hip IR  34/45  Right Hip ER  53/60  Left  hip ER  58/60  Ankle DF limited on L in talar neutral     MMT:  Left glute med 4/5 with no pain  Right glute med 5/5 with no pain  Left TFL 5/5 with lateral hip discomfort   Right TFL 5/5 with no pain  Ankle eversion 5/5 with no pain     MET:  Left short leg    Squat:  Shift to left  Foot flare to right  Arm drop on left  B foot collapse     Lunge:  Left knee genu valgum  Leftt knee cross over     NAL:  Restricted SI on left side  Restricted talus L    Kemps (left lower back discomfort), SLR (-)    Assessment:  NAL with associated myofascitis and weakness  Posterior tibialis dysfunction, hip pain     Plan:  Patient tolerated treatment well today  Treatment Time: 45 minutes   20472 manipulation 1-2 segments: MET, Hip LAD  10619 manipulation: Manual extremity  37393 Manual therapy: (ART,  Sangeetha, Strain Counter Strain, Fascial Manipulation, Cupping) performed over area of L hip ER, L QL, L gastroc, L posterior tibialis  81999 therapeutic exercise (20 minutes):  Self ankle mulligan, short foot lateral band walks, toe yoga, single leg balance on bosu ball, single leg hops on bosu ball, single leg hops and land off bosu ball, walking lunges with weight, single leg ball toss on trampoline, short foot activation exercises, alter G running: to be scheduled in [place], review of current PT exercises she is doing for back and ankle, made corrections as needed, standing hip hikes, bird dogs on stability ball, Mgill sit ups, clams shells, side lying hip rotation, standing QL stretch, single leg balance    Today: 4 way hip IR (will do 2 of them every other day), self hip mobility and traction with green rouge band, short foot activation, eccentric eversion with band, bottom leg hip ER, standing C stretch, single leg balance for for foot, standing fire hydrant   Strapping: ankle mulligan   Next visit: continue to push out  Shock wave: 15/9 over left lateral hip area, 10/7 over left medial ankle  Dry Needle: 6 over left lateral hpi       Atif Holliday DC, MEd, ATC

## 2019-02-06 ENCOUNTER — THERAPY VISIT (OUTPATIENT)
Dept: CHIROPRACTIC MEDICINE | Facility: CLINIC | Age: 39
End: 2019-02-06
Payer: COMMERCIAL

## 2019-02-06 DIAGNOSIS — M25.552 HIP PAIN, LEFT: ICD-10-CM

## 2019-02-06 DIAGNOSIS — M99.05 SOMATIC DYSFUNCTION OF PELVIS REGION: ICD-10-CM

## 2019-02-06 DIAGNOSIS — M79.10 MYALGIA: ICD-10-CM

## 2019-02-06 DIAGNOSIS — M25.572 PAIN IN JOINT, ANKLE AND FOOT, LEFT: ICD-10-CM

## 2019-02-06 PROCEDURE — 98940 CHIROPRACT MANJ 1-2 REGIONS: CPT | Mod: AT | Performed by: CHIROPRACTOR

## 2019-02-06 PROCEDURE — 97110 THERAPEUTIC EXERCISES: CPT | Performed by: CHIROPRACTOR

## 2019-02-08 NOTE — PROGRESS NOTES
[facility]      Subjective:  Valencia Roy   female  Denies any changes in medications   CC:  general lower back L>R, L posterior hip, L foot  Visit: 31  Goal: run over 60 minutes without hip and back pain  Comes in today doing very well. Has been feeling very good since last visit and pleased with progress. Notes that back is best it has been in long time. Still has left hip and lower back tightness but able to stay fully active and use active care to keep it minimal. Did note today that she is pregnant so we discussed shock wave and thought might not be best to do it in back and hip just because we don't have any data on that. Notes left foot is good, but has not been running as much due to morning sickness. Able to push treatments out further and further.     Objective:  Inspection:  No swelling  Normal Gait    Palpation:  No specific pain  Palpable soreness over left posterior hip, general lower back, L QL  Myofascitis 2/4 noted over  L hip ER, L QL, LPS, L posterior tibialis    ROM:  Lumbar flexion   90/90, tightness lower back  Lumbar extension  30/30, mild lower back discomfort   Right Hip IR  45/45  Left Hip IR  34/45  Right Hip ER  53/60  Left  hip ER  58/60  Ankle DF limited on L in talar neutral     MMT:  Left glute med 4/5 with no pain  Right glute med 5/5 with no pain  Left TFL 5/5 with lateral hip discomfort   Right TFL 5/5 with no pain  Ankle eversion 5/5 with no pain     MET:  Left short leg    Squat:  Shift to left  Foot flare to right  Arm drop on left  B foot collapse     Lunge:  Left knee genu valgum  Leftt knee cross over     NAL:  Restricted SI on left side  Restricted talus L    Kemps (left lower back discomfort), SLR (-)    Assessment:  NAL with associated myofascitis and weakness  Posterior tibialis dysfunction, hip pain     Plan:  Patient tolerated treatment well today  Treatment Time: 45 minutes   81880 manipulation 1-2 segments: MET, Hip LAD  76507 manipulation:  Manual extremity  37629 Manual therapy: (ART, Graston, Strain Counter Strain, Fascial Manipulation, Cupping) performed over area of L hip ER, L QL, L gastroc, L posterior tibialis  24497 therapeutic exercise (20 minutes):  Self ankle mulligan, short foot lateral band walks, toe yoga, single leg balance on bosu ball, single leg hops on bosu ball, single leg hops and land off bosu ball, walking lunges with weight, single leg ball toss on trampoline, short foot activation exercises, alter G running: to be scheduled in [place], review of current PT exercises she is doing for back and ankle, made corrections as needed, standing hip hikes, bird dogs on stability ball, Mgill sit ups, clams shells, side lying hip rotation, standing QL stretch, single leg balance    Today: 4 way hip IR (will do 2 of them every other day), self hip mobility and traction with green rouge band, short foot activation, eccentric eversion with band, bottom leg hip ER, standing C stretch, single leg balance for for foot, standing fire hydrant   Strapping: ankle alex   Next visit: continue to push out  Dry Needle: 6 over left lateral hip      Atif Holliday DC, MEd, ATC

## 2019-03-18 ENCOUNTER — THERAPY VISIT (OUTPATIENT)
Dept: CHIROPRACTIC MEDICINE | Facility: CLINIC | Age: 39
End: 2019-03-18
Payer: COMMERCIAL

## 2019-03-18 DIAGNOSIS — M25.572 PAIN IN JOINT, ANKLE AND FOOT, LEFT: ICD-10-CM

## 2019-03-18 DIAGNOSIS — M99.05 SOMATIC DYSFUNCTION OF PELVIS REGION: Primary | ICD-10-CM

## 2019-03-18 DIAGNOSIS — M25.552 HIP PAIN, LEFT: ICD-10-CM

## 2019-03-18 DIAGNOSIS — M79.10 MYALGIA: ICD-10-CM

## 2019-03-18 PROCEDURE — 98940 CHIROPRACT MANJ 1-2 REGIONS: CPT | Mod: AT | Performed by: CHIROPRACTOR

## 2019-03-18 PROCEDURE — 97110 THERAPEUTIC EXERCISES: CPT | Performed by: CHIROPRACTOR

## 2019-03-18 NOTE — PROGRESS NOTES
[facility]      Subjective:  Valencia Roy   female  Denies any changes in medications   CC:  general lower back L>R, L posterior hip, L foot  Visit: 32  Goal: run over 60 minutes without hip and back pain  Comes in today doing very well. Has been feeling very good since last visit and pleased with progress. Notes that back is best it has been in long time. Still has left hip and lower back tightness but able to stay fully active and use active care to keep it minimal. Notes pregnancy is going well. Still working active care program and very pleased with progress. Able to continue to push treatments out with active care.     Objective:  Inspection:  No swelling  Normal Gait    Palpation:  No specific pain  Palpable soreness over left posterior hip, general lower back, L QL  Myofascitis 2/4 noted over  L hip ER, L QL, LPS, L posterior tibialis    ROM:  Lumbar flexion   90/90, tightness lower back  Lumbar extension  30/30, mild lower back discomfort   Right Hip IR  45/45  Left Hip IR  34/45  Right Hip ER  53/60  Left  hip ER  58/60  Ankle DF limited on L in talar neutral     MMT:  Left glute med 4/5 with no pain  Right glute med 5/5 with no pain  Left TFL 5/5 with lateral hip discomfort   Right TFL 5/5 with no pain  Ankle eversion 5/5 with no pain     MET:  Left short leg    Squat:  Shift to left  Foot flare to right  Arm drop on left  B foot collapse     Lunge:  Left knee genu valgum  Leftt knee cross over     NAL:  Restricted SI on left side  Restricted talus L    Kemps (left lower back discomfort), SLR (-)    Assessment:  NAL with associated myofascitis and weakness  Posterior tibialis dysfunction, hip pain     Plan:  Patient tolerated treatment well today  Treatment Time: 45 minutes   30745 manipulation 1-2 segments: MET, Hip LAD  06666 manipulation: Manual extremity  01942 Manual therapy: (ART, Graston, Strain Counter Strain, Fascial Manipulation, Cupping) performed over area of L hip ER,  L QL, L gastroc, L posterior tibialis  86108 therapeutic exercise (20 minutes):  Self ankle mulligan, short foot lateral band walks, toe yoga, single leg balance on bosu ball, single leg hops on bosu ball, single leg hops and land off bosu ball, walking lunges with weight, single leg ball toss on trampoline, short foot activation exercises, alter G running: to be scheduled in [place], review of current PT exercises she is doing for back and ankle, made corrections as needed, standing hip hikes, bird dogs on stability ball, Mgill sit ups, clams shells, side lying hip rotation, standing QL stretch, single leg balance    Today: 4 way hip IR (will do 2 of them every other day), self hip mobility and traction with green rouge band, short foot activation, eccentric eversion with band, bottom leg hip ER, standing C stretch, single leg balance for for foot, standing fire hydrant   Strapping: ankle mulligan   Next visit: continue to push out  Dry Needle: 6 over left lateral hip      Atif Holliday DC, MEd, ATC

## 2019-06-10 ENCOUNTER — THERAPY VISIT (OUTPATIENT)
Dept: CHIROPRACTIC MEDICINE | Facility: CLINIC | Age: 39
End: 2019-06-10
Payer: COMMERCIAL

## 2019-06-10 DIAGNOSIS — M79.10 MYALGIA: ICD-10-CM

## 2019-06-10 DIAGNOSIS — M25.552 HIP PAIN, LEFT: ICD-10-CM

## 2019-06-10 DIAGNOSIS — M25.572 PAIN IN JOINT, ANKLE AND FOOT, LEFT: ICD-10-CM

## 2019-06-10 DIAGNOSIS — M99.05 SOMATIC DYSFUNCTION OF PELVIS REGION: Primary | ICD-10-CM

## 2019-06-10 PROCEDURE — 97110 THERAPEUTIC EXERCISES: CPT | Performed by: CHIROPRACTOR

## 2019-06-10 PROCEDURE — 98940 CHIROPRACT MANJ 1-2 REGIONS: CPT | Mod: AT | Performed by: CHIROPRACTOR

## 2019-06-10 NOTE — PROGRESS NOTES
[facility]      Subjective:  Valencia Roy   female  Denies any changes in medications   CC:  general lower back L>R, L posterior hip, L foot  Visit: 33  Goal: run over 60 minutes without hip and back pain  Comes in today doing very well. Is 6 months pregnant and feeling good. Notes has not been running but very active. Still working [employer]. Did have some nausea but that is better as of late. Notes continued left lower back pain without radiation. Foot is feeling good. Denies any new changes. Most pain with sitting or standing for long periods. Pain 3/10 on average.     Objective:  Inspection:  No swelling  Normal Gait    Palpation:  No specific pain  Palpable soreness over left posterior hip, general lower back, L QL  Myofascitis 2/4 noted over  L hip ER, L QL, LPS, L posterior tibialis    ROM:  Lumbar flexion   90/90, tightness lower back  Lumbar extension  30/30, mild lower back discomfort   Right Hip IR  45/45  Left Hip IR  34/45  Right Hip ER  53/60  Left  hip ER  58/60  Ankle DF limited on L in talar neutral     MMT:  Left glute med 4/5 with no pain  Right glute med 5/5 with no pain  Left TFL 5/5 with lateral hip discomfort   Right TFL 5/5 with no pain  Ankle eversion 5/5 with no pain     MET:  Left short leg    Squat:  Shift to left  Foot flare to right  Arm drop on left  B foot collapse     Lunge:  Left knee genu valgum  Leftt knee cross over     NAL:  Restricted SI on left side  Restricted talus L    Kemps (left lower back discomfort), SLR (-)    Assessment:  NAL with associated myofascitis and weakness  Posterior tibialis dysfunction, hip pain     Plan:  Patient tolerated treatment well today  Treatment Time: 45 minutes   56678 manipulation 1-2 segments: MET, Hip LAD  70069 manipulation: Manual extremity  77662 Manual therapy: (ART, Graston, Strain Counter Strain, Fascial Manipulation, Cupping) performed over area of L hip ER, L QL, L gastroc, L posterior tibialis  31065  therapeutic exercise (20 minutes):  Self ankle mulligan, short foot lateral band walks, toe yoga, single leg balance on bosu ball, single leg hops on bosu ball, single leg hops and land off bosu ball, walking lunges with weight, single leg ball toss on trampoline, short foot activation exercises, alter G running: to be scheduled in [place], review of current PT exercises she is doing for back and ankle, made corrections as needed, standing hip hikes, bird dogs on stability ball, Mgill sit ups, clams shells, side lying hip rotation, standing QL stretch, single leg balance    Today: 4 way hip IR (will do 2 of them every other day), self hip mobility and traction with green rouge band, short foot activation, eccentric eversion with band, bottom leg hip ER, standing C stretch, single leg balance for for foot, standing fire hydrant   Strapping: ankle mulligan   Next visit: continue to push out  Dry Needle: 6 over left lateral hip      Atif Holliday DC, MEd, ATC

## 2019-07-08 ENCOUNTER — THERAPY VISIT (OUTPATIENT)
Dept: CHIROPRACTIC MEDICINE | Facility: CLINIC | Age: 39
End: 2019-07-08
Payer: COMMERCIAL

## 2019-07-08 DIAGNOSIS — M99.05 SOMATIC DYSFUNCTION OF PELVIS REGION: Primary | ICD-10-CM

## 2019-07-08 DIAGNOSIS — M25.552 HIP PAIN, LEFT: ICD-10-CM

## 2019-07-08 DIAGNOSIS — M25.572 PAIN IN JOINT, ANKLE AND FOOT, LEFT: ICD-10-CM

## 2019-07-08 DIAGNOSIS — M79.10 MYALGIA: ICD-10-CM

## 2019-07-08 PROCEDURE — 98940 CHIROPRACT MANJ 1-2 REGIONS: CPT | Mod: AT | Performed by: CHIROPRACTOR

## 2019-07-08 PROCEDURE — 97110 THERAPEUTIC EXERCISES: CPT | Performed by: CHIROPRACTOR

## 2019-07-09 NOTE — PROGRESS NOTES
[facility]      Subjective:  Valencia Roy   female  Denies any changes in medications   CC:  general lower back L>R, L posterior hip, L foot  Visit: 34  Goal: run over 60 minutes without hip and back pain  Comes in today doing very well. Last session was helpful. Sore in lower back from pregnancy. Staying active and playing tennis. Notes continues left lower back pain. Notes foot and ankle feel good as has not been running. Active care going well. Working with Pilates as well. Denies any new changes.     Objective:  Inspection:  No swelling  Normal Gait    Palpation:  No specific pain  Palpable soreness over left posterior hip, general lower back, L QL  Myofascitis 2/4 noted over  L hip ER, L QL, LPS, L posterior tibialis    ROM:  Lumbar flexion   90/90, tightness lower back  Lumbar extension  30/30, mild lower back discomfort   Right Hip IR  45/45  Left Hip IR  34/45  Right Hip ER  53/60  Left  hip ER  58/60  Ankle DF limited on L in talar neutral     MMT:  Left glute med 4/5 with no pain  Right glute med 5/5 with no pain  Left TFL 5/5 with lateral hip discomfort   Right TFL 5/5 with no pain  Ankle eversion 5/5 with no pain     MET:  Left short leg    Squat:  Shift to left  Foot flare to right  Arm drop on left  B foot collapse     Lunge:  Left knee genu valgum  Leftt knee cross over     NAL:  Restricted SI on left side  Restricted talus L    Kemps (left lower back discomfort), SLR (-)    Assessment:  NAL with associated myofascitis and weakness  Posterior tibialis dysfunction, hip pain     Plan:  Patient tolerated treatment well today  Treatment Time: 45 minutes   42753 manipulation 1-2 segments: MET, Hip LAD  64181 manipulation: Manual extremity  21255 Manual therapy: (ART, Graston, Strain Counter Strain, Fascial Manipulation, Cupping) performed over area of L hip ER, L QL, L gastroc, L posterior tibialis  61623 therapeutic exercise (20 minutes):  Self ankle mulligan, short foot lateral  band walks, toe yoga, single leg balance on bosu ball, single leg hops on bosu ball, single leg hops and land off bosu ball, walking lunges with weight, single leg ball toss on trampoline, short foot activation exercises, alter G running: to be scheduled in [place], review of current PT exercises she is doing for back and ankle, made corrections as needed, standing hip hikes, bird dogs on stability ball, Mgill sit ups, clams shells, side lying hip rotation, standing QL stretch, single leg balance    Today: 4 way hip IR (will do 2 of them every other day), self hip mobility and traction with green rouge band, short foot activation, eccentric eversion with band, bottom leg hip ER, standing C stretch, single leg balance for for foot, standing fire hydrant   Next visit: continue to push out  Dry Needle: 6 over left lateral hip      Atif Holliday DC, MEd, ATC

## 2019-07-31 ENCOUNTER — THERAPY VISIT (OUTPATIENT)
Dept: CHIROPRACTIC MEDICINE | Facility: CLINIC | Age: 39
End: 2019-07-31
Payer: COMMERCIAL

## 2019-07-31 DIAGNOSIS — M79.10 MYALGIA: ICD-10-CM

## 2019-07-31 DIAGNOSIS — M99.05 SOMATIC DYSFUNCTION OF PELVIS REGION: Primary | ICD-10-CM

## 2019-07-31 DIAGNOSIS — M25.572 PAIN IN JOINT, ANKLE AND FOOT, LEFT: ICD-10-CM

## 2019-07-31 DIAGNOSIS — M25.552 HIP PAIN, LEFT: ICD-10-CM

## 2019-07-31 PROCEDURE — 97110 THERAPEUTIC EXERCISES: CPT | Performed by: CHIROPRACTOR

## 2019-07-31 PROCEDURE — 98940 CHIROPRACT MANJ 1-2 REGIONS: CPT | Mod: AT | Performed by: CHIROPRACTOR

## 2019-08-01 NOTE — PROGRESS NOTES
[facility]      Subjective:  Valencia Roy   female  Denies any changes in medications   CC:  general lower back L>R, L posterior hip, L foot  Visit: 35  Goal: run over 60 minutes without hip and back pain  Comes in today doing very well. Last session was helpful. Sore in lower back from pregnancy. Staying active and playing tennis. Notes continues left lower back pain. Notes foot and ankle feel good as has not been running. Active care going well. Last treatment was very helpful. Denies any new issues. Pleased with progress.     Objective:  Inspection:  No swelling  Normal Gait    Palpation:  No specific pain  Palpable soreness over left posterior hip, general lower back, L QL  Myofascitis 2/4 noted over  L hip ER, L QL, LPS, L posterior tibialis    ROM:  Lumbar flexion   90/90, tightness lower back  Lumbar extension  30/30, mild lower back discomfort   Right Hip IR  45/45  Left Hip IR  34/45  Right Hip ER  53/60  Left  hip ER  58/60  Ankle DF limited on L in talar neutral     MMT:  Left glute med 4/5 with no pain  Right glute med 5/5 with no pain  Left TFL 5/5 with lateral hip discomfort   Right TFL 5/5 with no pain  Ankle eversion 5/5 with no pain     MET:  Left short leg    Squat:  Shift to left  Foot flare to right  Arm drop on left  B foot collapse     Lunge:  Left knee genu valgum  Leftt knee cross over     NAL:  Restricted SI on left side  Restricted talus L    Kemps (left lower back discomfort), SLR (-)    Assessment:  NAL with associated myofascitis and weakness  Posterior tibialis dysfunction, hip pain     Plan:  Patient tolerated treatment well today  Treatment Time: 45 minutes   18806 manipulation 1-2 segments: MET, Hip LAD  10533 manipulation: Manual extremity  02442 Manual therapy: (ART, Graston, Strain Counter Strain, Fascial Manipulation, Cupping) performed over area of L hip ER, L QL, L gastroc, L posterior tibialis  65182 therapeutic exercise (20 minutes):  Self ankle  mulligan, short foot lateral band walks, toe yoga, single leg balance on bosu ball, single leg hops on bosu ball, single leg hops and land off bosu ball, walking lunges with weight, single leg ball toss on trampoline, short foot activation exercises, alter G running: to be scheduled in [place], review of current PT exercises she is doing for back and ankle, made corrections as needed, standing hip hikes, bird dogs on stability ball, Mgill sit ups, clams shells, side lying hip rotation, standing QL stretch, single leg balance    Today: 4 way hip IR (will do 2 of them every other day), self hip mobility and traction with green rouge band, short foot activation, eccentric eversion with band, bottom leg hip ER, standing C stretch, single leg balance for for foot, standing fire hydrant   Next visit: continue to push out  Dry Needle: 6 over left lateral hip      Atif Holliday DC, MEd, ATC

## 2020-03-10 ENCOUNTER — HEALTH MAINTENANCE LETTER (OUTPATIENT)
Age: 40
End: 2020-03-10

## 2020-06-04 ENCOUNTER — THERAPY VISIT (OUTPATIENT)
Dept: CHIROPRACTIC MEDICINE | Facility: CLINIC | Age: 40
End: 2020-06-04
Payer: COMMERCIAL

## 2020-06-04 DIAGNOSIS — M25.572 PAIN IN JOINT, ANKLE AND FOOT, LEFT: ICD-10-CM

## 2020-06-04 DIAGNOSIS — M22.2X1 PATELLOFEMORAL PAIN SYNDROME OF BOTH KNEES: ICD-10-CM

## 2020-06-04 DIAGNOSIS — M79.10 MYALGIA: ICD-10-CM

## 2020-06-04 DIAGNOSIS — M22.2X2 PATELLOFEMORAL PAIN SYNDROME OF BOTH KNEES: ICD-10-CM

## 2020-06-04 DIAGNOSIS — M99.05 SOMATIC DYSFUNCTION OF PELVIS REGION: Primary | ICD-10-CM

## 2020-06-04 DIAGNOSIS — M25.552 HIP PAIN, LEFT: ICD-10-CM

## 2020-06-04 PROCEDURE — 98940 CHIROPRACT MANJ 1-2 REGIONS: CPT | Mod: AT | Performed by: CHIROPRACTOR

## 2020-06-04 PROCEDURE — 99212 OFFICE O/P EST SF 10 MIN: CPT | Mod: 25 | Performed by: CHIROPRACTOR

## 2020-06-04 NOTE — PROGRESS NOTES
[facility]      Subjective:  Valencia Roy   Denies any changes in medications   CC:  B knees, L medial ankle   Visit: 1 (re-exam)  Goal: run over 30 minutes without hip and back pain  Comes in today doing very well. Since last session had her baby. She has been working at getting back into running. Has been doing some core work on own, but does notes some diastasis recti and I talked with her about working with PT for that. She said she would consider. She is in for 3 week pain in B anterior knees. Pain 3/10 and with going up and down stairs. No swelling. No YOLANDA. Gradual onset of pain. Worse with running. Also notes left hip is still a little sore as well as left ankle. Denies any radiating pain.     Objective:  Inspection:  No swelling  Normal Gait    Palpation:  No specific pain  Palpable soreness over left posterior hip, general lower back, B anterior knees  Myofascitis 2/4 noted over  B distal quads,  LPS, L posterior tibialis    ROM:  Lumbar flexion   90/90, tightness lower back  Lumbar extension  30/30, mild lower back discomfort   Right Hip IR  45/45  Left Hip IR  34/45  Right Hip ER  53/60  Left  hip ER  58/60  Ankle DF limited on L in talar neutral   Knee flexion full with crepitus noted B  Knee extension full and pain free B    MMT:  Left glute med 4/5 with no pain  Right glute med 4/5 with no pain  Left TFL 4/5 with lateral hip discomfort   Right TFL 4/5 with no pain  Ankle eversion 5/5 with no pain   Quads 5/5 B    MET:  Left short leg    Squat:  Shift to left  Foot flare to right  Arm drop on left  B foot collapse     Lunge:  Left knee genu valgum  Leftt knee cross over     NAL:  Restricted SI on left side  Restricted talus L    Kemps (left lower back discomfort), SLR (-)    Assessment:  NAL with associated myofascitis and weakness  Posterior tibialis dysfunction, hip pain, B patella femoral pain     Plan:  Patient tolerated treatment well today  Treatment Time: 45 minutes    60931 manipulation 1-2 segments: MET, Hip LAD  83554 manipulation: Manual extremity  24362 Manual therapy: (ART, Graston, Strain Counter Strain, Fascial Manipulation, Cupping) performed over area of B quads  26666 therapeutic exercise (20 minutes):  Self ankle mulligan, short foot lateral band walks, toe yoga, single leg balance on bosu ball, single leg hops on bosu ball, single leg hops and land off bosu ball, walking lunges with weight, single leg ball toss on trampoline, short foot activation exercises, alter G running: to be scheduled in [place], review of current PT exercises she is doing for back and ankle, made corrections as needed, standing hip hikes, bird dogs on stability ball, Mgill sit ups, clams shells, side lying hip rotation, standing QL stretch, single leg balance, 4 way hip IR (will do 2 of them every other day), self hip mobility and traction with green rouge band, short foot activation, eccentric eversion with band, bottom leg hip ER, standing C stretch, single leg balance for for foot, standing fire hydrant   Today: clam shells, band walks, single leg bridges, standing fire hydrant, single leg step down  Shock wave: 10/6 over left ankle  Tape: general knee tracking  Next visit: 2 weeks  Dry Needle: 6 over left lateral hip (did not do)      Atif Holliday DC, MEd, ATC

## 2020-06-18 ENCOUNTER — THERAPY VISIT (OUTPATIENT)
Dept: CHIROPRACTIC MEDICINE | Facility: CLINIC | Age: 40
End: 2020-06-18
Payer: COMMERCIAL

## 2020-06-18 DIAGNOSIS — M22.2X2 PATELLOFEMORAL PAIN SYNDROME OF BOTH KNEES: ICD-10-CM

## 2020-06-18 DIAGNOSIS — M25.552 HIP PAIN, LEFT: ICD-10-CM

## 2020-06-18 DIAGNOSIS — M22.2X1 PATELLOFEMORAL PAIN SYNDROME OF BOTH KNEES: ICD-10-CM

## 2020-06-18 DIAGNOSIS — M79.10 MYALGIA: ICD-10-CM

## 2020-06-18 DIAGNOSIS — M99.05 SOMATIC DYSFUNCTION OF PELVIS REGION: Primary | ICD-10-CM

## 2020-06-18 PROCEDURE — 98940 CHIROPRACT MANJ 1-2 REGIONS: CPT | Mod: AT | Performed by: CHIROPRACTOR

## 2020-06-18 PROCEDURE — 97110 THERAPEUTIC EXERCISES: CPT | Performed by: CHIROPRACTOR

## 2020-06-18 NOTE — PROGRESS NOTES
[facility]      Subjective:  Valencia Roy   Denies any changes in medications   CC:  B knees, L medial ankle   Visit: 2  Goal: run over 30 minutes without hip and back pain  Comes in today doing very well. Since last session had her baby. She has been working at getting back into running. Has been doing some core work on own, but does notes some diastasis recti and I talked with her about working with PT for that. She said she would consider. She is in for 3 week pain in B anterior knees. Pain 3/10 and with going up and down stairs. No swelling. No YOLANDA. Gradual onset of pain. Worse with running. Also notes left hip is still a little sore as well as left ankle. Denies any radiating pain.    Comes in today doing OK. Notes working on exercises Ran every other day. No swelling. Feels about the same.      Objective:  Inspection:  No swelling  Normal Gait    Palpation:  No specific pain  Palpable soreness over left posterior hip, general lower back, B anterior knees  Myofascitis 2/4 noted over  B distal quads,  LPS, L posterior tibialis    ROM:  Lumbar flexion   90/90, tightness lower back  Lumbar extension  30/30, mild lower back discomfort   Right Hip IR  45/45  Left Hip IR  34/45  Right Hip ER  53/60  Left  hip ER  58/60  Ankle DF limited on L in talar neutral   Knee flexion full with crepitus noted B  Knee extension full and pain free B    MMT:  Left glute med 4/5 with no pain  Right glute med 4/5 with no pain  Left TFL 4/5 with lateral hip discomfort   Right TFL 4/5 with no pain  Ankle eversion 5/5 with no pain   Quads 5/5 B    MET:  Left short leg    Squat:  Shift to left  Foot flare to right  Arm drop on left  B foot collapse     Lunge:  Left knee genu valgum  Leftt knee cross over     NAL:  Restricted SI on left side  Restricted talus L    Kemps (left lower back discomfort), SLR (-)    Assessment:  NAL with associated myofascitis and weakness  Posterior tibialis dysfunction, hip pain, B  patella femoral pain     Plan:  Patient tolerated treatment well today  Treatment Time: 45 minutes   19857 manipulation 1-2 segments: MET, Hip LAD  34138 manipulation: Manual extremity  93672 Manual therapy: (ART, Graston, Strain Counter Strain, Fascial Manipulation, Cupping) performed over area of B quads  91997 therapeutic exercise (20 minutes):  Self ankle mulligan, short foot lateral band walks, toe yoga, single leg balance on bosu ball, single leg hops on bosu ball, single leg hops and land off bosu ball, walking lunges with weight, single leg ball toss on trampoline, short foot activation exercises, alter G running: to be scheduled in [place], review of current PT exercises she is doing for back and ankle, made corrections as needed, standing hip hikes, bird dogs on stability ball, Mgill sit ups, clams shells, side lying hip rotation, standing QL stretch, single leg balance, 4 way hip IR (will do 2 of them every other day), self hip mobility and traction with green rouge band, short foot activation, eccentric eversion with band, bottom leg hip ER, standing C stretch, single leg balance for for foot, standing fire hydrant   Today: clam shells, band walks, single leg bridges, standing fire hydrant, single leg step down  Shock wave: 10/6 over left ankle  Tape: general knee tracking  Next visit: 2 weeks  Dry Needle: 6 over left lateral hip (did not do)      Atif Holliday DC, MEd, ATC

## 2020-06-30 ENCOUNTER — THERAPY VISIT (OUTPATIENT)
Dept: CHIROPRACTIC MEDICINE | Facility: CLINIC | Age: 40
End: 2020-06-30
Payer: COMMERCIAL

## 2020-06-30 DIAGNOSIS — M99.05 SOMATIC DYSFUNCTION OF PELVIS REGION: Primary | ICD-10-CM

## 2020-06-30 DIAGNOSIS — M22.2X2 PATELLOFEMORAL PAIN SYNDROME OF BOTH KNEES: ICD-10-CM

## 2020-06-30 DIAGNOSIS — M22.2X1 PATELLOFEMORAL PAIN SYNDROME OF BOTH KNEES: ICD-10-CM

## 2020-06-30 DIAGNOSIS — M25.552 HIP PAIN, LEFT: ICD-10-CM

## 2020-06-30 DIAGNOSIS — M79.10 MYALGIA: ICD-10-CM

## 2020-06-30 PROCEDURE — 98940 CHIROPRACT MANJ 1-2 REGIONS: CPT | Mod: AT | Performed by: CHIROPRACTOR

## 2020-06-30 PROCEDURE — 97110 THERAPEUTIC EXERCISES: CPT | Performed by: CHIROPRACTOR

## 2020-07-05 NOTE — PROGRESS NOTES
[facility]      Subjective:  Valencia Roy   Denies any changes in medications   CC:  B knees, L medial ankle   Visit: 3  Goal: run over 30 minutes without hip and back pain    Comes in today doing OK. Notes working on exercises Ran every other day. No swelling. Feels about the same but notes improvement. Notes knees more tight than painful. Notes back is about the same. Ankle is doing doing well. Denies any changes in health history.       Objective:  Inspection:  No swelling  Normal Gait    Palpation:  No specific pain  Palpable soreness over left posterior hip, general lower back, B anterior knees  Myofascitis 2/4 noted over  B distal quads,  LPS, L posterior tibialis    ROM:  Lumbar flexion   90/90, tightness lower back  Lumbar extension  30/30, mild lower back discomfort   Right Hip IR  45/45  Left Hip IR  34/45  Right Hip ER  53/60  Left  hip ER  58/60  Ankle DF limited on L in talar neutral   Knee flexion full with crepitus noted B  Knee extension full and pain free B    MMT:  Left glute med 4/5 with no pain  Right glute med 4/5 with no pain  Left TFL 4/5 with lateral hip discomfort   Right TFL 4/5 with no pain  Ankle eversion 5/5 with no pain   Quads 5/5 B    MET:  Left short leg    Squat:  Shift to left  Foot flare to right  Arm drop on left  B foot collapse     Lunge:  Left knee genu valgum  Leftt knee cross over     NAL:  Restricted SI on left side  Restricted talus L    Kemps (left lower back discomfort), SLR (-)    Assessment:  NAL with associated myofascitis and weakness  Posterior tibialis dysfunction, hip pain, B patella femoral pain     Plan:  Patient tolerated treatment well today  Treatment Time: 45 minutes   17970 manipulation 1-2 segments: MET, Hip LAD  05221 manipulation: Manual extremity  29051 Manual therapy: (ART, Graston, Strain Counter Strain, Fascial Manipulation, Cupping) performed over area of B quads  09201 therapeutic exercise (20 minutes):  Self ankle  mulligan, short foot lateral band walks, toe yoga, single leg balance on bosu ball, single leg hops on bosu ball, single leg hops and land off bosu ball, walking lunges with weight, single leg ball toss on trampoline, short foot activation exercises, alter G running: to be scheduled in [place], review of current PT exercises she is doing for back and ankle, made corrections as needed, standing hip hikes, bird dogs on stability ball, Mgill sit ups, clams shells, side lying hip rotation, standing QL stretch, single leg balance, 4 way hip IR (will do 2 of them every other day), self hip mobility and traction with green rouge band, short foot activation, eccentric eversion with band, bottom leg hip ER, standing C stretch, single leg balance for for foot, standing fire hydrant   Today: clam shells, band walks, single leg bridges, standing fire hydrant, single leg step down  Shock wave: 10/6 over left ankle  Tape: general knee tracking  Next visit: 2 weeks  Dry Needle: 6 over left lateral hip (did not do)      Atif Holliday DC, MEd, ATC

## 2020-07-22 ENCOUNTER — THERAPY VISIT (OUTPATIENT)
Dept: CHIROPRACTIC MEDICINE | Facility: CLINIC | Age: 40
End: 2020-07-22
Payer: COMMERCIAL

## 2020-07-22 DIAGNOSIS — M25.579 PAIN IN JOINT, ANKLE AND FOOT, UNSPECIFIED LATERALITY: ICD-10-CM

## 2020-07-22 DIAGNOSIS — M22.2X1 PATELLOFEMORAL PAIN SYNDROME OF BOTH KNEES: ICD-10-CM

## 2020-07-22 DIAGNOSIS — M22.2X2 PATELLOFEMORAL PAIN SYNDROME OF BOTH KNEES: ICD-10-CM

## 2020-07-22 DIAGNOSIS — M99.05 SOMATIC DYSFUNCTION OF PELVIS REGION: Primary | ICD-10-CM

## 2020-07-22 DIAGNOSIS — M79.10 MYALGIA: ICD-10-CM

## 2020-07-22 PROCEDURE — 97110 THERAPEUTIC EXERCISES: CPT | Performed by: CHIROPRACTOR

## 2020-07-22 PROCEDURE — 98940 CHIROPRACT MANJ 1-2 REGIONS: CPT | Mod: AT | Performed by: CHIROPRACTOR

## 2020-07-23 PROBLEM — M25.579 PAIN IN JOINT, ANKLE AND FOOT, UNSPECIFIED LATERALITY: Status: ACTIVE | Noted: 2020-07-23

## 2020-07-23 NOTE — PROGRESS NOTES
[facility]      Subjective:  Valencia Roy   Denies any changes in medications   CC:  B knees, L medial ankle   Visit: 4  Goal: run over 30 minutes without hip and back pain    Comes in today doing OK. Notes working on exercises Ran every other day. No swelling. Notes knees are making improvement. Notes has some B lateral foot pain non specific. We talked about how she might be supinating more with her shoes and super feet and to maybe try without superfeet. She agreed and will do very slow transition to see if it helps. She denies any new issues and is pleased with progress she is making.        Objective:  Inspection:  No swelling  Normal Gait    Palpation:  No specific pain  Palpable soreness over left posterior hip, general lower back, B anterior knees  Myofascitis 2/4 noted over  B distal quads,  LPS, L posterior tibialis    ROM:  Lumbar flexion   90/90, tightness lower back  Lumbar extension  30/30, mild lower back discomfort   Right Hip IR  45/45  Left Hip IR  34/45  Right Hip ER  53/60  Left  hip ER  58/60  Ankle DF limited on L in talar neutral   Knee flexion full with crepitus noted B  Knee extension full and pain free B    MMT:  Left glute med 4/5 with no pain  Right glute med 4/5 with no pain  Left TFL 4/5 with lateral hip discomfort   Right TFL 4/5 with no pain  Ankle eversion 5/5 with no pain   Quads 5/5 B    MET:  Left short leg    Squat:  Shift to left  Foot flare to right  Arm drop on left  B foot collapse     Lunge:  Left knee genu valgum  Leftt knee cross over     NAL:  Restricted SI on left side  Restricted talus L    Kemps (left lower back discomfort), SLR (-)    Assessment:  NAL with associated myofascitis and weakness  Posterior tibialis dysfunction, hip pain, B patella femoral pain     Plan:  Patient tolerated treatment well today  Treatment Time: 45 minutes   90789 manipulation 1-2 segments: MET, Hip LAD  63730 manipulation: Manual extremity  48483 Manual therapy:  (ART, Graston, Strain Counter Strain, Fascial Manipulation, Cupping) performed over area of B quads  92971 therapeutic exercise (20 minutes):  Self ankle mulligan, short foot lateral band walks, toe yoga, single leg balance on bosu ball, single leg hops on bosu ball, single leg hops and land off bosu ball, walking lunges with weight, single leg ball toss on trampoline, short foot activation exercises, alter G running: to be scheduled in [place], review of current PT exercises she is doing for back and ankle, made corrections as needed, standing hip hikes, bird dogs on stability ball, Mgill sit ups, clams shells, side lying hip rotation, standing QL stretch, single leg balance, 4 way hip IR (will do 2 of them every other day), self hip mobility and traction with green rouge band, short foot activation, eccentric eversion with band, bottom leg hip ER, standing C stretch, single leg balance for for foot, standing fire hydrant   Today: clam shells, band walks, single leg bridges, standing fire hydrant, single leg step down  Shock wave: 10/3 over B lateral foot  Tape: general knee tracking  Next visit: 2 weeks  Dry Needle: 6 over left lateral hip (did not do)      Atif Holliday DC, MEd, ATC

## 2020-08-07 ENCOUNTER — THERAPY VISIT (OUTPATIENT)
Dept: CHIROPRACTIC MEDICINE | Facility: CLINIC | Age: 40
End: 2020-08-07
Payer: COMMERCIAL

## 2020-08-07 DIAGNOSIS — M22.2X1 PATELLOFEMORAL PAIN SYNDROME OF BOTH KNEES: ICD-10-CM

## 2020-08-07 DIAGNOSIS — M22.2X2 PATELLOFEMORAL PAIN SYNDROME OF BOTH KNEES: ICD-10-CM

## 2020-08-07 DIAGNOSIS — M99.05 SOMATIC DYSFUNCTION OF PELVIS REGION: Primary | ICD-10-CM

## 2020-08-07 DIAGNOSIS — M25.579 PAIN IN JOINT, ANKLE AND FOOT, UNSPECIFIED LATERALITY: ICD-10-CM

## 2020-08-07 DIAGNOSIS — M79.10 MYALGIA: ICD-10-CM

## 2020-08-07 PROCEDURE — 98940 CHIROPRACT MANJ 1-2 REGIONS: CPT | Mod: AT | Performed by: CHIROPRACTOR

## 2020-08-07 PROCEDURE — 97110 THERAPEUTIC EXERCISES: CPT | Performed by: CHIROPRACTOR

## 2020-08-07 NOTE — PROGRESS NOTES
[facility]      Subjective:  Valencia Roy   Denies any changes in medications   CC:  B knees, L medial ankle   Visit: 5  Goal: run over 30 minutes without hip and back pain    Comes in today doing OK. Notes working on exercises Ran every other day. Notes took out super feet and it is helping with lateral feet. Notes more tightness than pain. Has some discomfort over left medial foot by navicular. Denies any swelling. Pleased with progress. Last session was helpful. Denies any new issues.     Objective:  Inspection:  No swelling  Normal Gait    Palpation:  No specific pain  Palpable soreness over left posterior hip, general lower back, B anterior knees  Myofascitis 2/4 noted over  B distal quads,  LPS, L posterior tibialis, L calf    ROM:  Lumbar flexion   90/90, tightness lower back  Lumbar extension  30/30, mild lower back discomfort   Right Hip IR  45/45  Left Hip IR  34/45  Right Hip ER  53/60  Left  hip ER  58/60  Ankle DF limited on L in talar neutral   Knee flexion full with crepitus noted B  Knee extension full and pain free B    MMT:  Left glute med 4/5 with no pain  Right glute med 4/5 with no pain  Left TFL 4/5 with lateral hip discomfort   Right TFL 4/5 with no pain  Ankle eversion 5/5 with no pain   Quads 5/5 B    MET:  Left short leg    Squat:  Shift to left  Foot flare to right  Arm drop on left  B foot collapse     Lunge:  Left knee genu valgum  Leftt knee cross over     NAL:  Restricted SI on left side  Restricted talus L    Kemps (left lower back discomfort), SLR (-)    Assessment:  NAL with associated myofascitis and weakness  Posterior tibialis dysfunction, hip pain, B patella femoral pain     Plan:  Patient tolerated treatment well today  Treatment Time: 45 minutes   39880 manipulation 1-2 segments: MET, Hip LAD  94972 manipulation: Manual extremity  97035 Manual therapy: (ART, Graston, Strain Counter Strain, Fascial Manipulation, Cupping) performed over area of B  quads  57013 therapeutic exercise (20 minutes):  Self ankle mulligan, short foot lateral band walks, toe yoga, single leg balance on bosu ball, single leg hops on bosu ball, single leg hops and land off bosu ball, walking lunges with weight, single leg ball toss on trampoline, short foot activation exercises, alter G running: to be scheduled in [place], review of current PT exercises she is doing for back and ankle, made corrections as needed, standing hip hikes, bird dogs on stability ball, Mgill sit ups, clams shells, side lying hip rotation, standing QL stretch, single leg balance, 4 way hip IR (will do 2 of them every other day), self hip mobility and traction with green rouge band, short foot activation, eccentric eversion with band, bottom leg hip ER, standing C stretch, single leg balance for for foot, standing fire hydrant   Today: clam shells, band walks, single leg bridges, standing fire hydrant, single leg step down  Shock wave: 10/3 over B lateral foot  Tape: general knee tracking  Next visit: 2 weeks  Dry Needle: 6 over left lateral hip (did not do)      Atif Holliday DC, MEd, ATC

## 2020-09-03 ENCOUNTER — THERAPY VISIT (OUTPATIENT)
Dept: CHIROPRACTIC MEDICINE | Facility: CLINIC | Age: 40
End: 2020-09-03
Payer: COMMERCIAL

## 2020-09-03 DIAGNOSIS — M22.2X2 PATELLOFEMORAL PAIN SYNDROME OF BOTH KNEES: ICD-10-CM

## 2020-09-03 DIAGNOSIS — M25.579 PAIN IN JOINT, ANKLE AND FOOT, UNSPECIFIED LATERALITY: ICD-10-CM

## 2020-09-03 DIAGNOSIS — M79.10 MYALGIA: ICD-10-CM

## 2020-09-03 DIAGNOSIS — M99.05 SOMATIC DYSFUNCTION OF PELVIS REGION: Primary | ICD-10-CM

## 2020-09-03 DIAGNOSIS — M22.2X1 PATELLOFEMORAL PAIN SYNDROME OF BOTH KNEES: ICD-10-CM

## 2020-09-03 PROCEDURE — 98940 CHIROPRACT MANJ 1-2 REGIONS: CPT | Mod: AT | Performed by: CHIROPRACTOR

## 2020-09-03 PROCEDURE — 97110 THERAPEUTIC EXERCISES: CPT | Performed by: CHIROPRACTOR

## 2020-09-03 NOTE — PROGRESS NOTES
[facility]      Subjective:  Valencia Roy   Denies any changes in medications   CC:  B knees, L medial ankle   Visit: 5  Goal: run over 30 minutes without hip and back pain  Comes in today doing well. Notes is progressing well and running more. Is heading away for 1 month. Notes left lateral foot over area of cuboid. Notes that mild medial pain as well. Wanted to do some shock wave. Right knee is little tender over knee cap. No pain at rest. Did have blood work done and noted that she is prediabetic. We talked about some strength training and some more Hit training to help and she agreed to this. She overall is pleased with progress.       Objective:  Inspection:  No swelling  Normal Gait    Palpation:  No specific pain  Palpable soreness over left posterior hip, general lower back, B anterior knees  Myofascitis 2/4 noted over  B distal quads,  LPS, L posterior tibialis, L calf    ROM:  Lumbar flexion   90/90, tightness lower back  Lumbar extension  30/30, mild lower back discomfort   Right Hip IR  45/45  Left Hip IR  34/45  Right Hip ER  53/60  Left  hip ER  58/60  Ankle DF limited on L in talar neutral   Knee flexion full with crepitus noted B  Knee extension full and pain free B    MMT:  Left glute med 4/5 with no pain  Right glute med 4/5 with no pain  Left TFL 4/5 with lateral hip discomfort   Right TFL 4/5 with no pain  Ankle eversion 5/5 with no pain   Quads 5/5 B    MET:  Left short leg    Squat:  Shift to left  Foot flare to right  Arm drop on left  B foot collapse     Lunge:  Left knee genu valgum  Leftt knee cross over     NAL:  Restricted SI on left side  Restricted talus L    Kemps (left lower back discomfort), SLR (-)    Assessment:  NAL with associated myofascitis and weakness  Posterior tibialis dysfunction, hip pain, B patella femoral pain     Plan:  Patient tolerated treatment well today  Treatment Time: 45 minutes   35459 manipulation 1-2 segments: MET, Hip LAD  37665  manipulation: Manual extremity  86046 Manual therapy: (ART, Graston, Strain Counter Strain, Fascial Manipulation, Cupping) performed over area of B quads  15791 therapeutic exercise (20 minutes):  Self ankle mulligan, short foot lateral band walks, toe yoga, single leg balance on bosu ball, single leg hops on bosu ball, single leg hops and land off bosu ball, walking lunges with weight, single leg ball toss on trampoline, short foot activation exercises, alter G running: to be scheduled in [place], review of current PT exercises she is doing for back and ankle, made corrections as needed, standing hip hikes, bird dogs on stability ball, Mgill sit ups, clams shells, side lying hip rotation, standing QL stretch, single leg balance, 4 way hip IR (will do 2 of them every other day), self hip mobility and traction with green rouge band, short foot activation, eccentric eversion with band, bottom leg hip ER, standing C stretch, single leg balance for for foot, standing fire hydrant   Today: clam shells, band walks, single leg bridges, standing fire hydrant, single leg step down  Shock wave: 10/3 over L foot  Tape: general knee tracking  Next visit: 2 weeks  Dry Needle: 6 over left lateral hip (did not do)      Atif Holliday DC, MEd, ATC

## 2020-11-04 ENCOUNTER — THERAPY VISIT (OUTPATIENT)
Dept: CHIROPRACTIC MEDICINE | Facility: CLINIC | Age: 40
End: 2020-11-04
Payer: COMMERCIAL

## 2020-11-04 DIAGNOSIS — M99.05 SOMATIC DYSFUNCTION OF PELVIS REGION: Primary | ICD-10-CM

## 2020-11-04 DIAGNOSIS — M25.579 PAIN IN JOINT, ANKLE AND FOOT, UNSPECIFIED LATERALITY: ICD-10-CM

## 2020-11-04 DIAGNOSIS — M25.552 HIP PAIN, LEFT: ICD-10-CM

## 2020-11-04 DIAGNOSIS — M79.10 MYALGIA: ICD-10-CM

## 2020-11-04 DIAGNOSIS — M22.2X1 PATELLOFEMORAL PAIN SYNDROME OF BOTH KNEES: ICD-10-CM

## 2020-11-04 DIAGNOSIS — M22.2X2 PATELLOFEMORAL PAIN SYNDROME OF BOTH KNEES: ICD-10-CM

## 2020-11-04 PROCEDURE — 98940 CHIROPRACT MANJ 1-2 REGIONS: CPT | Mod: AT | Performed by: CHIROPRACTOR

## 2020-11-04 PROCEDURE — 97110 THERAPEUTIC EXERCISES: CPT | Mod: GP | Performed by: CHIROPRACTOR

## 2020-11-08 NOTE — PROGRESS NOTES
[facility]      Subjective:  Valencia Roy   Denies any changes in medications   CC:  B knees, L medial ankle   Visit: 6  Goal: run over 30 minutes without hip and back pain  Comes in today doing well. Notes is progressing well and running more. She does return from a vacation that did help. Notes that left lateral hip is feeling pretty good. B knees are tight but active care is going well. Notes lower back is tight. All of these are similar symptoms in past but notes she is managing them well with active care program. She denies any new issues. She is pleased with progress.       Objective:  Inspection:  No swelling  Normal Gait    Palpation:  No specific pain  Palpable soreness over left posterior hip, general lower back, B anterior knees  Myofascitis 2/4 noted over  B distal quads,  LPS, L posterior tibialis, L calf    ROM:  Lumbar flexion   90/90, tightness lower back  Lumbar extension  30/30, mild lower back discomfort   Right Hip IR  45/45  Left Hip IR  34/45  Right Hip ER  53/60  Left  hip ER  58/60  Ankle DF limited on L in talar neutral   Knee flexion full with crepitus noted B  Knee extension full and pain free B    MMT:  Left glute med 4/5 with no pain  Right glute med 4/5 with no pain  Left TFL 4/5 with lateral hip discomfort   Right TFL 4/5 with no pain  Ankle eversion 5/5 with no pain   Quads 5/5 B    MET:  Left short leg    Squat:  Shift to left  Foot flare to right  Arm drop on left  B foot collapse     Lunge:  Left knee genu valgum  Leftt knee cross over     NAL:  Restricted SI on left side  Restricted talus L    Kemps (left lower back discomfort), SLR (-)    Assessment:  NAL with associated myofascitis and weakness  Posterior tibialis dysfunction, hip pain, B patella femoral pain     Plan:  Patient tolerated treatment well today  Treatment Time: 45 minutes   96755 manipulation 1-2 segments: MET, Hip LAD  03510 manipulation: Manual extremity  84665 Manual therapy: (ART,  Sangeetha, Strain Counter Strain, Fascial Manipulation, Cupping) performed over area of B quads  35942 therapeutic exercise (20 minutes):  Self ankle mulligan, short foot lateral band walks, toe yoga, single leg balance on bosu ball, single leg hops on bosu ball, single leg hops and land off bosu ball, walking lunges with weight, single leg ball toss on trampoline, short foot activation exercises, alter G running: to be scheduled in [place], review of current PT exercises she is doing for back and ankle, made corrections as needed, standing hip hikes, bird dogs on stability ball, Mgill sit ups, clams shells, side lying hip rotation, standing QL stretch, single leg balance, 4 way hip IR (will do 2 of them every other day), self hip mobility and traction with green rouge band, short foot activation, eccentric eversion with band, bottom leg hip ER, standing C stretch, single leg balance for for foot, standing fire hydrant   Today: clam shells, band walks, single leg bridges, standing fire hydrant, single leg step down  Shock wave: 10/3 over L foot (did not do)  Tape: general knee tracking  Next visit: 2 weeks  Dry Needle: 6 over left lateral hip (did not do)      Atif Holliday DC, MEd, ATC

## 2020-12-27 ENCOUNTER — HEALTH MAINTENANCE LETTER (OUTPATIENT)
Age: 40
End: 2020-12-27

## 2021-04-24 ENCOUNTER — HEALTH MAINTENANCE LETTER (OUTPATIENT)
Age: 41
End: 2021-04-24

## 2021-10-03 ENCOUNTER — HEALTH MAINTENANCE LETTER (OUTPATIENT)
Age: 41
End: 2021-10-03

## 2022-05-15 ENCOUNTER — HEALTH MAINTENANCE LETTER (OUTPATIENT)
Age: 42
End: 2022-05-15

## 2022-06-12 ASSESSMENT — ENCOUNTER SYMPTOMS
DYSURIA: 0
FREQUENCY: 0
PALPITATIONS: 0
ABDOMINAL PAIN: 0
DIARRHEA: 0
COUGH: 0
SORE THROAT: 0
MYALGIAS: 0
BREAST MASS: 0
SHORTNESS OF BREATH: 0
DIZZINESS: 0
EYE PAIN: 0
NERVOUS/ANXIOUS: 0
FEVER: 0
PARESTHESIAS: 0
ARTHRALGIAS: 0
HEMATOCHEZIA: 0
WEAKNESS: 0
CONSTIPATION: 0
JOINT SWELLING: 0
HEADACHES: 0
HEMATURIA: 0
NAUSEA: 0
CHILLS: 0
HEARTBURN: 0

## 2022-06-13 ENCOUNTER — OFFICE VISIT (OUTPATIENT)
Dept: FAMILY MEDICINE | Facility: CLINIC | Age: 42
End: 2022-06-13
Payer: COMMERCIAL

## 2022-06-13 VITALS
BODY MASS INDEX: 23.39 KG/M2 | DIASTOLIC BLOOD PRESSURE: 62 MMHG | RESPIRATION RATE: 12 BRPM | HEART RATE: 74 BPM | HEIGHT: 63 IN | TEMPERATURE: 98.1 F | OXYGEN SATURATION: 100 % | WEIGHT: 132 LBS | SYSTOLIC BLOOD PRESSURE: 100 MMHG

## 2022-06-13 DIAGNOSIS — Z11.59 NEED FOR HEPATITIS C SCREENING TEST: ICD-10-CM

## 2022-06-13 DIAGNOSIS — E55.9 VITAMIN D DEFICIENCY: ICD-10-CM

## 2022-06-13 DIAGNOSIS — Z13.228 ENCOUNTER FOR SCREENING FOR OTHER METABOLIC DISORDERS: ICD-10-CM

## 2022-06-13 DIAGNOSIS — Z12.4 CERVICAL CANCER SCREENING: ICD-10-CM

## 2022-06-13 DIAGNOSIS — Z00.00 HEALTH CARE MAINTENANCE: Primary | ICD-10-CM

## 2022-06-13 PROCEDURE — 99386 PREV VISIT NEW AGE 40-64: CPT | Performed by: FAMILY MEDICINE

## 2022-06-13 PROCEDURE — 87624 HPV HI-RISK TYP POOLED RSLT: CPT | Performed by: FAMILY MEDICINE

## 2022-06-13 PROCEDURE — G0123 SCREEN CERV/VAG THIN LAYER: HCPCS | Performed by: FAMILY MEDICINE

## 2022-06-13 ASSESSMENT — PAIN SCALES - GENERAL: PAINLEVEL: NO PAIN (0)

## 2022-06-13 NOTE — PROGRESS NOTES
FEMALE ADULT PREVENTIVE EXAM    Valencia was seen today for physical.    Diagnoses and all orders for this visit:    Health care maintenance  -     REVIEW OF HEALTH MAINTENANCE PROTOCOL ORDERS  Consider a mammogram once she has stopped breast feeding and before getting pregnant again.    Need for hepatitis C screening test  -     Hepatitis C Screen Reflex to HCV RNA Quant and Genotype; Future    Cervical cancer screening  -     Pap Screen with HPV - recommended age 30 - 65 years    Vitamin D deficiency  -     Vitamin D Deficiency; Future    Encounter for screening for other metabolic disorders  -     CBC with platelets; Future  -     Comprehensive metabolic panel; Future  -     Ferritin; Future  -     TSH; Future  -     T4 free; Future  -     T3 Free; Future  -     **A1C FUTURE 3mo; Future          CHIEF COMPLAINT:  Female preventive exam.    SUBJECTIVE:  Valencia Escalona is a 41 year old female who presents for her routine physical exam.    Patient would like to address the following concerns today:   Sees naturopath and was on a number of supplements.  She would like to try and conceive in the future.    Has history of elevated glucose after pregnancy - A1C was 5.7.    Mother had h/o breast cancer. She has not had mammogram.         GYNE HISTORY  Menses: monthly  Sexually Active: yes  Contraception: condoms  Last Pap: 2017 normal  Abnormal Pap: no        She  has a past medical history of Concussion (2013) and Multiple food allergies.    Lab Results   Component Value Date    WBC 8.0 09/05/2017    HGB 13.8 09/05/2017    HCT 42.0 09/05/2017    MCV 89 09/05/2017     09/05/2017     05/16/2018    BUN 13 05/16/2018    CR 0.82 05/16/2018     Lab Results   Component Value Date    CHOL 192 05/16/2018    HDL 66 05/16/2018    TRIG 81 05/16/2018     Lab Results   Component Value Date    TSH 0.94 05/16/2018     BP Readings from Last 3 Encounters:   06/13/22 100/62   05/16/18 113/73   09/05/17 123/80        Surgeries:    Past Surgical History:   Procedure Laterality Date     HC TOOTH EXTRACTION W/FORCEP         Family History:  Her family history includes Breast Cancer in an other family member; Breast Cancer (age of onset: 70) in her mother and paternal aunt; Cerebrovascular Disease in her maternal grandmother; Coronary Artery Disease in her maternal grandfather and maternal grandmother; Diabetes in her maternal grandfather and maternal grandmother; Kidney Cancer in her maternal grandmother; Obesity in her maternal grandfather; Prostate Cancer in an other family member.    Social History:  She  reports that she has never smoked. She has never used smokeless tobacco. She reports that she does not drink alcohol and does not use drugs. Moved to [place] and lives with / 2 1/1 yo child/ parents.  Works from home as .  Still breast feeding.    Medications:    Current Outpatient Medications:      BL CALCIUM-MAGNESIUM-ZINC PO, , Disp: , Rfl:      Cholecalciferol (VITAMIN D3) 33282 units TABS, Take 1 capsule by mouth once a week, Disp: , Rfl:      MELATONIN PO, , Disp: , Rfl:      Misc Natural Products (DAILY HERBS RELAX/EASE TENSION PO), , Disp: , Rfl:      Multiple Minerals (CALCIUM/MAGNESIUM/ZINC PO), Vitamin D Metabolic Maintenance, Disp: , Rfl:      Nutritional Supplements (NUTRITIONAL SUPPLEMENT PO), Liver GI Detox, Disp: , Rfl:      Omega-3 Fatty Acids (EPA PLUS PO), , Disp: , Rfl:      Probiotic Product (PROBIOTIC ADVANCED PO), , Disp: , Rfl:      UNABLE TO FIND, MEDICATION NAME: adrenal support, Disp: , Rfl:      Digestive Enzymes (DIGESTIVE ENZYME PO), , Disp: , Rfl:      UNABLE TO FIND, EstroDim, Disp: , Rfl:   HELD MEDICATIONS: None.    Allergies:  No latex allergies.  Allergies   Allergen Reactions     Beef-Derived Products GI Disturbance     [place]-Related Products GI Disturbance     Food [Egg Yolk] GI Disturbance     Lamb's Quarter ([place]) GI Disturbance     Gluten Meal GI Disturbance      Lactase-Lactobacillus GI Disturbance     Nickel Rash     Soybean Oil GI Disturbance            RISK BEHAVIOR & HEALTH HABITS  Answers for HPI/ROS submitted by the patient on 6/12/2022  Frequency of exercise:: 4-5 days/week  Getting at least 3 servings of Calcium per day:: Yes  Diet:: Gluten-free/reduced, Other  Taking medications regularly:: Yes  Medication side effects:: Not applicable  Bi-annual eye exam:: NO  Dental care twice a year:: NO  Sleep apnea or symptoms of sleep apnea:: None  abdominal pain: No  Blood in stool: No  Blood in urine: No  chest pain: No  chills: No  congestion: No  constipation: No  cough: No  diarrhea: No  dizziness: No  ear pain: No  eye pain: No  nervous/anxious: No  fever: No  frequency: No  genital sores: No  headaches: No  hearing loss: No  heartburn: No  arthralgias: No  joint swelling: No  peripheral edema: No  mood changes: No  myalgias: No  nausea: No  dysuria: No  palpitations: No  Skin sensation changes: No  sore throat: No  urgency: No  rash: No  shortness of breath: No  visual disturbance: No  weakness: No  pelvic pain: No  vaginal bleeding: No  vaginal discharge: No  tenderness: No  breast mass: No  breast discharge: No  Additional concerns today:: Yes  Duration of exercise:: 30-45 minutes        REVIEW OF SYSTEMS:  Complete head to toe review of systems is otherwise negative except as above.    OBJECTIVE:  VITAL SIGNS:  /62   Pulse 74   Temp 98.1  F (36.7  C) (Oral)   Resp 12   Wt 59.9 kg (132 lb)   LMP 06/10/2022   SpO2 100%   Breastfeeding Yes   BMI 23.57 kg/m    GENERAL:  Patient alert, in no acute distress.  EYES: PERRLA. Extraoccular movements intact, pupils equal, reactive to light and accommodation.  Normal conjunctiva and lids.    ENT:  Hearing grossly normal.  Normal appearance to ears and nose.  Bilateral TM s, external canals, oropharynx normal. Normal lips, gums and teeth.    NECK:  Supple, without thyromegaly or mass.  RESP:  Clear to auscultation  without crackles, wheezes or distress.  Normal respiratory effort.   CV:  Regular rate and rhythm without murmurs, rubs or gallops.  Normal pedal pulses.  No varicosities or edema.  ABDOMEN:  Soft, non-tender, without hepatosplenomegaly, masses, or hernias.   BREASTS:  Nontender, without masses, nipple discharge, erythema, or axillary adenopathy.    :    External genitalia:  Normal without lesions.  Urethra: Normal appearing  Vagina: Normal without discharge  Cervix: Pink.  No CMT.  Small amount of blood in os.  Uterus:  Nontender, mobile, without masses  Ovaries: Normal without masses  LYMPHATIC: Normal palpation of neck.  No lymphadenopathy.  No bruising.  NEURO:  CN II-XII intact, motor & sensory function all intact.  DTR and reflexes normal.  PSYCHIATRIC:  Alert & oriented with normal mood and affect.  Good judgment and insight.  SKIN:  Normal inspection and palpation.  MUSCULOSKELETAL: Normal gait and station.  - Spine / Ribs / Pelvis: Normal inspection, ROM, stability and strength: Spine, Head, Neck, Upper and Lower Extremities.          Erika Dozier MD

## 2022-06-14 ENCOUNTER — LAB (OUTPATIENT)
Dept: LAB | Facility: CLINIC | Age: 42
End: 2022-06-14
Payer: COMMERCIAL

## 2022-06-14 DIAGNOSIS — Z11.59 NEED FOR HEPATITIS C SCREENING TEST: ICD-10-CM

## 2022-06-14 DIAGNOSIS — Z13.228 ENCOUNTER FOR SCREENING FOR OTHER METABOLIC DISORDERS: ICD-10-CM

## 2022-06-14 DIAGNOSIS — E55.9 VITAMIN D DEFICIENCY: ICD-10-CM

## 2022-06-14 LAB
ALBUMIN SERPL-MCNC: 4 G/DL (ref 3.4–5)
ALP SERPL-CCNC: 42 U/L (ref 40–150)
ALT SERPL W P-5'-P-CCNC: 21 U/L (ref 0–50)
ANION GAP SERPL CALCULATED.3IONS-SCNC: 6 MMOL/L (ref 3–14)
AST SERPL W P-5'-P-CCNC: 16 U/L (ref 0–45)
BILIRUB SERPL-MCNC: 0.6 MG/DL (ref 0.2–1.3)
BUN SERPL-MCNC: 13 MG/DL (ref 7–30)
CALCIUM SERPL-MCNC: 9.2 MG/DL (ref 8.5–10.1)
CHLORIDE BLD-SCNC: 106 MMOL/L (ref 94–109)
CO2 SERPL-SCNC: 25 MMOL/L (ref 20–32)
CREAT SERPL-MCNC: 0.83 MG/DL (ref 0.52–1.04)
DEPRECATED CALCIDIOL+CALCIFEROL SERPL-MC: 37 UG/L (ref 20–75)
ERYTHROCYTE [DISTWIDTH] IN BLOOD BY AUTOMATED COUNT: 12.4 % (ref 10–15)
FERRITIN SERPL-MCNC: 23 NG/ML (ref 12–150)
GFR SERPL CREATININE-BSD FRML MDRD: 89 ML/MIN/1.73M2
GLUCOSE BLD-MCNC: 98 MG/DL (ref 70–99)
HBA1C MFR BLD: 5.4 % (ref 0–5.6)
HCT VFR BLD AUTO: 41.5 % (ref 35–47)
HCV AB SERPL QL IA: NONREACTIVE
HGB BLD-MCNC: 13.5 G/DL (ref 11.7–15.7)
MCH RBC QN AUTO: 29.7 PG (ref 26.5–33)
MCHC RBC AUTO-ENTMCNC: 32.5 G/DL (ref 31.5–36.5)
MCV RBC AUTO: 91 FL (ref 78–100)
PLATELET # BLD AUTO: 328 10E3/UL (ref 150–450)
POTASSIUM BLD-SCNC: 3.8 MMOL/L (ref 3.4–5.3)
PROT SERPL-MCNC: 6.9 G/DL (ref 6.8–8.8)
RBC # BLD AUTO: 4.54 10E6/UL (ref 3.8–5.2)
SODIUM SERPL-SCNC: 137 MMOL/L (ref 133–144)
T3FREE SERPL-MCNC: 2.2 PG/ML (ref 2.3–4.2)
T4 FREE SERPL-MCNC: 1.06 NG/DL (ref 0.76–1.46)
TSH SERPL DL<=0.005 MIU/L-ACNC: 0.62 MU/L (ref 0.4–4)
WBC # BLD AUTO: 6.5 10E3/UL (ref 4–11)

## 2022-06-14 PROCEDURE — 83036 HEMOGLOBIN GLYCOSYLATED A1C: CPT

## 2022-06-14 PROCEDURE — 85027 COMPLETE CBC AUTOMATED: CPT

## 2022-06-14 PROCEDURE — 84439 ASSAY OF FREE THYROXINE: CPT

## 2022-06-14 PROCEDURE — 82306 VITAMIN D 25 HYDROXY: CPT

## 2022-06-14 PROCEDURE — 82728 ASSAY OF FERRITIN: CPT

## 2022-06-14 PROCEDURE — 84443 ASSAY THYROID STIM HORMONE: CPT

## 2022-06-14 PROCEDURE — 84481 FREE ASSAY (FT-3): CPT

## 2022-06-14 PROCEDURE — 80053 COMPREHEN METABOLIC PANEL: CPT

## 2022-06-14 PROCEDURE — 36415 COLL VENOUS BLD VENIPUNCTURE: CPT

## 2022-06-14 PROCEDURE — 86803 HEPATITIS C AB TEST: CPT

## 2022-06-15 LAB
HUMAN PAPILLOMA VIRUS 16 DNA: NEGATIVE
HUMAN PAPILLOMA VIRUS 18 DNA: NEGATIVE
HUMAN PAPILLOMA VIRUS FINAL DIAGNOSIS: NORMAL
HUMAN PAPILLOMA VIRUS OTHER HR: NEGATIVE

## 2022-06-16 LAB
BKR LAB AP GYN ADEQUACY: NORMAL
BKR LAB AP GYN INTERPRETATION: NORMAL
BKR LAB AP HPV REFLEX: NORMAL
BKR LAB AP LMP: NORMAL
BKR LAB AP PREVIOUS ABNORMAL: NORMAL
PATH REPORT.COMMENTS IMP SPEC: NORMAL
PATH REPORT.COMMENTS IMP SPEC: NORMAL
PATH REPORT.RELEVANT HX SPEC: NORMAL

## 2022-09-11 ENCOUNTER — HEALTH MAINTENANCE LETTER (OUTPATIENT)
Age: 42
End: 2022-09-11

## 2022-11-01 ENCOUNTER — VIRTUAL VISIT (OUTPATIENT)
Dept: FAMILY MEDICINE | Facility: CLINIC | Age: 42
End: 2022-11-01
Payer: COMMERCIAL

## 2022-11-01 DIAGNOSIS — Z31.69 ENCOUNTER FOR PRECONCEPTION CONSULTATION: ICD-10-CM

## 2022-11-01 DIAGNOSIS — R94.6 THYROID FUNCTION TEST ABNORMAL: Primary | ICD-10-CM

## 2022-11-01 PROCEDURE — 99213 OFFICE O/P EST LOW 20 MIN: CPT | Mod: 95 | Performed by: FAMILY MEDICINE

## 2022-11-01 NOTE — PROGRESS NOTES
Valencia is a 42 year old who is being evaluated via a billable video visit.      How would you like to obtain your AVS? MyChart  If the video visit is dropped, the invitation should be resent by: Send to e-mail at: [email or web address]  Will anyone else be joining your video visit? No      Video-Visit Details    Video Start Time: 2:28 PM    Type of service:  Video Visit    Video End Time:2:52 PM    Originating Location (pt. Location): Home    Distant Location (provider location):  On-site    Platform used for Video Visit: Imagga    Problem List Items Addressed This Visit    None  Visit Diagnoses     Thyroid function test abnormal    -  Primary    Relevant Orders    TSH    T3 Free    T4, free    Zinc    Selenium    T3 reverse    Encounter for preconception consultation             Switch to prenatal vitamins with iron and add in DHA/EPA supplements.  Avoid other glandular or herbal supplements.  Patient Instructions   Schedule appointment to recheck thyroid labs    To order supplements go to the web site: "[employer]"  Use my authorization number to order the recommended supplements: S99     Prenatal and Nursing Formula - Take 3 daily     Subjective   Valencia is a 42 year old who presents for the following health issues   Chief Complaint   Patient presents with     Results     F/u    She is here to f/u on low free T3. She had labs done in June with normal TSH and free T4 but her T3 was low.  She is 3 years post-partum. She does have fatigue - not sleeping due to 3 yo child being up at night.  She did wean her baby from breastfeeding.  Having some issues with his  so life is stressful now.    She is planning to conceive but not yet taking a prenatal vitamin.  Her last ferritin was 23.    Patient Active Problem List   Diagnosis     Hyperlipidemia     Irritable bowel syndrome     Vitamin D deficiency     Postconcussion syndrome     Somatic dysfunction of pelvis region     Hip pain, left     Myalgia     Patellofemoral  pain syndrome of both knees     Pain in joint, ankle and foot, unspecified laterality            HPI     Review of Systems         Objective           Vitals:  No vitals were obtained today due to virtual visit.    Physical Exam   GENERAL: Healthy, alert and no distress  EYES: Eyes grossly normal to inspection.  No discharge or erythema, or obvious scleral/conjunctival abnormalities.  RESP: No audible wheeze, cough, or visible cyanosis.  No visible retractions or increased work of breathing.    SKIN: Visible skin clear. No significant rash, abnormal pigmentation or lesions.  NEURO: Cranial nerves grossly intact.  Mentation and speech appropriate for age.  PSYCH: Mentation appears normal, affect normal/bright, judgement and insight intact, normal speech and appearance well-groomed.      Erika Dozier MD

## 2022-11-01 NOTE — PATIENT INSTRUCTIONS
Schedule appointment to recheck thyroid labs    To order supplements go to the web site: [employer]  Use my authorization number to order the recommended supplements: S99     Prenatal and Nursing Formula - Take 3 daily

## 2022-11-04 ENCOUNTER — LAB (OUTPATIENT)
Dept: LAB | Facility: CLINIC | Age: 42
End: 2022-11-04
Payer: COMMERCIAL

## 2022-11-04 DIAGNOSIS — R94.6 THYROID FUNCTION TEST ABNORMAL: ICD-10-CM

## 2022-11-04 LAB
T3FREE SERPL-MCNC: 2.8 PG/ML (ref 2–4.4)
T4 FREE SERPL-MCNC: 1.1 NG/DL (ref 0.76–1.46)
TSH SERPL DL<=0.005 MIU/L-ACNC: 0.55 MU/L (ref 0.4–4)

## 2022-11-04 PROCEDURE — 84630 ASSAY OF ZINC: CPT | Mod: 90

## 2022-11-04 PROCEDURE — 84481 FREE ASSAY (FT-3): CPT

## 2022-11-04 PROCEDURE — 84482 T3 REVERSE: CPT | Mod: 90

## 2022-11-04 PROCEDURE — 36415 COLL VENOUS BLD VENIPUNCTURE: CPT

## 2022-11-04 PROCEDURE — 84255 ASSAY OF SELENIUM: CPT | Mod: 90

## 2022-11-04 PROCEDURE — 84443 ASSAY THYROID STIM HORMONE: CPT

## 2022-11-04 PROCEDURE — 84439 ASSAY OF FREE THYROXINE: CPT

## 2022-11-04 PROCEDURE — 99000 SPECIMEN HANDLING OFFICE-LAB: CPT

## 2022-11-07 LAB
SELENIUM SERPL-MCNC: 235.3 UG/L
ZINC SERPL-MCNC: 98.3 UG/DL

## 2022-11-09 LAB — T3REVERSE SERPL-MCNC: 17.3 NG/DL

## 2022-12-19 ENCOUNTER — TELEPHONE (OUTPATIENT)
Dept: FAMILY MEDICINE | Facility: CLINIC | Age: 42
End: 2022-12-19

## 2022-12-19 NOTE — TELEPHONE ENCOUNTER
Attempted to go through RN covid treatment protocol with patient. Patient had questions regarding postconcussion syndrome that RN was unable to answer. Patient directed to COVID hotline. Patient verbalized understanding.

## 2022-12-19 NOTE — TELEPHONE ENCOUNTER
COVID Positive/Requesting COVID treatment    Patient is positive for COVID and requesting treatment options.    Date of positive COVID test (PCR or at home)? 12/19/2022  Current COVID symptoms: headache and congestion or runny nose  Date COVID symptoms began: 12/18/2022    Message should be routed to clinic RN pool. Best phone number to use for call back: [phone]

## 2022-12-21 ENCOUNTER — VIRTUAL VISIT (OUTPATIENT)
Dept: URGENT CARE | Facility: CLINIC | Age: 42
End: 2022-12-21
Payer: COMMERCIAL

## 2022-12-21 DIAGNOSIS — U07.1 CLINICAL DIAGNOSIS OF COVID-19: Primary | ICD-10-CM

## 2022-12-21 PROCEDURE — 99442 PR PHYSICIAN TELEPHONE EVALUATION 11-20 MIN: CPT | Mod: CS

## 2022-12-21 NOTE — PATIENT INSTRUCTIONS
COVID-19 Outpatient Treatments  Your care team can help you find the best treatments for COVID-19. Talk to a health care provider or refer to the FDA medicine fact sheets below.    Important: You can't have Paxlovid or molnupiravir if you're starting the medicine more than 5 days after your symptoms have started.  Paxlovid: https://www.fda.gov/media/231387/download  Molnupiravir (Lagevrio): https://www.fda.gov/media/267813/download  Paxlovid (nimatrelvir and ritonavir)  How it works  Two medicines (nirmatrelvir and ritonavir) are taken together. They stop the virus from growing. Less amount of virus is easier for your body to fight.  Benefits  Lowers risk of a hospital stay or death from COVID-19.  How to take    Medicine comes in a daily container with both medicine tablets. Take by mouth twice daily (once in the morning, once at night) for 5 days.    The number of tablets to take varies by patient.    Don't chew or break capsules. Swallow whole.  When to take  Take as soon as possible after positive COVID-19 test result, and within 5 days of your first symptoms.  Who can take it  Patients must be 12 years or older, weigh at least 88 pounds, and have tested positive for COVID-19. Paxlovid is the preferred treatment for pregnant patients.  Possible side effects  Can cause altered sense of taste, diarrhea (loose, watery stools), high blood pressure, muscle aches.  Medicine conflicts    Some medicines may conflict with Paxlovid and may cause serious side effects.    Tell your care team about all the medicines you take, including prescription and over-the-counter medicines, vitamins, and herbal supplements.    Your care team will review your medicines to make sure that you can safely take Paxlovid.  Cautions    Paxlovid is not advised for patients with severe kidney or liver disease. If you have kidney or liver problems, the dose may need to be changed.    If you're pregnant or breastfeeding, talk to your care team  about your options.    If you take hormonal birth control (such as the Pill), then you or your partner should also use a non-hormonal form of birth control (such as a condom). Keep doing this for 1 menstrual cycle after your last dose of Paxlovid.  Molnupiravir (Lagevrio)  How it works  Stops the virus from growing. Less amount of virus is easier for your body to fight.  Benefits  Lowers risk of a hospital stay or death from COVID-19.  How to take  Take 4 capsules by mouth every 12 hours (4 in the morning and 4 at night) for 5 days. Don't chew or break capsules. Swallow whole.  When to take  Take as soon as possible after positive COVID-19 test result, and within 5 days of your first symptoms.  Who can take it  Patients must be 18 years or older and have tested positive for COVID-19.  Possible side effects  Diarrhea (loose, watery stools), nausea (feeling sick to your stomach), dizziness, headaches.  Medicine conflicts  Right now, there are no known conflicts with other drugs. But tell your care team about all medicines you take.  Cautions    This medicine is not advised for patients who are pregnant.    If you are someone who could become pregnant, use trusted birth control until 4 days after your last dose of molnupiravir.    If your partner could become pregnant, you should use trusted birth control until 3 months after your last dose of molnupiravir.  For informational purposes only. Not to replace the advice of your health care provider. Copyright   2022 [facility]. All rights reserved. Clinically reviewed by Sabiha Spain, PharmD, BCACP. [employer] [id number] - REV 12/22.    Coping with Life After COVID-19  Being in the hospital because of COVID-19 is scary. Going home can be scary, too. You may face changes to your life, the way you work or what you can eat. It s hard to adjust to change, and it s normal to feel afraid, frustrated or even angry. These feelings usually go away over time. If your  feelings don t start to get better, it s called  adjustment disorder.      What signs should I look out for?  Adjustment disorder can happen to anyone in a time of stress. It makes it hard to cope with daily life. You may feel lonely or fight with loved ones, even if you re glad to be home. Watch for these signs:    Fear or worry    Hard time focusing    Sadness or anger    Trouble talking to family or friends    Feeling like you don t fit in or isolating yourself    Problems with sleep     Drinking alcohol or taking drugs to cope    What can I do?  You can help yourself get better. Feeling you have control helps you move forward. You may wonder if you ll be able to do things you did before. Be patient. Do your best to make the most of every day. Try to build relationships, be as active as you can, eat right and keep a sense of humor. Avoid smoking and drinking too much alcohol. Call your family doctor or clinic if you re not sure what to do. They can guide you to care or other services.    When should I get help?  Think about getting counseling if your sadness or frustration gets worse. Together with a trained counselor, you can talk about your problems adjusting to life after your hospital stay. You can come up with new ways to handle changes that give you more control. Your family doctor or care team can help you find a counselor.     Get help if you re thinking about hurting yourself. If you need help right away, call 911 or go to the nearest emergency room. You can also try the Crisis Text Line.    Crisis Text Line: 259-561 (http://www.crisistextline.org)  The Crisis Text Line serves anyone, in any crisis. It gives free, 24/7 support. Here's how it works:  1. Text HOME to 752852 from anywhere in the [place], anytime, about any type of crisis.  2. A live, trained Crisis Counselor will text you back quickly.  3. The volunteer Crisis Counselor can help you move from a  hot moment  to a  cool moment.  They can also  help you work out a safety plan.

## 2022-12-21 NOTE — PROGRESS NOTES
"Valencia is a 42 year old who is being evaluated via a billable TELEPHONE visit.        Assessment & Plan     Clinical diagnosis of COVID-19    - nirmatrelvir and ritonavir (PAXLOVID) therapy pack; Take 3 tablets by mouth 2 times daily for 5 days (Take 2 Nirmatrelvir tablets and 1 Ritonavir tablet twice daily for 5 days)      15 minutes spent on the date of the encounter doing chart review, patient visit and documentation        COVID-19 positive patient.  Encounter for consideration of medication intervention. Patient does qualify for a prescription. Full discussion with patient including medication options, risks and benefits. Potential drug interactions reviewed with patient.     Treatment Planned Paxlovid sent to [facility] in [facility] on [address]    Temporary change to home medications:  None     Estimated body mass index is 23.57 kg/m  as calculated from the following:    Height as of 6/13/22: 1.594 m (5' 2.75\").    Weight as of 6/13/22: 59.9 kg (132 lb).  GFR Estimate   Date Value Ref Range Status   06/14/2022 89 >60 mL/min/1.73m2 Final     Comment:     Effective December 21, 2021 eGFRcr in adults is calculated using the 2021 CKD-EPI creatinine equation which includes age and gender (Roosevelt et al., NEJ, DOI: 10.1056/LXHBqi6175663)   05/16/2018 78 >60 mL/min/1.7m2 Final     Comment:     Non  GFR Calc     No results found for: [id number]    Return in about 1 week (around 12/28/2022), or if symptoms worsen or fail to improve.    Virtual Urgent Care  [facility] VIRTUAL URGENT CARE    Subjective   Valencia is a 42 year old presenting for the following health issues:  No chief complaint on file.      HPI       COVID-19 Symptom Review  How many days ago did these symptoms start? 2 days ago    Are any of the following symptoms significant for you?    New or worsening difficulty breathing? No    Worsening cough? Yes, it's a dry cough.     Fever or chills? No    Headache: YES    Sore throat: " No    Chest pain: No    Diarrhea: No    Body aches? No    What treatments has patient tried? Decongestant - oral   Does patient live in a nursing home, group home, or shelter? No  Does patient have a way to get food/medications during quarantined? Yes, I have a friend or family member who can help me.        Review of Systems   Constitutional, HEENT, cardiovascular, pulmonary, gi and gu systems are negative, except as otherwise noted.      Objective           Vitals:  No vitals were obtained today due to virtual visit.    Physical Exam   GENERAL: Healthy, alert and no distress  RESP: No audible wheeze, cough.  No  increased work of breathing with conversation.      Telephone visit lasted 11 minutes

## 2023-03-28 ENCOUNTER — OFFICE VISIT (OUTPATIENT)
Dept: OBGYN | Facility: CLINIC | Age: 43
End: 2023-03-28
Payer: COMMERCIAL

## 2023-03-28 VITALS
WEIGHT: 144 LBS | HEART RATE: 63 BPM | HEIGHT: 65 IN | SYSTOLIC BLOOD PRESSURE: 127 MMHG | BODY MASS INDEX: 23.99 KG/M2 | DIASTOLIC BLOOD PRESSURE: 73 MMHG | OXYGEN SATURATION: 98 %

## 2023-03-28 DIAGNOSIS — N92.6 IRREGULAR MENSES: Primary | ICD-10-CM

## 2023-03-28 PROCEDURE — 99203 OFFICE O/P NEW LOW 30 MIN: CPT | Performed by: OBSTETRICS & GYNECOLOGY

## 2023-03-28 ASSESSMENT — PATIENT HEALTH QUESTIONNAIRE - PHQ9: SUM OF ALL RESPONSES TO PHQ QUESTIONS 1-9: 0

## 2023-03-28 NOTE — PATIENT INSTRUCTIONS
Make lab only appointment April 7th to check AMH (how many eggs left) and progesterone to make sure you are ovulating    If menses comes call [phone] and Helen or miguelina can schedule HSG (hysterosalpingogram) for cycle day 7-10 to check if tubes are open.    Also lab only appointment cycle day 3 if possible (can be done day 1, 2, 3 or 4) to check how close to menopause     Semen analysis

## 2023-03-29 NOTE — PROGRESS NOTES
CC: getting pregnant  HPI: Valencia Escalona is a 42 year old female Patient's last menstrual period was 03/15/2023. Menses are regular q 26-28 days, lasting 3 days.     The patient has been trying to conceive for 6 months. They started trying 9/2022 but have not had good timing a few of the cycles. Is doing OPK and seeing +D10. Currently cycle day 14 today.       Prior pregnancy history is as follows: was pregnant after 2 cycles with this same partner.    Allergies: Beef-derived products, Corn-related products, Food [egg yolk], Lamb's quarter (weed), Gluten meal, Lactase-lactobacillus, Nickel, and Soybean oil                    Past Medical History:   Diagnosis Date     Concussion 2013     Multiple food allergies        Past Surgical History:   Procedure Laterality Date     HC TOOTH EXTRACTION W/FORCEP             Social History     Tobacco Use     Smoking status: Never     Smokeless tobacco: Never   Substance Use Topics     Alcohol use: No     Alcohol/week: 0.0 standard drinks              Family History   Problem Relation Age of Onset     Breast Cancer Mother 70     Kidney Cancer Maternal Grandmother      Diabetes Maternal Grandmother      Coronary Artery Disease Maternal Grandmother      Cerebrovascular Disease Maternal Grandmother      Coronary Artery Disease Maternal Grandfather      Diabetes Maternal Grandfather      Obesity Maternal Grandfather      Breast Cancer Paternal Aunt 70     Breast Cancer Other      Prostate Cancer Other        Current Outpatient Medications   Medication Sig Dispense Refill     Cholecalciferol (VITAMIN D3) 19507 units TABS Take 1 capsule by mouth once a week       Digestive Enzymes (DIGESTIVE ENZYME PO)        MELATONIN PO        Multiple Minerals (CALCIUM/MAGNESIUM/ZINC PO) Vitamin D  Metabolic Maintenance       Omega-3 Fatty Acids (EPA PLUS PO)        Probiotic Product (PROBIOTIC ADVANCED PO)        UNABLE TO FIND MEDICATION NAME: adrenal support         PAST  "INFERTILITY EVALUATION AND TREATMENT:  None    Hormonal evaluation has included:   none    Past infertility treatment included none.    Partner is 43 years old. No history of trauma to the genitalia, medications, tobacco, drug use. No prior SA. One prior child with him    EXAM:  Blood pressure 127/73, pulse 63, height 1.651 m (5' 5\"), weight 65.3 kg (144 lb), last menstrual period 03/15/2023, SpO2 98 %, currently breastfeeding.  BMI= Body mass index is 23.96 kg/m .  Patient's last menstrual period was 03/15/2023.  General - pleasant female in no acute distress.  Neurological - alert and oriented X 3  Psychiatric - normal mood and affect    prep time day of service 2 min  visit time with the patient 14 min  post visit work including documentation time 5 min  Total time: 21 min    ASSESSMENT/ PLAN:  (N92.6) Irregular menses  (primary encounter diagnosis)  Comment: cycle day 14 today  Plan: TSH with free T4 reflex, Prolactin,         Anti-Mullerian hormone, Progesterone          Patient Instructions   Make lab only appointment April 7th to check AMH (how many eggs left) and progesterone to make sure you are ovulating    If menses comes call [phone] and Helen or miguelina can schedule HSG (hysterosalpingogram) for cycle day 7-10 to check if tubes are open.    Also lab only appointment cycle day 3 if possible (can be done day 1, 2, 3 or 4) to check how close to menopause     Semen analysis     She is aware if high risk of SAB due to age and IVF often will not use her eggs after age 43 (in July) no questions.     Queenie Ortega MD    "

## 2023-04-14 ENCOUNTER — LAB REQUISITION (OUTPATIENT)
Dept: LAB | Facility: CLINIC | Age: 43
End: 2023-04-14

## 2023-04-14 ENCOUNTER — HOSPITAL ENCOUNTER (OUTPATIENT)
Facility: CLINIC | Age: 43
Discharge: HOME | End: 2023-04-14
Admitting: OBSTETRICS & GYNECOLOGY
Payer: COMMERCIAL

## 2023-04-14 DIAGNOSIS — Z31.41 ENCOUNTER FOR FERTILITY TESTING: ICD-10-CM

## 2023-04-14 LAB
ESTRADIOL SERPL-MCNC: 55 PG/ML
FSH SERPL IRP2-ACNC: 7.5 MIU/ML
LH SERPL-ACNC: 3.9 MIU/ML
MIS SERPL-MCNC: 1.5 NG/ML (ref 0.03–5.5)

## 2023-04-14 PROCEDURE — 83002 ASSAY OF GONADOTROPIN (LH): CPT | Performed by: OBSTETRICS & GYNECOLOGY

## 2023-04-14 PROCEDURE — 83520 IMMUNOASSAY QUANT NOS NONAB: CPT | Performed by: OBSTETRICS & GYNECOLOGY

## 2023-04-14 PROCEDURE — 82670 ASSAY OF TOTAL ESTRADIOL: CPT | Performed by: OBSTETRICS & GYNECOLOGY

## 2023-04-14 PROCEDURE — 83001 ASSAY OF GONADOTROPIN (FSH): CPT | Performed by: OBSTETRICS & GYNECOLOGY

## 2023-04-27 ENCOUNTER — HOSPITAL ENCOUNTER (OUTPATIENT)
Facility: CLINIC | Age: 43
End: 2023-04-27
Payer: COMMERCIAL

## 2023-07-29 ENCOUNTER — HEALTH MAINTENANCE LETTER (OUTPATIENT)
Age: 43
End: 2023-07-29

## 2023-08-22 ENCOUNTER — OFFICE VISIT (OUTPATIENT)
Dept: FAMILY MEDICINE | Facility: CLINIC | Age: 43
End: 2023-08-22
Attending: FAMILY MEDICINE
Payer: COMMERCIAL

## 2023-08-22 ENCOUNTER — LAB (OUTPATIENT)
Dept: FAMILY MEDICINE | Facility: CLINIC | Age: 43
End: 2023-08-22

## 2023-08-22 VITALS
HEART RATE: 61 BPM | TEMPERATURE: 98.4 F | BODY MASS INDEX: 24.73 KG/M2 | SYSTOLIC BLOOD PRESSURE: 119 MMHG | RESPIRATION RATE: 12 BRPM | DIASTOLIC BLOOD PRESSURE: 75 MMHG | OXYGEN SATURATION: 99 % | HEIGHT: 63 IN | WEIGHT: 139.6 LBS

## 2023-08-22 DIAGNOSIS — Z12.11 SCREEN FOR COLON CANCER: ICD-10-CM

## 2023-08-22 DIAGNOSIS — Z00.00 HEALTH CARE MAINTENANCE: Primary | ICD-10-CM

## 2023-08-22 DIAGNOSIS — Z12.31 VISIT FOR SCREENING MAMMOGRAM: ICD-10-CM

## 2023-08-22 PROCEDURE — 99396 PREV VISIT EST AGE 40-64: CPT | Performed by: FAMILY MEDICINE

## 2023-08-22 ASSESSMENT — ENCOUNTER SYMPTOMS
ARTHRALGIAS: 0
JOINT SWELLING: 0
SHORTNESS OF BREATH: 0
FEVER: 0
MYALGIAS: 0
DIARRHEA: 0
HEMATURIA: 0
WEAKNESS: 0
PARESTHESIAS: 0
DIZZINESS: 0
COUGH: 0
DYSURIA: 0
ABDOMINAL PAIN: 0
HEARTBURN: 0
PALPITATIONS: 0
NAUSEA: 0
FREQUENCY: 0
SORE THROAT: 0
HEMATOCHEZIA: 0
CONSTIPATION: 0
CHILLS: 0
HEADACHES: 0
NERVOUS/ANXIOUS: 0
EYE PAIN: 0
BREAST MASS: 0

## 2023-08-22 NOTE — PROGRESS NOTES
FEMALE ADULT PREVENTIVE EXAM:    Problem List Items Addressed This Visit       Health care maintenance - Primary    Relevant Orders    REVIEW OF HEALTH MAINTENANCE PROTOCOL ORDERS (Completed)     Other Visit Diagnoses       Visit for screening mammogram        Screen for colon cancer        Relevant Orders    COLOGTHANH([employer])           Discussed starting paps at age 43.  She prefers Cologuard for colon cancer screening.  Pap up to date.  Had normal lab work last year so prefers to hold on further labs.    CHIEF COMPLAINT:  Female preventive exam.    SUBJECTIVE:  Valencia Escalona is a 43 year old female who presents for her routine physical exam.    Patient would like to address the following concerns today:     She has been trying to conceive for a year. She did [place] test and her testosterone was a little low.  She is taking jared. She has had some early miscarriages.  She did have consult with IVF provider.      GYNE HISTORY  Menses: monthly  Sexually Active: yes  Contraception: no- trying to conceive  Last Pap: 6/2022  Abnormal Pap: no      She  has a past medical history of Concussion (2013) and Multiple food allergies.    Lab Results   Component Value Date    WBC 6.5 06/14/2022    HGB 13.5 06/14/2022    HCT 41.5 06/14/2022    MCV 91 06/14/2022     06/14/2022     06/14/2022    BUN 13 06/14/2022    CR 0.83 06/14/2022     Lab Results   Component Value Date    CHOL 192 05/16/2018    HDL 66 05/16/2018    TRIG 81 05/16/2018     Lab Results   Component Value Date    TSH 0.55 11/04/2022     BP Readings from Last 3 Encounters:   08/22/23 119/75   03/28/23 127/73   06/13/22 100/62       Surgeries:    Past Surgical History:   Procedure Laterality Date    HC TOOTH EXTRACTION W/FORCEP         Family History:  Her family history includes Breast Cancer in an other family member; Breast Cancer (age of onset: 70) in her mother and paternal aunt; Cerebrovascular Disease in her maternal  grandmother; Coronary Artery Disease in her maternal grandfather and maternal grandmother; Diabetes in her maternal grandfather and maternal grandmother; Kidney Cancer in her maternal grandmother; Obesity in her maternal grandfather; Prostate Cancer in an other family member.    Social History:  She  reports that she has never smoked. She has never been exposed to tobacco smoke. She has never used smokeless tobacco. She reports that she does not drink alcohol and does not use drugs.  with 5 yo son. Works as  for [employer].    Medications:    Current Outpatient Medications:     Cholecalciferol (VITAMIN D3) 19604 units TABS, Take 1 capsule by mouth once a week, Disp: , Rfl:     Digestive Enzymes (DIGESTIVE ENZYME PO), , Disp: , Rfl:     MELATONIN PO, , Disp: , Rfl:     Multiple Minerals (CALCIUM/MAGNESIUM/ZINC PO), Vitamin D Metabolic Maintenance, Disp: , Rfl:     Omega-3 Fatty Acids (EPA PLUS PO), , Disp: , Rfl:     Probiotic Product (PROBIOTIC ADVANCED PO), , Disp: , Rfl:     UNABLE TO FIND, MEDICATION NAME: Mary, Disp: , Rfl:     UNABLE TO FIND, MEDICATION NAME: ubiquinol, Disp: , Rfl:     UNABLE TO FIND, MEDICATION NAME: Progesterone oil day 15-28 every cycle, Disp: , Rfl:     UNABLE TO FIND, MEDICATION NAME: adrenal support, Disp: , Rfl:   HELD MEDICATIONS: None.      Allergies:  No latex allergies.  Allergies   Allergen Reactions    Beef-Derived Products GI Disturbance    Corn-Containing Products GI Disturbance    Food [Egg Yolk] GI Disturbance    Lamb's Quarter ([place]) GI Disturbance    Gluten Meal GI Disturbance    Lactase-Lactobacillus GI Disturbance    Nickel Rash    Soybean Oil GI Disturbance            RISK BEHAVIOR & HEALTH HABITS  Answers submitted by the patient for this visit:  Annual Preventive Visit (Submitted on 8/22/2023)  Chief Complaint: Annual Exam:  Frequency of exercise:: 2-3 days/week  Getting at least 3 servings of Calcium per day:: Yes  Diet:: Gluten-free/reduced,  "Other  Taking medications regularly:: Yes  Medication side effects:: None  Bi-annual eye exam:: NO  Dental care twice a year:: NO  Sleep apnea or symptoms of sleep apnea:: None  abdominal pain: No  Blood in stool: No  Blood in urine: No  chest pain: No  chills: No  congestion: No  constipation: No  cough: No  diarrhea: No  dizziness: No  ear pain: No  eye pain: No  nervous/anxious: No  fever: No  frequency: No  genital sores: No  headaches: No  hearing loss: No  heartburn: No  arthralgias: No  joint swelling: No  peripheral edema: No  mood changes: No  myalgias: No  nausea: No  dysuria: No  palpitations: No  Skin sensation changes: No  sore throat: No  urgency: No  rash: No  shortness of breath: No  visual disturbance: No  weakness: No  pelvic pain: No  vaginal bleeding: No  vaginal discharge: No  tenderness: No  breast mass: No  breast discharge: No  Additional concerns today:: No  Exercise outside of work (Submitted on 8/22/2023)  Chief Complaint: Annual Exam:  Duration of exercise:: 15-30 minutes      REVIEW OF SYSTEMS:  Complete head to toe review of systems is otherwise negative except as above.    OBJECTIVE:  VITAL SIGNS:  /75 (BP Location: Left arm, Patient Position: Sitting, Cuff Size: Adult Regular)   Pulse 61   Temp 98.4  F (36.9  C) (Oral)   Resp 12   Ht 1.594 m (5' 2.75\")   Wt 63.3 kg (139 lb 9.6 oz)   LMP 08/04/2023   SpO2 99%   BMI 24.93 kg/m    GENERAL:  Patient alert, in no acute distress.  EYES: PERRLA. Extraoccular movements intact, pupils equal, reactive to light and accommodation.  Normal conjunctiva and lids.   ENT:  Hearing grossly normal.  Normal appearance to ears and nose.  Bilateral TM s, external canals, oropharynx normal. Normal lips, gums and teeth.   NECK:  Supple, without thyromegaly or mass.  RESP:  Clear to auscultation without crackles, wheezes or distress.  Normal respiratory effort.   CV:  Regular rate and rhythm without murmurs, rubs or gallops.  Normal pedal " pulses.  No varicosities or edema.  ABDOMEN:  Soft, non-tender, without hepatosplenomegaly, masses, or hernias.   BREASTS:  Nontender, without masses, nipple discharge, erythema, or axillary adenopathy.    LYMPHATIC: No cervical or axillary lymphadenopathy.  No bruising.  NEURO:  CN II-XII intact, motor & sensory function all intact.  DTR and reflexes normal.  PSYCHIATRIC:  Alert & oriented with normal mood and affect.  Good judgment and insight.  SKIN:  Normal inspection and palpation.  MUSCULOSKELETAL: Normal gait and station.  - Spine / Ribs / Pelvis: Normal inspection, ROM, stability and strength: Spine, Head, Neck, Upper and Lower Extremities.        Erika Dozier MD

## 2023-08-24 ENCOUNTER — MYC MEDICAL ADVICE (OUTPATIENT)
Dept: FAMILY MEDICINE | Facility: CLINIC | Age: 43
End: 2023-08-24
Payer: COMMERCIAL

## 2023-08-24 DIAGNOSIS — S79.912A INJURY OF LEFT HIP: ICD-10-CM

## 2023-08-24 DIAGNOSIS — S49.92XA SHOULDER INJURY, LEFT, INITIAL ENCOUNTER: Primary | ICD-10-CM

## 2023-08-24 DIAGNOSIS — M25.552 HIP PAIN, LEFT: ICD-10-CM

## 2023-11-27 ENCOUNTER — OFFICE VISIT (OUTPATIENT)
Dept: FAMILY MEDICINE | Facility: CLINIC | Age: 43
End: 2023-11-27
Payer: COMMERCIAL

## 2023-11-27 VITALS
RESPIRATION RATE: 15 BRPM | TEMPERATURE: 97.9 F | HEIGHT: 63 IN | WEIGHT: 140 LBS | OXYGEN SATURATION: 98 % | SYSTOLIC BLOOD PRESSURE: 132 MMHG | HEART RATE: 75 BPM | DIASTOLIC BLOOD PRESSURE: 78 MMHG | BODY MASS INDEX: 24.8 KG/M2

## 2023-11-27 DIAGNOSIS — J06.9 VIRAL UPPER RESPIRATORY TRACT INFECTION: Primary | ICD-10-CM

## 2023-11-27 PROCEDURE — 99213 OFFICE O/P EST LOW 20 MIN: CPT | Performed by: FAMILY MEDICINE

## 2023-11-27 ASSESSMENT — ENCOUNTER SYMPTOMS
SORE THROAT: 1
COUGH: 1
HEADACHES: 1

## 2023-11-27 NOTE — PROGRESS NOTES
Assessment & Plan     Viral upper respiratory tract infection  -Discussed no signs of pneumonia or bacterial sinusitis  -Continues to have residual symptoms from URI 4 weeks ago on top of new URI.  -Discussed symptomatic care at home and staying well hydrated  -Can try antihistamine and flonase daily x 1 week  -Follow-up if worsening symptoms                   DO ELMER [facility]    Jonatan Birmingham is a 43 year old, presenting for the following health issues:  URI, Ear Problem, and Headache      11/27/2023     9:18 AM   Additional Questions   Roomed by Randall   Accompanied by Self     4 weeks of cold symtpoms. Negative for covid.  Congestion and cough, waxes and wanes.  Headache across the forehead, mild and intermittent.  Drainage, intermittent sore throat.  Left ear pain, yesterday both hurting.  Sometimes takes zyrtec. Just started mucinex.  Has 4 year old that attends day care.    Denies fever, chills, wheezing, SOB.      URI  Associated symptoms include coughing, headaches and a sore throat.   Ear Problem  The current episode started yesterday. There is pain in both ears. The problem occurs constantly. The problem has been gradually improving. There has been no fever. Associated symptoms include headaches, sore throat and cough.   Headache     History of Present Illness       Reason for visit:  Have cold for 4w and seems worse  Symptom onset:  3-4 weeks ago    She eats 4 or more servings of fruits and vegetables daily.She consumes 0 sweetened beverage(s) daily.She exercises with enough effort to increase her heart rate 30 to 60 minutes per day.  She exercises with enough effort to increase her heart rate 4 days per week.   She is taking medications regularly.                 Review of Systems   HENT:  Positive for ear pain and sore throat.    Respiratory:  Positive for cough.    Neurological:  Positive for headaches.            Objective    /78 (BP Location: Right  "arm, Patient Position: Sitting, Cuff Size: Adult Regular)   Pulse 75   Temp 97.9  F (36.6  C) (Temporal)   Resp 15   Ht 1.61 m (5' 3.39\")   Wt 63.5 kg (140 lb)   LMP 11/17/2023   SpO2 98%   BMI 24.50 kg/m    Body mass index is 24.5 kg/m .  Physical Exam   GENERAL: healthy, alert and no distress  EYES: Eyes grossly normal to inspection, PERRL and conjunctivae and sclerae normal  HENT: ear canals and TM's normal, nose and mouth without ulcers or lesions  RESP: lungs clear to auscultation - no rales, rhonchi or wheezes  CV: regular rate and rhythm, normal S1 S2, no S3 or S4, no murmur, click or rub, no peripheral edema and peripheral pulses strong  SKIN: no suspicious lesions or rashes  PSYCH: mentation appears normal, affect normal/bright                      "

## 2023-12-18 ENCOUNTER — TRANSFERRED RECORDS (OUTPATIENT)
Dept: FAMILY MEDICINE | Facility: CLINIC | Age: 43
End: 2023-12-18

## 2024-01-10 ENCOUNTER — IMMUNIZATION (OUTPATIENT)
Dept: NURSING | Facility: CLINIC | Age: 44
End: 2024-01-10
Payer: COMMERCIAL

## 2024-01-10 PROCEDURE — 90686 IIV4 VACC NO PRSV 0.5 ML IM: CPT

## 2024-01-10 PROCEDURE — 90471 IMMUNIZATION ADMIN: CPT

## 2024-02-05 LAB — NONINV COLON CA DNA+OCC BLD SCRN STL QL: NEGATIVE

## 2024-04-24 ENCOUNTER — TRANSFERRED RECORDS (OUTPATIENT)
Dept: HEALTH INFORMATION MANAGEMENT | Facility: CLINIC | Age: 44
End: 2024-04-24
Payer: COMMERCIAL

## 2024-04-24 ENCOUNTER — MEDICAL CORRESPONDENCE (OUTPATIENT)
Dept: HEALTH INFORMATION MANAGEMENT | Facility: CLINIC | Age: 44
End: 2024-04-24
Payer: COMMERCIAL

## 2024-04-25 ENCOUNTER — TRANSCRIBE ORDERS (OUTPATIENT)
Dept: OTHER | Age: 44
End: 2024-04-25

## 2024-04-25 DIAGNOSIS — E03.9 HYPOTHYROIDISM, UNSPECIFIED: Primary | ICD-10-CM

## 2024-05-02 ENCOUNTER — TRANSFERRED RECORDS (OUTPATIENT)
Dept: HEALTH INFORMATION MANAGEMENT | Facility: CLINIC | Age: 44
End: 2024-05-02
Payer: COMMERCIAL

## 2024-05-02 LAB
CREATININE (EXTERNAL): 0.65 MG/DL (ref 0.52–1.04)
GFR ESTIMATED (EXTERNAL): >60 ML/MIN/1.7
GLUCOSE (EXTERNAL): 147 MG/DL (ref 70–99)
POTASSIUM (EXTERNAL): 4.3 MMOL/L (ref 3.5–5.1)
TSH SERPL-ACNC: <0.02 UIU/ML (ref 0.47–4.68)

## 2024-05-07 ENCOUNTER — ANCILLARY PROCEDURE (OUTPATIENT)
Dept: ULTRASOUND IMAGING | Facility: CLINIC | Age: 44
End: 2024-05-07
Attending: INTERNAL MEDICINE
Payer: COMMERCIAL

## 2024-05-07 ENCOUNTER — TRANSFERRED RECORDS (OUTPATIENT)
Dept: HEALTH INFORMATION MANAGEMENT | Facility: CLINIC | Age: 44
End: 2024-05-07

## 2024-05-07 DIAGNOSIS — E06.9 THYROIDITIS: ICD-10-CM

## 2024-05-07 PROCEDURE — 76536 US EXAM OF HEAD AND NECK: CPT

## 2024-05-15 ENCOUNTER — TELEPHONE (OUTPATIENT)
Dept: ENDOCRINOLOGY | Facility: CLINIC | Age: 44
End: 2024-05-15
Payer: COMMERCIAL

## 2024-05-15 NOTE — TELEPHONE ENCOUNTER
DX, Referring NOTES: Hypothyroidism    For Cancer Patients: Need the original operative and surgical pathology reports and all imaging reports/images related to the disease (includes all thyroid US, nuclear thyroid and total body scans, PET scans, chest CT reports since prior to the diagnosis ).   APPT DATE: 5/22/2024   NOTES (FOR ALL VISITS) STATUS DETAILS   OFFICE NOTES from referring provider Received [facility]:  4/24/24 - OBGYN OV with Dr. Fernandez   OFFICE NOTES from other specialist Received [facility] clinic of [facility]":  5/7/24, 5/2/24 - ENDO OV with Dr. Seymour   ED NOTES N/A    OPERATIVE REPORT  (thyroid, pituitary, adrenal, parathyroid)  (All op notes related to diagnoses) N/A    MEDICATION LIST Received    IMAGING      ULTRASOUND (HEAD/NECK)  * Include FNAs Internal [facility]:  5/7/24 - US THyroid   LABS     DIABETES: HBGA1C, CREATININE, FASTING LIPIDS, MICROALBUMIN URINE, POTASSIUM, TSH, T4    THYROID: TSH, T4, CBC, THYRODLONULIN, TOTAL T3, FREE T4, CALCITONIN, CEA Received [facility]:  5/7/24 - CBC  5/7/24, 5/2/24 - TSH, T4, T3    [facility]:  4/14/23 - EStradiol    [facility]:  11/4/22 -TSH, T4, T3  6/14/22 - CBC  6/14/22 - CMP  6/14/22 - HBGA1C     Records Requested  05/15/24    Facility  [facility] of \[facility]\""  Fax: [phone]   Outcome * 5/15/24 11:51 AM Faxed urgent request to [facility] for records to be faxed to the clinic. - Korina    * 5/15/24 1:11 PM Records received from [facility] and attached to the patient in PACs. - Korina

## 2024-05-15 NOTE — TELEPHONE ENCOUNTER
Patient confirmed scheduled appointment:  Date: 5/22   Time: 3 pm   Visit type: new endocrine   Provider: Sharan   [place] [facility] [place]/imaging: NA   Additional notes: Spoke to pt and she was aware of appt. Went ahead and scheduled per below message     Please schedule her for WED 5/22/24 at 3 PM face to face appt with me.  Thanks .  This is an overbook  MD Queenie Lundy on 5/15/2024 at 11:27 AM

## 2024-05-22 ENCOUNTER — LAB (OUTPATIENT)
Dept: LAB | Facility: CLINIC | Age: 44
End: 2024-05-22
Payer: COMMERCIAL

## 2024-05-22 ENCOUNTER — PRE VISIT (OUTPATIENT)
Dept: ENDOCRINOLOGY | Facility: CLINIC | Age: 44
End: 2024-05-22

## 2024-05-22 ENCOUNTER — OFFICE VISIT (OUTPATIENT)
Dept: ENDOCRINOLOGY | Facility: CLINIC | Age: 44
End: 2024-05-22
Attending: OBSTETRICS & GYNECOLOGY
Payer: COMMERCIAL

## 2024-05-22 VITALS
SYSTOLIC BLOOD PRESSURE: 115 MMHG | WEIGHT: 134 LBS | HEART RATE: 73 BPM | BODY MASS INDEX: 23.74 KG/M2 | DIASTOLIC BLOOD PRESSURE: 75 MMHG | OXYGEN SATURATION: 98 % | HEIGHT: 63 IN

## 2024-05-22 DIAGNOSIS — E05.90 THYROTOXICOSIS WITHOUT THYROID STORM, UNSPECIFIED THYROTOXICOSIS TYPE: Primary | ICD-10-CM

## 2024-05-22 DIAGNOSIS — Z30.09 FAMILY PLANNING: ICD-10-CM

## 2024-05-22 DIAGNOSIS — R25.1 TREMOR: ICD-10-CM

## 2024-05-22 DIAGNOSIS — E78.5 HYPERLIPIDEMIA: Primary | ICD-10-CM

## 2024-05-22 DIAGNOSIS — E05.90 THYROTOXICOSIS WITHOUT THYROID STORM, UNSPECIFIED THYROTOXICOSIS TYPE: ICD-10-CM

## 2024-05-22 LAB
CHOLEST SERPL-MCNC: 211 MG/DL
CRP SERPL-MCNC: <3 MG/L
ERYTHROCYTE [SEDIMENTATION RATE] IN BLOOD BY WESTERGREN METHOD: 10 MM/HR (ref 0–20)
FASTING STATUS PATIENT QL REPORTED: NO
HDLC SERPL-MCNC: 60 MG/DL
LDLC SERPL CALC-MCNC: 129 MG/DL
Lab: NORMAL
NONHDLC SERPL-MCNC: 151 MG/DL
PERFORMING LABORATORY: NORMAL
SPECIMEN STATUS: NORMAL
T4 FREE SERPL-MCNC: 0.74 NG/DL (ref 0.9–1.7)
TEST NAME: NORMAL
TRIGL SERPL-MCNC: 112 MG/DL
TSH SERPL DL<=0.005 MIU/L-ACNC: 0.85 UIU/ML (ref 0.3–4.2)

## 2024-05-22 PROCEDURE — 80061 LIPID PANEL: CPT | Performed by: PATHOLOGY

## 2024-05-22 PROCEDURE — 84439 ASSAY OF FREE THYROXINE: CPT | Performed by: PATHOLOGY

## 2024-05-22 PROCEDURE — 84443 ASSAY THYROID STIM HORMONE: CPT | Performed by: PATHOLOGY

## 2024-05-22 PROCEDURE — 84480 ASSAY TRIIODOTHYRONINE (T3): CPT

## 2024-05-22 PROCEDURE — 99205 OFFICE O/P NEW HI 60 MIN: CPT | Mod: GC

## 2024-05-22 PROCEDURE — 86140 C-REACTIVE PROTEIN: CPT | Performed by: PATHOLOGY

## 2024-05-22 PROCEDURE — 84445 ASSAY OF TSI GLOBULIN: CPT | Mod: 90 | Performed by: PATHOLOGY

## 2024-05-22 PROCEDURE — 36415 COLL VENOUS BLD VENIPUNCTURE: CPT | Performed by: PATHOLOGY

## 2024-05-22 PROCEDURE — 99000 SPECIMEN HANDLING OFFICE-LAB: CPT | Performed by: PATHOLOGY

## 2024-05-22 PROCEDURE — 83520 IMMUNOASSAY QUANT NOS NONAB: CPT | Performed by: PATHOLOGY

## 2024-05-22 PROCEDURE — 85652 RBC SED RATE AUTOMATED: CPT | Performed by: PATHOLOGY

## 2024-05-22 RX ORDER — METHIMAZOLE 5 MG/1
15 TABLET ORAL
COMMUNITY
End: 2024-05-23

## 2024-05-22 ASSESSMENT — PAIN SCALES - GENERAL: PAINLEVEL: NO PAIN (0)

## 2024-05-22 NOTE — PROGRESS NOTES
Endocrinology Clinic     Valencia Escalona MRN:[id number] YOB: 1980  Primary care provider: Sarah Cisneros     Medical Decision Making:   # Thyrotoxicosis  -initial symptoms: sore throat, swollen and tender neck, fatigue, night sweat, tremor, weight loss  -Initial labs: FT4 3.71 (0.78-2.19),, TSH <0.02, ESR 63, MARIE < 1, TPO < 1, TBG 20.4, TSI < 89%   -US thyroid shows no nodule, low blood flow, heterogeneous  -DD: likely thyroiditis vs grave dz with negative AB, literature also show hcg injection could affect TSH level.   -initially seen by Dr Jossue Seymour, no uptake scan done since did not want to delay her retrieval egg plan. The improvement of her thyroid function could be due to methimazole or just resolving thyroiditis. However based on her initial presentation with neck pain, and low US blood flow, this will suggest thyroiditis.  Currently taking methimazole 15 mg daily    PLAN:  Repeat TFT, TRAb, TSI, ESR , CRP.   Low threshold to hold methimazole and retest few days after that      History of Present Illness:   Valencia Escalona a 43 year old is here for the first time evaluation of hypothyroidism.    History of problem:  4/2024 son had strep throat  4/13/2024 strep test negative. Neck is painful.  4/19/2024 whole neck tender mostly on left. Also low grade fever, chills. Might have lost some weight by then. Took ibuprofen on 4/21/2024, HR is 75-85 (15 higher than baseline), with night sweat.  Ordered blood test 4/23/2024 and FT4 3.5, FT3 9.7, TSI  4/25/2024 improved the neck swelling  4/27/2024 muscle ext, shaky , fatigue  5/2/2024 fever and night sweat resolved.   5/4/2024 started methimazole 10 mg daily, for 3 days, got mouth sores and stopped methimazole, prescribed by Dr Jossue Seymour  5/7/-8 no meds.   5/9-10 taking  mg tid  5/11 methimazole 60 mg daily till 5/14, mistook ptu for methimazole .    5/14 HR was feel regular  5/15 methimazole 40 mg daily  5/16-17  confirmed with her pharmacy, she methimazole 10 mg daily  5/18 methimazole 15 mg daily    She has one son at age 5 yo  Had problem conceive since then  Staretd IVF cycle in February but embryo had genetic mutations.   She was started on these meds for +- 9 days: Gonal F, Leuprolide, Menopur 75 units, Pregnyl 14989 units, Dexamethasone 0.5 /day, and 5 days of zomacton  Plan for egg retrieval but delayed due to the thyrotoxicosis  IVF cycle started 2/2024 ended last week of February. About to start another cycle but dx with the thyrotoxicosis..      Energy level: now is a lot better. Feel more tired late at 3-4pm  Eye symptoms: -ve  Heart racing: still feeling its beating stronger compared to baseline.  Tremor: slight tremor  Weight loss/gain: stable  Neck pain: -ve  Hoarseness/: -ve  Dysphagia: -ve  GI: NVDC -ve  Temperature: normal  Period: regular, just a little lighter.  History of iodine exposure/amiodarone/imaging: -ve in the past 1-2 years  History of sickness near TFT check: yes as above    Meds:  methimazole 15 mg daily      Relevant labs:    9/5/17 TSH 1.36  8/31/2020 TSH 0.55  11/13/2020 Hgba1c 5.7%  6/14/2022 TSH 0.62, FT4 1.06, FT3 2.2  11/4/22 TSH 0.55, FT4 1.1, FT3 2.8 , rT3 17.3   4/23 [facility] labs free T3 9.7, free T4 3.5, TSH 0.01, ESR 63, MARIE < 1, TPO < 1, TBG 20.4, TSI < 89%   5/2/2024 FT4 3.71 (0.78-2.19),, TSH <0.02  5/7/2024 TSH <0.02, FT4 2.66 (0.78-2.19), T3 total 243 ()  5/17/2024 TSH 0.029, FT4 1.23, T3 243    5/7/2024 US thyroid shows low blood flow, no nodule, heterogeneous    ROS:  All 12 systems were reviewed and negative except as mentioned in HPI    Past Medical/Surgical History:  Past Medical History:   Diagnosis Date    Acne     Concussion 2013    Multiple food allergies     Thyrotoxicosis 04/2024     Past Surgical History:   Procedure Laterality Date    HC TOOTH EXTRACTION W/FORCEP         Allergies:  Allergies   Allergen Reactions    Beef-Derived Products GI Disturbance     "Corn-Containing Products GI Disturbance    Food [Egg Yolk] GI Disturbance    Lamb's Quarter ([place]) GI Disturbance    Gluten Meal GI Disturbance    Lactase-Lactobacillus GI Disturbance    Nickel Rash    Soybean Oil GI Disturbance       Current meds:   Current Outpatient Medications   Medication Sig Dispense Refill    methimazole (TAPAZOLE) 5 MG tablet Take 15 mg by mouth      Cholecalciferol (VITAMIN D3) 68892 units TABS Take 1 capsule by mouth once a week      MELATONIN PO       Omega-3 Fatty Acids (EPA PLUS PO)       Probiotic Product (PROBIOTIC ADVANCED PO)       UNABLE TO FIND MEDICATION NAME: Mary      UNABLE TO FIND MEDICATION NAME: adrenal support       No current facility-administered medications for this visit.       Family History:  Family History   Problem Relation Age of Onset    Breast Cancer Mother 70    Kidney Cancer Maternal Grandmother     Diabetes Maternal Grandmother     Coronary Artery Disease Maternal Grandmother     Cerebrovascular Disease Maternal Grandmother     Coronary Artery Disease Maternal Grandfather     Diabetes Maternal Grandfather     Obesity Maternal Grandfather     Breast Cancer Paternal Aunt 70    Breast Cancer Other     Prostate Cancer Other        Social History:  Social History     Tobacco Use    Smoking status: Never     Passive exposure: Never    Smokeless tobacco: Never   Substance Use Topics    Alcohol use: No     Alcohol/week: 0.0 standard drinks of alcohol           Pertinent Physical Exam:   /75   Pulse 73   Ht 1.6 m (5' 3\")   Wt 60.8 kg (134 lb)   SpO2 98%   Breastfeeding No   BMI 23.74 kg/m    General: pleasant, NAD  HEENT: normocephalic, atraumatic. Oral mucous membranes moist.   CV: RRR, HR 73  Lungs: unlabored respiration, no cough  ABD: rounded, nondistended  Skin: warm and dry, no obvious lesions  MSK:  moves all extremities, + ve tremor on outstretched hands  Lymp:  no LE edema   Mental status:  alert, oriented to self, place, time  Neuro: cranial " nerve grossly intact  Psych:  calm and appropriate interaction       Pertinent Labs and Imaging:       Latest Ref Rng 9/5/2017  10:15 AM 5/16/2018  9:22 AM 6/14/2022  10:53 AM 11/4/2022  1:37 PM 5/22/2024  4:48 PM   ENDO THYROID LABS-[facility]         TSH 0.40 - 4.00 mU/L 1.36  0.94  0.62  0.55     TSH 0.30 - 4.20 uIU/mL     0.85    Free T3 2.0 - 4.4 pg/mL  2.6  2.2 (L)  2.8     T3, Reverse ng/dL 9.0 - 27.0 ng/dL    17.3     FREE T4 0.90 - 1.70 ng/dL  1.10  1.06  1.10  0.74 (L)       Legend:  (L) Low    EXAM: US THYROID  LOCATION: [facility]  DATE: 5/7/2024     INDICATION: Thyroiditis. Low-grade fever.  COMPARISON: None.  TECHNIQUE: Thyroid ultrasound.      FINDINGS:  RIGHT lobe: 5 x 1.7 x 1.9 cm. Heterogeneous echotexture.  Isthmus: 7 mm.  LEFT lobe: 4 x 1.6 x 1.7 cm. Heterogeneous echotexture.     NODULES: No discrete thyroid nodules or masses.                                                                      IMPRESSION:  The thyroid parenchyma is heterogeneous, however there are no discrete thyroid nodules or masses identified.    Discussed with Attending  Guillermo Gallo MD  Endocrine Fellow  Pager # [phone]

## 2024-05-22 NOTE — LETTER
5/22/2024       RE: Valencia Escalona  [address]  [place] [address]     Dear Colleague,    Thank you for referring your patient, Valencia Escalona, to the [facility] END[facility]CR[facility] [place] at [facility]. Please see a copy of my visit note below.    Attending tie-in statement: Patient seen and examined by me, discussed with fellow whose note I have reviewed and with which I agree .  Key elements and diagnostic points include the following:     Valencia is a 43 year old female seen in consultation at the request of Dr Liang Fernandez for  thyroid  I have reviewed Care Everywhere including [facility]  lab reports, imaging reports and provider notes as indicated. I have reviewed records scanned to the media tab ([facility] [place])    She has already seen Endocrinologist Dr Leonie Seymour but her , a pathology resident, requested second opinion,  She present today along with her  who is a pathology resident in the system.     Son was sick late March/early April, with strep throat- treated with antibiotics x 10 days.   She recalls initially feeling like sore throat but it went lower in the neck. It hurt with hyperxtending the head and swallow. The pain got worse , under neck,/jaw , ears.  Valencia had Negative strep test 4/13   By 4/19 it felt really big , she has photos which show easily visible thyroid outline -- it was tender to palpation   4/19 tender neck left > right; low grade fever to 100.9, chills, HR 75-85 , night sweats  4/21 ibuprofen - helped symptoms greatly   4/22 covid negative   4/23 [facility] labs free T3 9.7, free T4 3.5, TSH 0.01, ESR 63, MARIE < 1, TPO < 1, TBG 20.4, TSI < 89%  4/25 improved neck symptoms  4/27 tremor, fatigue  5/2 resolved fever and night sweats     IVF cycle started 2/2024-- ended last week of Feb  Gonal F  (follitropin alfa0  Leuprolide  Menopur 75  "units (menotropins)  Pregnyl 01743 units (chorionic gonadotropin)  Dexamethasone 0.5 /day-- I didn't get any details on this today.   Zomacton somatropin for GH deficiency   She wants to do another egg retrieval cycle now due to her age and low chance of success.      Endocrine relevant labs are as follows:  7/1/14 TSH 1.28  9/5/17 TSH 1.36  8/31/2020 TSH 0.55  11/13/2020 Hgba1c 5.7%  11/4/22 TSH 0.55, free T4 1.1, free T3 2.8 , reverse T3 17.3 ,Se 235.3 (high )  4/14/23 AMH 1.5 , estradiol 55 , LH 3.9, FSH 7.5   4/17/23 hysterosalpingogram  2/24 IVF cycle   5/2/24 TSH < 0.02, free T4 3.71 ng/dl, Ca 9.9, glucose 147, creatinine 0.65   5/2 lst appt with DR Jossue Seymour - neck pain is improved  5/4 MMI 10 mg/day started --  Mouth sores - stopped MMI after 3 days  5/7/24 free t4 2.66 , T3 243  5/9  mg tid   5/11 took MMI by accident  60 mg   5/14 HR improved   5/15  MMI 40 this was a mistake   5/16 MMI 10  5/17/23 TSH 0.029, free T4 1.23,   5/18 MMI 15 mg/day      Relevant imaging is as follows: (as read by me as it pertains to endocrine relevant organs)  5/7/24 thyroid US:   Slightly heterogeneou/hypoechoic thyroid.  Low flow doppler   Isthmus 0.7 cm   Right lobe 1.7 x 1.9 x 5 cm   Left lobe 1.6 x 1.7 x 4 cm     Now  Not normal  Still alittle tremor   Legs really tired/weak on stairs   Heart still pounds more   Later in the day little waves of feeling sick    Last pregnancy test - ?  Abstinent    GENERAL no mask; NAD  /75   Pulse 73   Ht 1.6 m (5' 3\")   Wt 60.8 kg (134 lb)   SpO2 98%   Breastfeeding No   BMI 23.74 kg/m    SKIN: normal color, temperature, texture   HEENT: PER, no scleral icterus, eyelid retraction, stare, lid lag, proptosis or conjunctival injection.  .    NECK: supple.  No visible or palpable neck masses, cervical adenopathy, or goiter.  Thyroid easily palpable, normal to small size, firm.    LUNGS: clear to auscultation bilaterally.   CARDIAC: RRR, S1, S2 without murmurs, rubs or " gallops.  Heart  is NOT hyperdynamic   BACK: normal spinal contour.    NEURO: Alert, responds appropriately to questions,  moves all extremities, DTRs 2/4, gait normal, + significant coarse  tremor of the outstretched hand     Thyrotoxicosis onset 4/24 with neck symptoms/ pain/ fever /high ESR / negative TSI following viral like/exposure to sick child.  5/7 US had low blood flow more consistent with thyroiditis than hyperthyroidism.  Significant improvement over time but the labs interpretation is complicated by recent and current ATD use.  Labs today  Low threshold to hold ATD and test a few days later.      2..  Tremor is  significant .  Yet, the heart is not hyperdynamic.  As above.      3. Family planning - time urgency to have another IVF cycle discussed.     Addendum  5/22/24 TSH 0.85, free T4 0.74 , --after this,  I sent her note 5/22 PM telling her to hold MMI and repeat labs next Tuesday      60__ minutes spent on the date of the encounter doing chart review, history and exam, documentation and further activities as noted above.    Marianela Tsang MD          Endocrinology Clinic     Valencia Escalona MRN:[id number] YOB: 1980  Primary care provider: Sarah Cisneros     Medical Decision Making:   # Thyrotoxicosis  -initial symptoms: sore throat, swollen and tender neck, fatigue, night sweat, tremor, weight loss  -Initial labs: FT4 3.71 (0.78-2.19),, TSH <0.02, ESR 63, MARIE < 1, TPO < 1, TBG 20.4, TSI < 89%   -US thyroid shows no nodule, low blood flow, heterogeneous  -DD: likely thyroiditis vs grave dz with negative AB, literature also show hcg injection could affect TSH level.   -initially seen by Dr Jossue Seymour, no uptake scan done since did not want to delay her retrieval egg plan. The improvement of her thyroid function could be due to methimazole or just resolving thyroiditis. However based on her initial presentation with neck pain, and low US blood flow, this will suggest  thyroiditis.  Currently taking methimazole 15 mg daily    PLAN:  Repeat TFT, TRAb, TSI, ESR , CRP.   Low threshold to hold methimazole and retest few days after that      History of Present Illness:   Valencia Escalona a 43 year old is here for the first time evaluation of hypothyroidism.    History of problem:  4/2024 son had strep throat  4/13/2024 strep test negative. Neck is painful.  4/19/2024 whole neck tender mostly on left. Also low grade fever, chills. Might have lost some weight by then. Took ibuprofen on 4/21/2024, HR is 75-85 (15 higher than baseline), with night sweat.  Ordered blood test 4/23/2024 and FT4 3.5, FT3 9.7, TSI  4/25/2024 improved the neck swelling  4/27/2024 muscle ext, shaky , fatigue  5/2/2024 fever and night sweat resolved.   5/4/2024 started methimazole 10 mg daily, for 3 days, got mouth sores and stopped methimazole, prescribed by Dr Jossue Seymour  5/7/-8 no meds.   5/9-10 taking  mg tid  5/11 methimazole 60 mg daily till 5/14, mistook ptu for methimazole .    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   She was started on these meds for +- 9 days: Gonal F, Leuprolide, Menopur 75 units, Pregnyl 49229 units, Dexamethasone 0.5 /day, and 5 days of zomacton  Plan for egg retrieval but delayed due to the thyrotoxicosis  IVF cycle started 2/2024 ended last week of February. About to start another cycle but dx with the thyrotoxicosis..      Energy level: now is a lot better. Feel more tired late at 3-4pm  Eye symptoms: -ve  Heart racing: still feeling its beating stronger compared to baseline.  Tremor: slight tremor  Weight loss/gain: stable  Neck pain: -ve  Hoarseness/: -ve  Dysphagia: -ve  GI: NVDC -ve  Temperature: normal  Period: regular,  just a little lighter.  History of iodine exposure/amiodarone/imaging: -ve in the past 1-2 years  History of sickness near TFT check: yes as above    Meds:  methimazole 15 mg daily      Relevant labs:    9/5/17 TSH 1.36  8/31/2020 TSH 0.55  11/13/2020 Hgba1c 5.7%  6/14/2022 TSH 0.62, FT4 1.06, FT3 2.2  11/4/22 TSH 0.55, FT4 1.1, FT3 2.8 , rT3 17.3   4/23 [facility] labs free T3 9.7, free T4 3.5, TSH 0.01, ESR 63, MARIE < 1, TPO < 1, TBG 20.4, TSI < 89%   5/2/2024 FT4 3.71 (0.78-2.19),, TSH <0.02  5/7/2024 TSH <0.02, FT4 2.66 (0.78-2.19), T3 total 243 ()  5/17/2024 TSH 0.029, FT4 1.23, T3 243    5/7/2024 US thyroid shows low blood flow, no nodule, heterogeneous    ROS:  All 12 systems were reviewed and negative except as mentioned in HPI    Past Medical/Surgical History:  Past Medical History:   Diagnosis Date    Acne     Concussion 2013    Multiple food allergies     Thyrotoxicosis 04/2024     Past Surgical History:   Procedure Laterality Date    HC TOOTH EXTRACTION W/FORCEP         Allergies:  Allergies   Allergen Reactions    Beef-Derived Products GI Disturbance    Corn-Containing Products GI Disturbance    Food [Egg Yolk] GI Disturbance    Lamb's Quarter ([place]) GI Disturbance    Gluten Meal GI Disturbance    Lactase-Lactobacillus GI Disturbance    Nickel Rash    Soybean Oil GI Disturbance       Current meds:   Current Outpatient Medications   Medication Sig Dispense Refill    methimazole (TAPAZOLE) 5 MG tablet Take 15 mg by mouth      Cholecalciferol (VITAMIN D3) 39195 units TABS Take 1 capsule by mouth once a week      MELATONIN PO       Omega-3 Fatty Acids (EPA PLUS PO)       Probiotic Product (PROBIOTIC ADVANCED PO)       UNABLE TO FIND MEDICATION NAME: Mary      UNABLE TO FIND MEDICATION NAME: adrenal support       No current facility-administered medications for this visit.       Family History:  Family History   Problem Relation Age of Onset    Breast Cancer Mother 70    Kidney Cancer Maternal Grandmother   "   Diabetes Maternal Grandmother     Coronary Artery Disease Maternal Grandmother     Cerebrovascular Disease Maternal Grandmother     Coronary Artery Disease Maternal Grandfather     Diabetes Maternal Grandfather     Obesity Maternal Grandfather     Breast Cancer Paternal Aunt 70    Breast Cancer Other     Prostate Cancer Other        Social History:  Social History     Tobacco Use    Smoking status: Never     Passive exposure: Never    Smokeless tobacco: Never   Substance Use Topics    Alcohol use: No     Alcohol/week: 0.0 standard drinks of alcohol           Pertinent Physical Exam:   /75   Pulse 73   Ht 1.6 m (5' 3\")   Wt 60.8 kg (134 lb)   SpO2 98%   Breastfeeding No   BMI 23.74 kg/m    General: pleasant, NAD  HEENT: normocephalic, atraumatic. Oral mucous membranes moist.   CV: RRR, HR 73  Lungs: unlabored respiration, no cough  ABD: rounded, nondistended  Skin: warm and dry, no obvious lesions  MSK:  moves all extremities, + ve tremor on outstretched hands  Lymp:  no LE edema   Mental status:  alert, oriented to self, place, time  Neuro: cranial nerve grossly intact  Psych:  calm and appropriate interaction       Pertinent Labs and Imaging:       Latest Ref Rng 9/5/2017  10:15 AM 5/16/2018  9:22 AM 6/14/2022  10:53 AM 11/4/2022  1:37 PM 5/22/2024  4:48 PM   ENDO THYROID LABS-[facility]         TSH 0.40 - 4.00 mU/L 1.36  0.94  0.62  0.55     TSH 0.30 - 4.20 uIU/mL     0.85    Free T3 2.0 - 4.4 pg/mL  2.6  2.2 (L)  2.8     T3, Reverse ng/dL 9.0 - 27.0 ng/dL    17.3     FREE T4 0.90 - 1.70 ng/dL  1.10  1.06  1.10  0.74 (L)       Legend:  (L) Low    EXAM: US THYROID  LOCATION: [facility]  DATE: 5/7/2024     INDICATION: Thyroiditis. Low-grade fever.  COMPARISON: None.  TECHNIQUE: Thyroid ultrasound.      FINDINGS:  RIGHT lobe: 5 x 1.7 x 1.9 cm. Heterogeneous echotexture.  Isthmus: 7 mm.  LEFT lobe: 4 x 1.6 x 1.7 cm. Heterogeneous echotexture.     NODULES: No discrete thyroid nodules " or masses.                                                                      IMPRESSION:  The thyroid parenchyma is heterogeneous, however there are no discrete thyroid nodules or masses identified.    Discussed with Attending  Guillermo Gallo MD  Endocrine Fellow  Pager # [phone]

## 2024-05-22 NOTE — PROGRESS NOTES
Attending tie-in statement: Patient seen and examined by me, discussed with fellow whose note I have reviewed and with which I agree .  Key elements and diagnostic points include the following:     Valencia is a 43 year old female seen in consultation at the request of Dr Liang Fernandez for  thyroid  I have reviewed Care Everywhere including [facility]  lab reports, imaging reports and provider notes as indicated. I have reviewed records scanned to the media tab ([facility] [place])    She has already seen Endocrinologist Dr Leonie Seymour but her , a pathology resident, requested second opinion,  She present today along with her  who is a pathology resident in the system.     Son was sick late March/early April, with strep throat- treated with antibiotics x 10 days.   She recalls initially feeling like sore throat but it went lower in the neck. It hurt with hyperxtending the head and swallow. The pain got worse , under neck,/jaw , ears.  Valencia had Negative strep test 4/13   By 4/19 it felt really big , she has photos which show easily visible thyroid outline -- it was tender to palpation   4/19 tender neck left > right; low grade fever to 100.9, chills, HR 75-85 , night sweats  4/21 ibuprofen - helped symptoms greatly   4/22 covid negative   4/23 [facility] labs free T3 9.7, free T4 3.5, TSH 0.01, ESR 63, MARIE < 1, TPO < 1, TBG 20.4, TSI < 89%  4/25 improved neck symptoms  4/27 tremor, fatigue  5/2 resolved fever and night sweats     IVF cycle started 2/2024-- ended last week of Feb  Gonal F  (follitropin alfa0  Leuprolide  Menopur 75 units (menotropins)  Pregnyl 17495 units (chorionic gonadotropin)  Dexamethasone 0.5 /day-- I didn't get any details on this today.   Zomacton somatropin for GH deficiency   She wants to do another egg retrieval cycle now due to her age and low chance of success.      Endocrine relevant labs are as follows:  7/1/14 TSH 1.28  9/5/17 TSH  "1.36  8/31/2020 TSH 0.55  11/13/2020 Hgba1c 5.7%  11/4/22 TSH 0.55, free T4 1.1, free T3 2.8 , reverse T3 17.3 ,Se 235.3 (high )  4/14/23 AMH 1.5 , estradiol 55 , LH 3.9, FSH 7.5   4/17/23 hysterosalpingogram  2/24 IVF cycle   5/2/24 TSH < 0.02, free T4 3.71 ng/dl, Ca 9.9, glucose 147, creatinine 0.65   5/2 lst appt with DR Jossue Seymour - neck pain is improved  5/4 MMI 10 mg/day started --  Mouth sores - stopped MMI after 3 days  5/7/24 free t4 2.66 , T3 243  5/9  mg tid   5/11 took MMI by accident  60 mg   5/14 HR improved   5/15  MMI 40 this was a mistake   5/16 MMI 10  5/17/23 TSH 0.029, free T4 1.23,   5/18 MMI 15 mg/day      Relevant imaging is as follows: (as read by me as it pertains to endocrine relevant organs)  5/7/24 thyroid US:   Slightly heterogeneou/hypoechoic thyroid.  Low flow doppler   Isthmus 0.7 cm   Right lobe 1.7 x 1.9 x 5 cm   Left lobe 1.6 x 1.7 x 4 cm     Now  Not normal  Still alittle tremor   Legs really tired/weak on stairs   Heart still pounds more   Later in the day little waves of feeling sick    Last pregnancy test - ?  Abstinent    GENERAL no mask; NAD  /75   Pulse 73   Ht 1.6 m (5' 3\")   Wt 60.8 kg (134 lb)   SpO2 98%   Breastfeeding No   BMI 23.74 kg/m    SKIN: normal color, temperature, texture   HEENT: PER, no scleral icterus, eyelid retraction, stare, lid lag, proptosis or conjunctival injection.  .    NECK: supple.  No visible or palpable neck masses, cervical adenopathy, or goiter.  Thyroid easily palpable, normal to small size, firm.    LUNGS: clear to auscultation bilaterally.   CARDIAC: RRR, S1, S2 without murmurs, rubs or gallops.  Heart  is NOT hyperdynamic   BACK: normal spinal contour.    NEURO: Alert, responds appropriately to questions,  moves all extremities, DTRs 2/4, gait normal, + significant coarse  tremor of the outstretched hand     Thyrotoxicosis onset 4/24 with neck symptoms/ pain/ fever /high ESR / negative TSI following viral like/exposure " to sick child.  5/7 US had low blood flow more consistent with thyroiditis than hyperthyroidism.  Significant improvement over time but the labs interpretation is complicated by recent and current ATD use.  Labs today  Low threshold to hold ATD and test a few days later.      2..  Tremor is  significant .  Yet, the heart is not hyperdynamic.  As above.      3. Family planning - time urgency to have another IVF cycle discussed.     Addendum  5/22/24 TSH 0.85, free T4 0.74 , --after this,  I sent her note 5/22 PM telling her to hold MMI and repeat labs next Tuesday  CRP < 3, ESR 10, T3 48, TSI <1, TRAB < 1.10  5/28/24 TSh 8.51, free T4 0.68, t3 57. Confirms thyroiditis if she has been off MMI since 5/23.   7/11/23 TSh 3.12, free t4 1.02    60__ minutes spent on the date of the encounter doing chart review, history and exam, documentation and further activities as noted above.    Marianela Tsang MD

## 2024-05-23 PROBLEM — R25.1 TREMOR: Status: ACTIVE | Noted: 2024-05-23

## 2024-05-23 PROBLEM — E05.90 THYROTOXICOSIS WITHOUT THYROID STORM, UNSPECIFIED THYROTOXICOSIS TYPE: Status: ACTIVE | Noted: 2024-05-23

## 2024-05-23 PROBLEM — Z30.09 FAMILY PLANNING: Status: ACTIVE | Noted: 2024-05-23

## 2024-05-23 LAB — T3 SERPL-MCNC: 48 NG/DL (ref 85–202)

## 2024-05-24 LAB — MAYO MISC RESULT: NORMAL

## 2024-05-28 ENCOUNTER — LAB (OUTPATIENT)
Dept: LAB | Facility: CLINIC | Age: 44
End: 2024-05-28
Payer: COMMERCIAL

## 2024-05-28 DIAGNOSIS — E05.90 THYROTOXICOSIS WITHOUT THYROID STORM, UNSPECIFIED THYROTOXICOSIS TYPE: ICD-10-CM

## 2024-05-28 LAB — TSI SER-ACNC: <1 TSI INDEX

## 2024-05-28 PROCEDURE — 84439 ASSAY OF FREE THYROXINE: CPT

## 2024-05-28 PROCEDURE — 84480 ASSAY TRIIODOTHYRONINE (T3): CPT

## 2024-05-28 PROCEDURE — 36415 COLL VENOUS BLD VENIPUNCTURE: CPT

## 2024-05-28 PROCEDURE — 84443 ASSAY THYROID STIM HORMONE: CPT

## 2024-05-29 DIAGNOSIS — E03.8 OTHER SPECIFIED HYPOTHYROIDISM: Primary | ICD-10-CM

## 2024-05-29 LAB
T3 SERPL-MCNC: 57 NG/DL (ref 85–202)
T4 FREE SERPL-MCNC: 0.68 NG/DL (ref 0.9–1.7)
TSH SERPL DL<=0.005 MIU/L-ACNC: 8.51 UIU/ML (ref 0.3–4.2)

## 2024-07-11 ENCOUNTER — LAB (OUTPATIENT)
Dept: LAB | Facility: CLINIC | Age: 44
End: 2024-07-11
Payer: COMMERCIAL

## 2024-07-11 DIAGNOSIS — E03.8 OTHER SPECIFIED HYPOTHYROIDISM: ICD-10-CM

## 2024-07-11 LAB
T4 FREE SERPL-MCNC: 1.02 NG/DL (ref 0.9–1.7)
TSH SERPL DL<=0.005 MIU/L-ACNC: 3.12 UIU/ML (ref 0.3–4.2)

## 2024-07-11 PROCEDURE — 36415 COLL VENOUS BLD VENIPUNCTURE: CPT

## 2024-07-11 PROCEDURE — 84439 ASSAY OF FREE THYROXINE: CPT

## 2024-07-11 PROCEDURE — 84443 ASSAY THYROID STIM HORMONE: CPT

## 2024-07-24 ENCOUNTER — VIRTUAL VISIT (OUTPATIENT)
Dept: URGENT CARE | Facility: CLINIC | Age: 44
End: 2024-07-24
Payer: COMMERCIAL

## 2024-07-24 ENCOUNTER — TELEPHONE (OUTPATIENT)
Dept: FAMILY MEDICINE | Facility: CLINIC | Age: 44
End: 2024-07-24

## 2024-07-24 DIAGNOSIS — U07.1 SARS-COV-2 POSITIVE: Primary | ICD-10-CM

## 2024-07-24 PROCEDURE — 99441 PR PHYSICIAN TELEPHONE EVALUATION 5-10 MIN: CPT | Mod: 93

## 2024-07-24 NOTE — PROGRESS NOTES
"Assessment:    SARS-CoV-2 positive  COVID-19 positive patient.  Encounter for consideration of medication intervention.    Patient does qualify for a prescription.   Full discussion with patient including medication options, risks and benefits.   Potential drug interactions reviewed with patient.      Plan:  Treatment planned   Paxlovid will be sent in to preferred pharmacy.     Temporary change to home medications:   None    Jonatan Birmingham  is a 43 year old  who has a confirmed new positive COVID-19 diagnosis.      She has been identified as high risk for complications of this infection, and is being evaluated via a billable     Phone visit.      Phone call duration: 9 minutes  Patient location: Home  Provider location: Home    Concern for COVID-19  When did symptoms begin? 7/22/24    Positive COVID test? Yes    Symptoms (Cough, trouble breathing, fever, chills, headache, sore throat, chest pain, diarrhea, body aches)?   Fatigue, headache, congestion           Constitutional, HEENT, cardiovascular, pulmonary, gi and gu systems are negative, except as otherwise noted.    Objective    Vitals:  No vitals were obtained today due to virtual visit.  Estimated body mass index is 23.74 kg/m  as calculated from the following:    Height as of 5/22/24: 1.6 m (5' 3\").    Weight as of 5/22/24: 60.8 kg (134 lb).   General: Alert, no apparent distress  PSYCH: Alert; coherent speech, normal rate and volume, able to articulate logical thoughts, appropriate insight, no tangential thoughts, no hallucination or delusions.  Affect is appropriate  RESP: No cough, no audible wheezing, able to talk in full sentences  Remainder of exam unable to be completed due to telephone visit  GFR Estimate   Date Value Ref Range Status   06/14/2022 89 >60 mL/min/1.73m2 Final     Comment:     Effective December 21, 2021 eGFRcr in adults is calculated using the 2021 CKD-EPI creatinine equation which includes age and gender (Roosevelt et al., NEJM, " "DOI: 10.1056/KRERha4919272)   05/16/2018 78 >60 mL/min/1.7m2 Final     Comment:     Non  GFR Calc            The patient has been notified of following:     \"This telephone visit will be conducted via a call between you and your physician/provider. We have found that certain health care needs can be provided without the need for a physical exam.  This service lets us provide the care you need with a short phone conversation.  If a prescription is necessary we can send it directly to your pharmacy.  If lab work is needed we can place an order for that and you can then stop by our lab to have the test done at a later time.    Telephone visits are billed at different rates depending on your insurance coverage. During this emergency period, for some insurers they may be billed the same as an in-person visit.  Please reach out to your insurance provider with any questions.    If during the course of the call the physician/provider feels a telephone visit is not appropriate, you will not be charged for this service.\"    Patient has given verbal consent for Telephone visit?  Yes    "

## 2024-07-24 NOTE — TELEPHONE ENCOUNTER
RN COVID TREATMENT VISIT  07/24/24      The patient has been triaged and does not require a higher level of care.    Valencia Escalona  43 year old  Current weight? 135 lbs    Has the patient been seen by a primary care provider at an [facility] or [facility] within the past two years? Yes.   Have you been in close proximity to/do you have a known exposure to a person with a confirmed case of influenza? No.     General treatment eligibility:  Date of positive COVID test (PCR or at home)?  7/24/24    Are you or have you been hospitalized for this COVID-19 infection? No.   Have you received monoclonal antibodies or antiviral treatment for COVID-19 since this positive test? No.   Do you have any of the following conditions that place you at risk of being very sick from COVID-19?   No high risk conditions. Patient informed they do not qualify for treatment.   Kamla Linton RN     VV Covid appointment scheduled.

## 2024-07-31 ENCOUNTER — OFFICE VISIT (OUTPATIENT)
Dept: FAMILY MEDICINE | Facility: CLINIC | Age: 44
End: 2024-07-31

## 2024-07-31 VITALS
HEIGHT: 63 IN | OXYGEN SATURATION: 99 % | HEART RATE: 58 BPM | BODY MASS INDEX: 24.06 KG/M2 | SYSTOLIC BLOOD PRESSURE: 117 MMHG | WEIGHT: 135.8 LBS | DIASTOLIC BLOOD PRESSURE: 84 MMHG

## 2024-07-31 DIAGNOSIS — R92.343 EXTREMELY DENSE TISSUE OF BOTH BREASTS ON MAMMOGRAPHY: ICD-10-CM

## 2024-07-31 DIAGNOSIS — Z87.820 H/O CONCUSSION: ICD-10-CM

## 2024-07-31 DIAGNOSIS — Z86.39: Primary | ICD-10-CM

## 2024-07-31 DIAGNOSIS — Z31.83 PATIENT UNDERGOING IN VITRO FERTILIZATION: ICD-10-CM

## 2024-07-31 PROBLEM — Z30.09 FAMILY PLANNING: Status: RESOLVED | Noted: 2024-05-23 | Resolved: 2024-07-31

## 2024-07-31 PROBLEM — M25.579 PAIN IN JOINT, ANKLE AND FOOT, UNSPECIFIED LATERALITY: Status: RESOLVED | Noted: 2020-07-23 | Resolved: 2024-07-31

## 2024-07-31 PROBLEM — E05.90 THYROTOXICOSIS WITHOUT THYROID STORM, UNSPECIFIED THYROTOXICOSIS TYPE: Status: RESOLVED | Noted: 2024-05-23 | Resolved: 2024-07-31

## 2024-07-31 PROBLEM — Z00.00 HEALTH CARE MAINTENANCE: Status: RESOLVED | Noted: 2023-08-22 | Resolved: 2024-07-31

## 2024-07-31 PROBLEM — F07.81 POSTCONCUSSION SYNDROME: Status: RESOLVED | Noted: 2018-05-16 | Resolved: 2024-07-31

## 2024-07-31 PROBLEM — M99.05 SOMATIC DYSFUNCTION OF PELVIS REGION: Status: RESOLVED | Noted: 2018-06-24 | Resolved: 2024-07-31

## 2024-07-31 PROBLEM — M22.2X2 PATELLOFEMORAL PAIN SYNDROME OF BOTH KNEES: Status: RESOLVED | Noted: 2020-06-04 | Resolved: 2024-07-31

## 2024-07-31 PROBLEM — M79.10 MYALGIA: Status: RESOLVED | Noted: 2018-06-24 | Resolved: 2024-07-31

## 2024-07-31 PROBLEM — E55.9 VITAMIN D DEFICIENCY: Status: RESOLVED | Noted: 2018-05-16 | Resolved: 2024-07-31

## 2024-07-31 PROBLEM — M22.2X1 PATELLOFEMORAL PAIN SYNDROME OF BOTH KNEES: Status: RESOLVED | Noted: 2020-06-04 | Resolved: 2024-07-31

## 2024-07-31 PROBLEM — M25.552 HIP PAIN, LEFT: Status: RESOLVED | Noted: 2018-06-24 | Resolved: 2024-07-31

## 2024-07-31 PROCEDURE — G2211 COMPLEX E/M VISIT ADD ON: HCPCS | Performed by: FAMILY MEDICINE

## 2024-07-31 PROCEDURE — 99203 OFFICE O/P NEW LOW 30 MIN: CPT | Performed by: FAMILY MEDICINE

## 2024-07-31 NOTE — PROGRESS NOTES
"SUBJECTIVE:    Valencia Escalona, is a 44 year old female presenting for the below:     -H/o hyperthyroidism / sub acute thyroiditis : April 2024 unclear trigger. Recently discharge from endocrinology ([facility]) surveillance after 1 x TSH within normal limits.   -undergoing IVF : egg retrieval tomorrow (3 rd round). Has 3 year old naturally conceived with same partner at age 38. Follows with [facility] [place] (DAJUAN signed)   -Mammo (3D): 12/2023 first mammo :- extremely dense breasts: No first degree relatives with breast cancer.     OBJECTIVE:  Vitals:    07/31/24 0930   BP: 117/84   Pulse: 58   SpO2: 99%   Weight: 61.6 kg (135 lb 12.8 oz)   Height: 1.6 m (5' 3\")    Body mass index is 24.06 kg/m .  General: no acute distress, cooperative with exam.     ASSESSMENT / PLAN:        H/O subacute thyroiditis  Recheck to ensure normalization of TSH as undergoing IVF : will complete in 2 weeks : future lab pended.   -     TSH; Future    Patient undergoing in vitro fertilization  Egg retrieval tomorrow (3 rd round). Has 5 year old naturally conceived with same partner at age 38. Follows with [facility] [place] (DAJUAN signed)     Extremely dense tissue of both breasts on mammography  Discussed importance of annual 3D mammograms     H/o concussion  2013: during soccer match: Did complete [facility] programs, including vestibular rehab and cognitive assessments. Has also worked with integrative medicine. Some residual symptoms. Managing with ADLs well.    Follow up:  Annual physical around fall time.     The longitudinal plan of care for the diagnosis(es)/condition(s) as documented were addressed during this visit. Due to the added complexity in care, I will continue to support Valencia in the subsequent management and with ongoing continuity of care.    "

## 2024-08-14 DIAGNOSIS — Z86.39: ICD-10-CM

## 2024-08-14 LAB — TSH SERPL DL<=0.005 MIU/L-ACNC: 2.63 UIU/ML (ref 0.3–4.2)

## 2024-08-14 PROCEDURE — 36415 COLL VENOUS BLD VENIPUNCTURE: CPT

## 2024-08-14 PROCEDURE — 84443 ASSAY THYROID STIM HORMONE: CPT

## 2024-08-14 PROCEDURE — 84443 ASSAY THYROID STIM HORMONE: CPT | Mod: 90

## 2025-01-08 NOTE — PATIENT INSTRUCTIONS
Patient Education   Preventive Care Advice   This is general advice given by our system to help you stay healthy. However, your care team may have specific advice just for you. Please talk to your care team about your preventive care needs.  Nutrition  Eat 5 or more servings of fruits and vegetables each day.  Try wheat bread, brown rice and whole grain pasta (instead of white bread, rice, and pasta).  Get enough calcium and vitamin D. Check the label on foods and aim for 100% of the RDA (recommended daily allowance).  Lifestyle  Exercise at least 150 minutes each week  (30 minutes a day, 5 days a week).  Do muscle strengthening activities 2 days a week. These help control your weight and prevent disease.  No smoking.  Wear sunscreen to prevent skin cancer.  Have a dental exam and cleaning every 6 months.  Yearly exams  See your health care team every year to talk about:  Any changes in your health.  Any medicines your care team has prescribed.  Preventive care, family planning, and ways to prevent chronic diseases.  Shots (vaccines)   HPV shots (up to age 26), if you've never had them before.  Hepatitis B shots (up to age 59), if you've never had them before.  COVID-19 shot: Get this shot when it's due.  Flu shot: Get a flu shot every year.  Tetanus shot: Get a tetanus shot every 10 years.  Pneumococcal, hepatitis A, and RSV shots: Ask your care team if you need these based on your risk.  Shingles shot (for age 50 and up)  General health tests  Diabetes screening:  Starting at age 35, Get screened for diabetes at least every 3 years.  If you are younger than age 35, ask your care team if you should be screened for diabetes.  Cholesterol test: At age 39, start having a cholesterol test every 5 years, or more often if advised.  Bone density scan (DEXA): At age 50, ask your care team if you should have this scan for osteoporosis (brittle bones).  Hepatitis C: Get tested at least once in your life.  STIs (sexually  transmitted infections)  Before age 24: Ask your care team if you should be screened for STIs.  After age 24: Get screened for STIs if you're at risk. You are at risk for STIs (including HIV) if:  You are sexually active with more than one person.  You don't use condoms every time.  You or a partner was diagnosed with a sexually transmitted infection.  If you are at risk for HIV, ask about PrEP medicine to prevent HIV.  Get tested for HIV at least once in your life, whether you are at risk for HIV or not.  Cancer screening tests  Cervical cancer screening: If you have a cervix, begin getting regular cervical cancer screening tests starting at age 21.  Breast cancer scan (mammogram): If you've ever had breasts, begin having regular mammograms starting at age 40. This is a scan to check for breast cancer.  Colon cancer screening: It is important to start screening for colon cancer at age 45.  Have a colonoscopy test every 10 years (or more often if you're at risk) Or, ask your provider about stool tests like a FIT test every year or Cologuard test every 3 years.  To learn more about your testing options, visit:   .  For help making a decision, visit:   https://bit.ly/dt29392.  Prostate cancer screening test: If you have a prostate, ask your care team if a prostate cancer screening test (PSA) at age 55 is right for you.  Lung cancer screening: If you are a current or former smoker ages 50 to 80, ask your care team if ongoing lung cancer screenings are right for you.  For informational purposes only. Not to replace the advice of your health care provider. Copyright   2023 [employer]. All rights reserved. Clinically reviewed by the [facility] Transitions Program. Nautit [id number] - REV 01/24.

## 2025-01-09 ENCOUNTER — OFFICE VISIT (OUTPATIENT)
Dept: FAMILY MEDICINE | Facility: CLINIC | Age: 45
End: 2025-01-09

## 2025-01-09 VITALS
HEART RATE: 63 BPM | SYSTOLIC BLOOD PRESSURE: 118 MMHG | DIASTOLIC BLOOD PRESSURE: 81 MMHG | BODY MASS INDEX: 25.91 KG/M2 | WEIGHT: 146.2 LBS | HEIGHT: 63 IN | OXYGEN SATURATION: 99 %

## 2025-01-09 DIAGNOSIS — Z13.6 SCREENING FOR CARDIOVASCULAR CONDITION: ICD-10-CM

## 2025-01-09 DIAGNOSIS — N95.1 PERIMENOPAUSE: ICD-10-CM

## 2025-01-09 DIAGNOSIS — Z86.39: ICD-10-CM

## 2025-01-09 DIAGNOSIS — Z00.00 ROUTINE GENERAL MEDICAL EXAMINATION AT A HEALTH CARE FACILITY: Primary | ICD-10-CM

## 2025-01-09 LAB
ANION GAP SERPL CALCULATED.3IONS-SCNC: 10 MMOL/L (ref 7–15)
BUN SERPL-MCNC: 11 MG/DL (ref 6–20)
CALCIUM SERPL-MCNC: 9.3 MG/DL (ref 8.8–10.4)
CHLORIDE SERPL-SCNC: 103 MMOL/L (ref 98–107)
CHOLESTEROL: 157 MG/DL (ref 100–199)
CREAT SERPL-MCNC: 0.8 MG/DL (ref 0.51–0.95)
EGFRCR SERPLBLD CKD-EPI 2021: >90 ML/MIN/1.73M2
FASTING STATUS PATIENT QL REPORTED: YES
FASTING?: YES
GLUCOSE SERPL-MCNC: 100 MG/DL (ref 70–99)
HCO3 SERPL-SCNC: 24 MMOL/L (ref 22–29)
HDL (RMG): 57 MG/DL (ref 40–?)
LDL CALCULATED (RMG): 90 MG/DL (ref 0–130)
POTASSIUM SERPL-SCNC: 3.7 MMOL/L (ref 3.4–5.3)
SODIUM SERPL-SCNC: 137 MMOL/L (ref 135–145)
TRIGLYCERIDES (RMG): 53 MG/DL (ref 0–149)
TSH SERPL DL<=0.005 MIU/L-ACNC: 2.52 UIU/ML (ref 0.3–4.2)

## 2025-01-09 PROCEDURE — 84443 ASSAY THYROID STIM HORMONE: CPT | Performed by: FAMILY MEDICINE

## 2025-01-09 PROCEDURE — 80048 BASIC METABOLIC PNL TOTAL CA: CPT | Performed by: FAMILY MEDICINE

## 2025-01-09 PROCEDURE — 82435 ASSAY OF BLOOD CHLORIDE: CPT | Performed by: FAMILY MEDICINE

## 2025-01-09 RX ORDER — OMEGA-3/DHA/EPA/FISH OIL 60 MG-90MG
CAPSULE ORAL
COMMUNITY

## 2025-01-09 RX ORDER — UBIDECARENONE 30 MG
CAPSULE ORAL
COMMUNITY

## 2025-01-09 RX ORDER — MULTIPLE VITAMINS W/ MINERALS TAB 9MG-400MCG
1 TAB ORAL DAILY
COMMUNITY

## 2025-01-09 RX ORDER — MULTIVITAMIN WITH IRON
1 TABLET ORAL DAILY
COMMUNITY

## 2025-01-09 SDOH — HEALTH STABILITY: PHYSICAL HEALTH: ON AVERAGE, HOW MANY MINUTES DO YOU ENGAGE IN EXERCISE AT THIS LEVEL?: 40 MIN

## 2025-01-09 SDOH — HEALTH STABILITY: PHYSICAL HEALTH: ON AVERAGE, HOW MANY DAYS PER WEEK DO YOU ENGAGE IN MODERATE TO STRENUOUS EXERCISE (LIKE A BRISK WALK)?: 4 DAYS

## 2025-01-09 ASSESSMENT — SOCIAL DETERMINANTS OF HEALTH (SDOH): HOW OFTEN DO YOU GET TOGETHER WITH FRIENDS OR RELATIVES?: THREE TIMES A WEEK

## 2025-01-09 NOTE — PROGRESS NOTES
Female Preventive Health Visit    SUBJECTIVE:    This 44 year old female presents for a routine preventive physical exam.    The patient has the following concerns:     Wonders if in perimenopause : insomnia, fragmented sleep, weight gain, abdominal bloating. No longer completing IVF cycles (last was August). Continued menses, but with some shortening of cycle. Last month unusually light. No hot flashes or night sweats. No vaginal dryness. Most troublesome ; staying asleep (no issued with sleep initiation).       Patient's medications, allergies, past medical, surgical and family histories were reviewed and updated as appropriate.    OB/Gyn History:    Last Pap Smear:  UTD  Follows with [facility] [place] (DAJUAN signed)       Health Maintenance:  Health maintenance alerts were reviewed and updated as appropriate.  Breast cancer screening: UTD  Colorectal cancer screening: UTD          1/9/2025   General Health   How would you rate your overall physical health? Good   Feel stress (tense, anxious, or unable to sleep) Only a little   (!) STRESS CONCERN      1/9/2025   Nutrition   Three or more servings of calcium each day? Yes   Diet: Gluten-free/reduced    Other   If other, please elaborate: Dairy free, egg free, corn , soy, other sensitivites   How many servings of fruit and vegetables per day? (!) 0-1   How many sweetened beverages each day? 0-1       Multiple values from one day are sorted in reverse-chronological order         1/9/2025   Exercise   Days per week of moderate/strenous exercise 4 days   Average minutes spent exercising at this level 40 min         1/9/2025   Social Factors   Frequency of gathering with friends or relatives Three times a week   Worry food won't last until get money to buy more No   Food not last or not have enough money for food? No   Do you have housing? (Housing is defined as stable permanent housing and does not include staying ouside in a car, in a tent, in an abandoned  "building, in an overnight shelter, or couch-surfing.) Yes   Are you worried about losing your housing? No   Lack of transportation? No   Unable to get utilities (heat,electricity)? No         1/9/2025   Dental   Dentist two times every year? Yes         1/9/2025   TB Screening   Were you born outside of the [place]? No         Today's PHQ-2 Score:       1/9/2025    10:37 AM   PHQ-2 ( 1999 [employer])   Q1: Little interest or pleasure in doing things 0   Q2: Feeling down, depressed or hopeless 0   PHQ-2 Score 0           1/9/2025   Substance Use   Alcohol more than 3/day or more than 7/wk Not Applicable   Do you use any other substances recreationally? No         1/9/2025   Breast Cancer Screening   Family history of breast, colon, or ovarian cancer? Yes         1/9/2025   LAST FHS-7 RESULTS   1st degree relative breast or ovarian cancer Yes    Any relative bilateral breast cancer No    Any male have breast cancer No    Any ONE woman have BOTH breast AND ovarian cancer No    Any woman with breast cancer before 50yrs No    2 or more relatives with breast AND/OR ovarian cancer No    2 or more relatives with breast AND/OR bowel cancer No               1/9/2025   STI Screening   New sexual partner(s) since last STI/HIV test? No           1/9/2025   Contraception/Family Planning   Questions about contraception or family planning No         OBJECTIVE:  Vitals:    01/09/25 1037   BP: 118/81   Pulse: 63   SpO2: 99%   Weight: 66.3 kg (146 lb 3.2 oz)   Height: 1.6 m (5' 3\")    Body mass index is 25.9 kg/m .  General: no acute distress, cooperative with exam.  HEENT:  PERRLA. Bilateral TM's, external canals, oropharynx normal.    Neck:  Supple, no lymphadenopathy or thyromegaly   Breasts: breasts appear normal, no suspicious masses, no skin or nipple changes  Lungs: clear to auscultation bilaterally, normal respiratory effort.  Heart:  RRR without murmurs, rubs or gallops.  Normal S1 and S2.  Abdomen: normal bowel sounds, nontender, no " "palpable organomegaly.  Skin:  No lesions.  No rashes.  Extremities: warm, perfused, no swelling or edema.  Neuro:  CN II-XII intact, motor & sensory function all intact.    Psych: mental status normal, mood and affect appropriate.        9/22/2015     3:21 PM 3/28/2023     2:53 PM   PHQ   PHQ-9 Total Score 0 0   Q9: Thoughts of better off dead/self-harm past 2 weeks Not at all Not at all       ASSESSMENT / PLAN:  This 44 year old female presents for a routine preventive physical exam. Preventive health topics discussed including adequate exercise and healthy diet. Return to clinic in one year for preventative exam or sooner with any other concerns.  Other issues discussed as noted below.      Routine general medical examination at a health care facility  -     Basic metabolic panel; Future  -     VENOUS COLLECTION    Perimenopause  Symptoms discussed and management strategies. Will look into reading 'the new rules of menopause\". Discussed FDA approved indications for HRT initiation. Monitor for now. Continue strength training.     H/O subacute thyroiditis  -     TSH; Future    Screening for cardiovascular condition  -     Lipid Profile ([facility])      "

## 2025-02-06 ENCOUNTER — TRANSFERRED RECORDS (OUTPATIENT)
Dept: HEALTH INFORMATION MANAGEMENT | Facility: CLINIC | Age: 45
End: 2025-02-06
Payer: COMMERCIAL

## 2025-08-13 ENCOUNTER — LAB REQUISITION (OUTPATIENT)
Dept: LAB | Facility: CLINIC | Age: 45
End: 2025-08-13

## 2025-08-13 DIAGNOSIS — Z12.4 ENCOUNTER FOR SCREENING FOR MALIGNANT NEOPLASM OF CERVIX: ICD-10-CM

## 2025-08-13 PROCEDURE — G0145 SCR C/V CYTO,THINLAYER,RESCR: HCPCS | Performed by: OBSTETRICS & GYNECOLOGY

## 2025-08-18 LAB
BKR LAB AP GYN ADEQUACY: NORMAL
BKR LAB AP GYN INTERPRETATION: NORMAL
BKR LAB AP HPV REFLEX: NORMAL
BKR LAB AP LMP: NORMAL
BKR LAB AP PREVIOUS ABNL DX: NORMAL
BKR LAB AP PREVIOUS ABNORMAL: NORMAL
PATH REPORT.COMMENTS IMP SPEC: NORMAL
PATH REPORT.COMMENTS IMP SPEC: NORMAL
PATH REPORT.RELEVANT HX SPEC: NORMAL